# Patient Record
Sex: MALE | NOT HISPANIC OR LATINO | Employment: OTHER | URBAN - METROPOLITAN AREA
[De-identification: names, ages, dates, MRNs, and addresses within clinical notes are randomized per-mention and may not be internally consistent; named-entity substitution may affect disease eponyms.]

---

## 2021-04-03 ENCOUNTER — IMMUNIZATION (OUTPATIENT)
Dept: VACCINE CLINIC | Facility: HOSPITAL | Age: 66
End: 2021-04-03

## 2021-04-03 PROCEDURE — 91300 HC SARSCOV02 VAC 30MCG/0.3ML IM: CPT | Performed by: INTERNAL MEDICINE

## 2021-04-03 PROCEDURE — 0001A: CPT | Performed by: INTERNAL MEDICINE

## 2021-04-27 ENCOUNTER — APPOINTMENT (OUTPATIENT)
Dept: VACCINE CLINIC | Facility: HOSPITAL | Age: 66
End: 2021-04-27

## 2021-04-27 ENCOUNTER — IMMUNIZATION (OUTPATIENT)
Dept: VACCINE CLINIC | Facility: HOSPITAL | Age: 66
End: 2021-04-27

## 2021-04-27 PROCEDURE — 0002A: CPT | Performed by: INTERNAL MEDICINE

## 2021-04-27 PROCEDURE — 91300 HC SARSCOV02 VAC 30MCG/0.3ML IM: CPT | Performed by: INTERNAL MEDICINE

## 2021-05-06 ENCOUNTER — NURSE TRIAGE (OUTPATIENT)
Dept: CALL CENTER | Facility: HOSPITAL | Age: 66
End: 2021-05-06

## 2021-05-06 NOTE — TELEPHONE ENCOUNTER
Second vaccine 10 days ago Pfizer vaccine on 04/27/2021. Has not felt well since getting vaccine.  Intermittent low grade fever since vaccine. Temperature today 99.4 takes Tylenol. Very fatigued since vaccine. Injection site with redness, pain.  Has a call out to MD for a zoom call. He does not want to go to office or to the Er, does not wany to be exposed to anything. Advice per guideline.  Reason for Disposition  • Immunization needed, questions about    Additional Information  • Negative: [1] Difficulty with breathing or swallowing AND [2] starts within 2 hours after injection  • Negative: Difficult to awaken or acting confused (e.g., disoriented, slurred speech)  • Negative: Unresponsive, passed out, or very weak  • Negative: Sounds like a life-threatening emergency to the triager  • Negative: Fever > 104 F (40 C)  • Negative: [1] Fever > 101 F (38.3 C) AND [2] age > 60  • Negative: [1] Fever > 100.0 F (37.8 C) AND [2] bedridden (e.g., nursing home patient, CVA, chronic illness, recovering from surgery)  • Negative: [1] Fever > 100.0 F (37.8 C) AND [2] diabetes mellitus or weak immune system (e.g., HIV positive, cancer chemo, splenectomy, organ transplant, chronic steroids)  • Negative: [1] Measles vaccine rash (onset day 6-12) AND [2] purple or blood-colored  • Negative: Sounds like a severe, unusual reaction to the triager  • Negative: [1] Redness or red streak around the injection site AND [2] begins > 48 hours after shot AND [3] fever  • Negative: [1] Redness or red streak around the injection site AND [2] begins > 48 hours after shot AND [3] no fever  (Exception: red area < 1 inch or 2.5 cm wide)  • Negative: Fever present > 3 days (72 hours)  • Negative: [1] Over 3 days (72 hours) since shot AND [2] redness, swelling or pain getting worse  • Negative: [1] Smallpox vaccine and [2] eye pain, eye redness, or rash on eyelids  • Negative: [1] Pain, tenderness, or swelling at the injection site AND [2] persists  "> 3 days  • Negative: [1] Measles vaccine rash (onset day 6-12) AND [2] persists > 3 days  • Negative: [1] Deep lump follows (in 2 to 8 weeks) Td or TDaP  shot AND [2] becomes tender to the touch    Answer Assessment - Initial Assessment Questions  1. SYMPTOMS: \"What is the main symptom?\" (e.g., redness, swelling, pain)    denies  2. ONSET: \"When was the vaccine (shot) given?\" \"How much later did the *No Answer* begin?\" (e.g., hours, days ago)   04/2/2021  3. SEVERITY: \"How bad is it?\"      It is an effort to move around very fatigued  4. FEVER: \"Is there a fever?\" If so, ask: \"What is it, how was it measured, and when did it start?\"   99.4  5. IMMUNIZATIONS GIVEN: \"What shots have you recently received?\"  2nd dose Pfizer  6. PAST REACTIONS: \"Have you reacted to immunizations before?\" If so, ask: \"What happened?\"    denies  7. OTHER SYMPTOMS: \"Do you have any other symptoms?\"  Fatigue no energy    Protocols used: IMMUNIZATION REACTIONS-ADULT-AH      "

## 2023-10-09 ENCOUNTER — APPOINTMENT (OUTPATIENT)
Dept: CT IMAGING | Facility: HOSPITAL | Age: 68
End: 2023-10-09
Payer: MEDICARE

## 2023-10-09 ENCOUNTER — HOSPITAL ENCOUNTER (EMERGENCY)
Facility: HOSPITAL | Age: 68
Discharge: HOME | End: 2023-10-10
Attending: EMERGENCY MEDICINE | Admitting: EMERGENCY MEDICINE
Payer: MEDICARE

## 2023-10-09 DIAGNOSIS — S09.90XA INJURY OF HEAD, INITIAL ENCOUNTER: ICD-10-CM

## 2023-10-09 DIAGNOSIS — B02.29 POSTHERPETIC NEURALGIA: Primary | ICD-10-CM

## 2023-10-09 PROCEDURE — 70450 CT HEAD/BRAIN W/O DYE: CPT

## 2023-10-09 PROCEDURE — 72125 CT NECK SPINE W/O DYE: CPT

## 2023-10-09 PROCEDURE — 99284 EMERGENCY DEPT VISIT MOD MDM: CPT

## 2023-10-09 RX ORDER — HYDROCODONE BITARTRATE AND ACETAMINOPHEN 5; 325 MG/1; MG/1
1 TABLET ORAL EVERY 6 HOURS PRN
Status: DISCONTINUED | OUTPATIENT
Start: 2023-10-09 | End: 2023-10-10 | Stop reason: HOSPADM

## 2023-10-09 RX ADMIN — HYDROCODONE BITARTRATE AND ACETAMINOPHEN 1 TABLET: 5; 325 TABLET ORAL at 23:25

## 2023-10-10 VITALS
OXYGEN SATURATION: 98 % | DIASTOLIC BLOOD PRESSURE: 90 MMHG | HEIGHT: 74 IN | BODY MASS INDEX: 22.46 KG/M2 | HEART RATE: 66 BPM | SYSTOLIC BLOOD PRESSURE: 158 MMHG | WEIGHT: 175 LBS | TEMPERATURE: 97 F | RESPIRATION RATE: 18 BRPM

## 2023-10-10 RX ORDER — IBUPROFEN 400 MG/1
400 TABLET ORAL ONCE
Status: COMPLETED | OUTPATIENT
Start: 2023-10-10 | End: 2023-10-10

## 2023-10-10 RX ORDER — HYDROCODONE BITARTRATE AND ACETAMINOPHEN 5; 325 MG/1; MG/1
1 TABLET ORAL EVERY 12 HOURS PRN
Qty: 8 TABLET | Refills: 0 | Status: SHIPPED | OUTPATIENT
Start: 2023-10-10

## 2023-10-10 RX ADMIN — IBUPROFEN 400 MG: 400 TABLET, FILM COATED ORAL at 01:28

## 2023-10-10 NOTE — DISCHARGE INSTRUCTIONS
As we discussed, follow-up with your primary care physician later this week.  Take Tylenol and Motrin for pain.  Use hydrocodone for breakthrough pain only.  Return if worsening of symptoms or any other concerns.

## 2023-10-10 NOTE — ED PROVIDER NOTES
EMERGENCY DEPARTMENT ENCOUNTER    Room Number:  19/19  Date of encounter:  10/10/2023  PCP: Provider, No Known  Historian: Patient  Relevant information and history provided by sources other than the patient will be included below and in the ED Course.  Review of pertinent past medical records may also be included in record below and ED Course.    HPI:  Chief Complaint: Persistent pain from shingles, fall and hit head  A complete HPI/ROS/PMH/PSH/SH/FH are unobtainable due to: Not applicable  Context: Beth Mckinney is a 68 y.o. male who presents to the ED c/o this patient had a fall last night.  Patient's lights were off in the den.  He went and walked to the next room to get some snack and something to eat.  He walked back into the den and he tripped over his piece of furniture and then landed on the floor hitting his left forehead..  He has had persistent pain to the left forehead.  But he has had pain since he had shingles in the first week of September.  He has been taking Tylenol and Motrin.  He has tried Lyrica without success.  He was on hydrocodone and has been off hydrocodone for about 10 days.  The pain has persisted.  He believes the pain is likely from the shingles rather than from the fall.  He denied loss of consciousness.  Denies any neurologic deficit.  Denies any visual change, weakness to arms or legs or sensory change.  He did not hurt himself anywhere else.  Denies any neck pain.        Previous Episodes: Persistent postherpetic neuralgia from shingles to his left forehead  Current Symptoms: See above    MEDICAL HISTORY REVIEWED  In looking at old records I see this patient was recently seen up [facility] 2023 had postherpetic neuralgia.  Patient has a history of diabetes and arthritis.  I reviewed the patient's medicine list.      PAST MEDICAL HISTORY  Active Ambulatory Problems     Diagnosis Date Noted    No Active Ambulatory Problems     Resolved Ambulatory Problems     Diagnosis  Date Noted    No Resolved Ambulatory Problems     Past Medical History:   Diagnosis Date    Arthritis     Colon cancer     Diabetes mellitus          PAST SURGICAL HISTORY  Past Surgical History:   Procedure Laterality Date    FEMUR SURGERY      JOINT REPLACEMENT           FAMILY HISTORY  Family History   Problem Relation Age of Onset    No Known Problems Mother     Diabetes Father          SOCIAL HISTORY  Social History     Socioeconomic History    Marital status: Single   Tobacco Use    Smoking status: Never   Substance and Sexual Activity    Alcohol use: Yes     Comment: SELDOM         ALLERGIES  Tree nut and Penicillins        REVIEW OF SYSTEMS  Review of Systems     All systems reviewed and negative except for those discussed in HPI.       PHYSICAL EXAM    I have reviewed the triage vital signs and nursing notes.    ED Triage Vitals   Temp Heart Rate Resp BP SpO2   10/09/23 2302 10/09/23 2302 10/09/23 2302 10/09/23 2309 10/09/23 2302   97 øF (36.1 øC) 97 18 (!) 163/109 96 %      Temp src Heart Rate Source Patient Position BP Location FiO2 (%)   -- -- 10/09/23 2309 -- --     Lying         GENERAL: Elderly male.  No acute cardiovascular or respiratory distress.Vital signs on my initial evaluation have been reviewed and unremarkable  HENT: nares patent  Head he has a small abrasion to his left forehead.  He has significant scarring that you can see where he had shingles located to his left forehead left periorbital region and to his left nasal septum.  There is no acute vascular lesions.  There is no drainage.  He has no active bleeding../neck/ face are symmetric without gross deformity, signs of trauma, or swelling  EYES: no scleral icterus, no conjunctival pallor.  Conjunctive a looks unremarkable.  No visual impairment.  Pupils equal round reactive to light extract muscles are intact.  NECK: Supple, no meningismus.  Full range of motion normal inspection and nontender with palpation  CV: regular rhythm, regular  rate with intact distal pulses.  RESPIRATORY: normal effort and no respiratory distress.  ABDOMEN: soft and nontender  MUSCULOSKELETAL: no deformity.  Full range of motion to active and passive range of motion to all extremities.  No pain with movement.  Intact distal pulses  NEURO: alert and appropriate, moves all extremities, follows commands.  No focal motor or sensory changes  SKIN: warm, dry    Vital signs and nursing notes reviewed.        LAB RESULTS  No results found for this or any previous visit (from the past 24 hour(s)).    Ordered the above labs and independently reviewed the results.        RADIOLOGY  CT Cervical Spine Without Contrast    Result Date: 10/10/2023  Patient: FANNIE KIMBALL  Time Out: 00:23 Exam(s): CT C SPINE EXAM:   CT Cervical Spine Without Intravenous Contrast CLINICAL HISTORY:   Fall and injury to head. TECHNIQUE:   Axial computed tomography images of the cervical spine without intravenous contrast.  CTDI is 16.85 mGy and DLP is 427.9 mGy-cm.  This CT exam was performed according to the principle of ALARA (As Low As Reasonably Achievable) by using one or more of the following dose reduction techniques: automated exposure control, adjustment of the mA and or kV according to patient size, and or use of iterative reconstruction technique. COMPARISON:   No relevant prior studies available. FINDINGS:   Vertebrae:  The vertebral bodies are intact without acute osseous traumatic injury.  No anterolisthesis or retrolisthesis is identified.  The facet joints are well aligned without subluxation or dislocation.  The pedicles, transverse processes and spinous processes are intact.   Discs spinal canal neural foramina:  No acute findings.  Chronic disc space narrowing and marginal hypertrophic changes noted at several levels.   No osseous spinal canal stenosis.   Soft tissues:  Unremarkable.   Vasculature:  Incidental variant right subclavian artery noted.   Lung apices:  The lung apices  demonstrate no evidence for significant acute traumatic injury. IMPRESSION:       No acute osseous traumatic injury or significant abnormal alignment involving the cervical spine.     Electronically signed by Darryl Colvin MD on 10-10-23 at 0023    CT Head Without Contrast    Result Date: 10/10/2023  Patient: FANNIE KIMBALL  Time Out: 00:19 Exam(s): CT HEAD Without Contrast EXAM:   CT Head Without Intravenous Contrast CLINICAL HISTORY:   Fall and hit left forehead.. TECHNIQUE:   Axial computed tomography images of the head brain without intravenous contrast.  This CT exam was performed according to the principle of ALARA (As Low As Reasonably Achievable) by using one or more of the following dose reduction techniques: automated exposure control, adjustment of the mA and or kV according to patient size, and or use of iterative reconstruction technique. COMPARISON:   No relevant prior studies available. FINDINGS:   Brain:  No intracranial hemorrhage.  No significant mass effect.  No evidence for cortical infarct.  Mild periventricular deep white matter hypodense changes noted.   Ventricles:  Unremarkable.  No ventriculomegaly.   Bones joints:  Unremarkable.  No acute fracture.   Soft tissues:  Mild soft tissue fullness in the inferior left supraorbital region.  No other significant soft tissue abnormality identified.   Sinuses:  Mucosal thickening involving the posterior right maxillary sinusitis.  The paranasal sinuses are otherwise well-aerated.   Mastoid air cells:  Unremarkable as visualized.  No mastoid effusion. IMPRESSION:       No acute intracranial process identified.  Mild soft tissue fullness overlying the inferior left frontal region.  No skull fracture.     Electronically signed by Darryl Colvin MD on 10-10-23 at 0019     I ordered the above noted radiological studies. Reviewed by me and discussed with radiologist.  See dictation for official radiology  interpretation.      PROCEDURES    Procedures      MEDICATIONS GIVEN IN ER    Medications   HYDROcodone-acetaminophen (NORCO) 5-325 MG per tablet 1 tablet (1 tablet Oral Given 10/9/23 5685)   ibuprofen (ADVIL,MOTRIN) tablet 400 mg (400 mg Oral Given 10/10/23 0128)         All labs have been independently reviewed by me.  All radiology studies have been reviewed by me and I discussed with radiologist dictating the report when indicated below.  All EKG's independently viewed and interpreted by me.  Discussion below represents my analysis of pertinent findings related to patient's condition, differential diagnosis, treatment plan and final disposition.        PROGRESS, DATA ANALYSIS, CONSULTS, AND MEDICAL DECISION MAKING    We will check a CT scan of his head and neck.  I anticipate this pain is likely from the postherpetic neuralgia rather than from head injury.  He is not on any blood thinning medicine.  He is requesting medicine for pain.  He has been taking Motrin currently without success.  He states that hydrocodone low-dose was very effective at help alleviating the pain.  He has not been on hydrocodone for 10 days.  He tried Lyrica and he had side effects with nausea and vomiting and did not find it was effective.  I will give him a low-dose of hydrocodone here.  Discussed with him at length about the side effects and addictive potential and he understands.  He still would like hydrocodone.      ED Course as of 10/10/23 0151   Tue Oct 10, 2023   0111 I reviewed the CT scan of the head report from radiologist.  No acute intracranial process identified.  Please see complete dictated report from radiologist. [MM]   0111 I reviewed the CT scan report of the cervical spine from radiologist.  No acute fracture or signs of traumatic injury no dislocation.  Please see complete dictated report from radiologist [MM]   0115 On reevaluation of the patient he is resting comfortably.  He has had improvement of his pain with  the hydrocodone.  He is requesting hydrocodone to go home with.  I will give him 5\325.  Instructed him to use it for breakthrough pain only and only take it as prescribed.  He has tolerated hydrocodone in the past.  All questions answered [MM]      ED Course User Index  [MM] Nirav Jeffrey MD       AS OF 01:51 EDT VITALS:    BP - 158/90  HR - 66  TEMP - 97 øF (36.1 øC)  02 SATS - 98%    SOCIAL DETERMINANTS OF HEALTH THAT IMPACT OR LIMIT CARE (For example..Homelessness,safe discharge, inability to obtain care, follow up, or prescriptions):      DIAGNOSIS  Final diagnoses:   Postherpetic neuralgia   Injury of head, initial encounter         DISPOSITION  DISCHARGE    Patient discharged in stable condition.    Reviewed implications of results, diagnosis, meds, responsibility to follow up, warning signs and symptoms of possible worsening, potential complications and reasons to return to ER, including worsening of symptoms, any worsening of symptoms, fever, any new weakness to arms or legs, worsening of pain, any concerns..    Patient/Family voiced understanding of above instructions.    Discussed plan for discharge, as there is no emergent indication for admission. Pt/family is agreeable and understands need for follow up and repeat testing.  Pt is aware that discharge does not mean that nothing is wrong but it indicates no emergency is present that requires admission and they must continue care with follow-up as given below or physician of their choice.     FOLLOW-UP  No follow-up provider specified.       Medication List        New Prescriptions      HYDROcodone-acetaminophen 5-325 MG per tablet  Commonly known as: NORCO  Take 1 tablet by mouth Every 12 (Twelve) Hours As Needed for Moderate Pain for up to 8 doses.               Where to Get Your Medications        You can get these medications from any pharmacy    Bring a paper prescription for each of these medications  HYDROcodone-acetaminophen 5-325 MG per  tablet                 DICTATED UTILIZING DRAGON DICTATION    Note Disclaimer: At [facility], we believe that sharing information builds trust and better relationships. You are receiving this note because you recently visited [facility]. It is possible you will see health information before a provider has talked with you about it. This kind of information can be easy to misunderstand. To help you fully understand what it means for your health, we urge you to discuss this note with your provider.       Nirav Jeffrey MD  10/10/23 0151

## 2023-10-19 ENCOUNTER — APPOINTMENT (OUTPATIENT)
Dept: GENERAL RADIOLOGY | Facility: HOSPITAL | Age: 68
DRG: 369 | End: 2023-10-19
Payer: MEDICARE

## 2023-10-19 ENCOUNTER — APPOINTMENT (OUTPATIENT)
Dept: CT IMAGING | Facility: HOSPITAL | Age: 68
DRG: 369 | End: 2023-10-19
Payer: MEDICARE

## 2023-10-19 ENCOUNTER — HOSPITAL ENCOUNTER (INPATIENT)
Facility: HOSPITAL | Age: 68
LOS: 3 days | Discharge: HOME | DRG: 369 | End: 2023-10-22
Attending: EMERGENCY MEDICINE | Admitting: INTERNAL MEDICINE
Payer: MEDICARE

## 2023-10-19 DIAGNOSIS — R01.1 SYSTOLIC MURMUR: ICD-10-CM

## 2023-10-19 DIAGNOSIS — I95.89 HYPOTENSION DUE TO HYPOVOLEMIA: ICD-10-CM

## 2023-10-19 DIAGNOSIS — B20 HIV INFECTION, UNSPECIFIED SYMPTOM STATUS: ICD-10-CM

## 2023-10-19 DIAGNOSIS — K92.2 ACUTE UPPER GI BLEED: Primary | ICD-10-CM

## 2023-10-19 DIAGNOSIS — E86.1 HYPOTENSION DUE TO HYPOVOLEMIA: ICD-10-CM

## 2023-10-19 DIAGNOSIS — N39.0 ACUTE UTI: ICD-10-CM

## 2023-10-19 LAB
ABO GROUP BLD: NORMAL
ALBUMIN SERPL-MCNC: 2.6 G/DL (ref 3.5–5.2)
ALBUMIN/GLOB SERPL: 1 G/DL
ALP SERPL-CCNC: 31 U/L (ref 39–117)
ALT SERPL W P-5'-P-CCNC: 12 U/L (ref 1–41)
ANION GAP SERPL CALCULATED.3IONS-SCNC: 6 MMOL/L (ref 5–15)
AST SERPL-CCNC: 13 U/L (ref 1–40)
BACTERIA UR QL AUTO: ABNORMAL /HPF
BASOPHILS # BLD AUTO: 0.05 10*3/MM3 (ref 0–0.2)
BASOPHILS NFR BLD AUTO: 0.4 % (ref 0–1.5)
BILIRUB SERPL-MCNC: 0.2 MG/DL (ref 0–1.2)
BILIRUB UR QL STRIP: NEGATIVE
BLD GP AB SCN SERPL QL: NEGATIVE
BUN SERPL-MCNC: 49 MG/DL (ref 8–23)
BUN/CREAT SERPL: 50.5 (ref 7–25)
CALCIUM SPEC-SCNC: 8.9 MG/DL (ref 8.6–10.5)
CHLORIDE SERPL-SCNC: 111 MMOL/L (ref 98–107)
CLARITY UR: CLEAR
CO2 SERPL-SCNC: 25 MMOL/L (ref 22–29)
COLOR UR: YELLOW
CREAT SERPL-MCNC: 0.97 MG/DL (ref 0.76–1.27)
D DIMER PPP FEU-MCNC: 0.48 MCGFEU/ML (ref 0–0.66)
D-LACTATE SERPL-SCNC: 2.4 MMOL/L (ref 0.5–2)
DEPRECATED RDW RBC AUTO: 48 FL (ref 37–54)
EGFRCR SERPLBLD CKD-EPI 2021: 86.1 ML/MIN/1.73
EOSINOPHIL # BLD AUTO: 0.15 10*3/MM3 (ref 0–0.4)
EOSINOPHIL NFR BLD AUTO: 1.3 % (ref 0.3–6.2)
ERYTHROCYTE [DISTWIDTH] IN BLOOD BY AUTOMATED COUNT: 15.4 % (ref 12.3–15.4)
FERRITIN SERPL-MCNC: 47.5 NG/ML (ref 30–400)
FOLATE SERPL-MCNC: 4.3 NG/ML (ref 4.78–24.2)
GLOBULIN UR ELPH-MCNC: 2.7 GM/DL
GLUCOSE SERPL-MCNC: 132 MG/DL (ref 65–99)
GLUCOSE UR STRIP-MCNC: NEGATIVE MG/DL
HAPTOGLOB SERPL-MCNC: 105 MG/DL (ref 30–200)
HCT VFR BLD AUTO: 18.3 % (ref 37.5–51)
HGB BLD-MCNC: 5.6 G/DL (ref 13–17.7)
HGB UR QL STRIP.AUTO: NEGATIVE
HYALINE CASTS UR QL AUTO: ABNORMAL /LPF
IMM GRANULOCYTES # BLD AUTO: 0.1 10*3/MM3 (ref 0–0.05)
IMM GRANULOCYTES NFR BLD AUTO: 0.9 % (ref 0–0.5)
IRON 24H UR-MRATE: 44 MCG/DL (ref 59–158)
IRON SATN MFR SERPL: 17 % (ref 20–50)
KETONES UR QL STRIP: NEGATIVE
LDH SERPL-CCNC: 125 U/L (ref 135–225)
LEUKOCYTE ESTERASE UR QL STRIP.AUTO: ABNORMAL
LYMPHOCYTES # BLD AUTO: 2.8 10*3/MM3 (ref 0.7–3.1)
LYMPHOCYTES NFR BLD AUTO: 23.9 % (ref 19.6–45.3)
MCH RBC QN AUTO: 27.2 PG (ref 26.6–33)
MCHC RBC AUTO-ENTMCNC: 30.6 G/DL (ref 31.5–35.7)
MCV RBC AUTO: 88.8 FL (ref 79–97)
MONOCYTES # BLD AUTO: 0.89 10*3/MM3 (ref 0.1–0.9)
MONOCYTES NFR BLD AUTO: 7.6 % (ref 5–12)
NEUTROPHILS NFR BLD AUTO: 65.9 % (ref 42.7–76)
NEUTROPHILS NFR BLD AUTO: 7.74 10*3/MM3 (ref 1.7–7)
NITRITE UR QL STRIP: NEGATIVE
NRBC BLD AUTO-RTO: 0.3 /100 WBC (ref 0–0.2)
NT-PROBNP SERPL-MCNC: <36 PG/ML (ref 0–900)
PH UR STRIP.AUTO: 5.5 [PH] (ref 5–8)
PLATELET # BLD AUTO: 196 10*3/MM3 (ref 140–450)
PMV BLD AUTO: 11 FL (ref 6–12)
POTASSIUM SERPL-SCNC: 3.9 MMOL/L (ref 3.5–5.2)
PROT SERPL-MCNC: 5.3 G/DL (ref 6–8.5)
PROT UR QL STRIP: NEGATIVE
RBC # BLD AUTO: 2.06 10*6/MM3 (ref 4.14–5.8)
RBC # UR STRIP: ABNORMAL /HPF
REF LAB TEST METHOD: ABNORMAL
RETICS # AUTO: 0.06 10*6/MM3 (ref 0.02–0.13)
RETICS/RBC NFR AUTO: 3.19 % (ref 0.7–1.9)
RH BLD: POSITIVE
SODIUM SERPL-SCNC: 142 MMOL/L (ref 136–145)
SP GR UR STRIP: 1.02 (ref 1–1.03)
SQUAMOUS #/AREA URNS HPF: ABNORMAL /HPF
T&S EXPIRATION DATE: NORMAL
TIBC SERPL-MCNC: 262 MCG/DL (ref 298–536)
TRANSFERRIN SERPL-MCNC: 176 MG/DL (ref 200–360)
TROPONIN T SERPL HS-MCNC: 28 NG/L
UROBILINOGEN UR QL STRIP: ABNORMAL
VIT B12 BLD-MCNC: 631 PG/ML (ref 211–946)
WBC # UR STRIP: ABNORMAL /HPF
WBC NRBC COR # BLD: 11.73 10*3/MM3 (ref 3.4–10.8)

## 2023-10-19 PROCEDURE — 83010 ASSAY OF HAPTOGLOBIN QUANT: CPT | Performed by: EMERGENCY MEDICINE

## 2023-10-19 PROCEDURE — 86850 RBC ANTIBODY SCREEN: CPT | Performed by: EMERGENCY MEDICINE

## 2023-10-19 PROCEDURE — 82728 ASSAY OF FERRITIN: CPT | Performed by: EMERGENCY MEDICINE

## 2023-10-19 PROCEDURE — 85045 AUTOMATED RETICULOCYTE COUNT: CPT | Performed by: EMERGENCY MEDICINE

## 2023-10-19 PROCEDURE — 85025 COMPLETE CBC W/AUTO DIFF WBC: CPT | Performed by: EMERGENCY MEDICINE

## 2023-10-19 PROCEDURE — 84484 ASSAY OF TROPONIN QUANT: CPT | Performed by: EMERGENCY MEDICINE

## 2023-10-19 PROCEDURE — 71045 X-RAY EXAM CHEST 1 VIEW: CPT

## 2023-10-19 PROCEDURE — 86900 BLOOD TYPING SEROLOGIC ABO: CPT

## 2023-10-19 PROCEDURE — 82607 VITAMIN B-12: CPT | Performed by: EMERGENCY MEDICINE

## 2023-10-19 PROCEDURE — 84466 ASSAY OF TRANSFERRIN: CPT | Performed by: EMERGENCY MEDICINE

## 2023-10-19 PROCEDURE — 83540 ASSAY OF IRON: CPT | Performed by: EMERGENCY MEDICINE

## 2023-10-19 PROCEDURE — 86923 COMPATIBILITY TEST ELECTRIC: CPT

## 2023-10-19 PROCEDURE — 87040 BLOOD CULTURE FOR BACTERIA: CPT | Performed by: EMERGENCY MEDICINE

## 2023-10-19 PROCEDURE — 82948 REAGENT STRIP/BLOOD GLUCOSE: CPT

## 2023-10-19 PROCEDURE — 83880 ASSAY OF NATRIURETIC PEPTIDE: CPT | Performed by: EMERGENCY MEDICINE

## 2023-10-19 PROCEDURE — 25810000003 SODIUM CHLORIDE 0.9 % SOLUTION: Performed by: EMERGENCY MEDICINE

## 2023-10-19 PROCEDURE — 87086 URINE CULTURE/COLONY COUNT: CPT | Performed by: EMERGENCY MEDICINE

## 2023-10-19 PROCEDURE — 86900 BLOOD TYPING SEROLOGIC ABO: CPT | Performed by: EMERGENCY MEDICINE

## 2023-10-19 PROCEDURE — 82746 ASSAY OF FOLIC ACID SERUM: CPT | Performed by: EMERGENCY MEDICINE

## 2023-10-19 PROCEDURE — 86901 BLOOD TYPING SEROLOGIC RH(D): CPT | Performed by: EMERGENCY MEDICINE

## 2023-10-19 PROCEDURE — 80053 COMPREHEN METABOLIC PANEL: CPT | Performed by: EMERGENCY MEDICINE

## 2023-10-19 PROCEDURE — 25510000001 IOPAMIDOL 61 % SOLUTION: Performed by: INTERNAL MEDICINE

## 2023-10-19 PROCEDURE — 83615 LACTATE (LD) (LDH) ENZYME: CPT | Performed by: EMERGENCY MEDICINE

## 2023-10-19 PROCEDURE — 85379 FIBRIN DEGRADATION QUANT: CPT | Performed by: EMERGENCY MEDICINE

## 2023-10-19 PROCEDURE — 93010 ELECTROCARDIOGRAM REPORT: CPT | Performed by: INTERNAL MEDICINE

## 2023-10-19 PROCEDURE — 36415 COLL VENOUS BLD VENIPUNCTURE: CPT

## 2023-10-19 PROCEDURE — 83605 ASSAY OF LACTIC ACID: CPT | Performed by: EMERGENCY MEDICINE

## 2023-10-19 PROCEDURE — 25010000002 KETOROLAC TROMETHAMINE PER 15 MG: Performed by: EMERGENCY MEDICINE

## 2023-10-19 PROCEDURE — 74177 CT ABD & PELVIS W/CONTRAST: CPT

## 2023-10-19 PROCEDURE — 93005 ELECTROCARDIOGRAM TRACING: CPT | Performed by: EMERGENCY MEDICINE

## 2023-10-19 PROCEDURE — 25010000002 CEFTRIAXONE PER 250 MG: Performed by: EMERGENCY MEDICINE

## 2023-10-19 PROCEDURE — 86880 COOMBS TEST DIRECT: CPT | Performed by: EMERGENCY MEDICINE

## 2023-10-19 PROCEDURE — 81001 URINALYSIS AUTO W/SCOPE: CPT | Performed by: EMERGENCY MEDICINE

## 2023-10-19 PROCEDURE — 99285 EMERGENCY DEPT VISIT HI MDM: CPT

## 2023-10-19 PROCEDURE — 86901 BLOOD TYPING SEROLOGIC RH(D): CPT

## 2023-10-19 RX ORDER — PANTOPRAZOLE SODIUM 40 MG/10ML
80 INJECTION, POWDER, LYOPHILIZED, FOR SOLUTION INTRAVENOUS ONCE
Status: COMPLETED | OUTPATIENT
Start: 2023-10-19 | End: 2023-10-19

## 2023-10-19 RX ORDER — KETOROLAC TROMETHAMINE 15 MG/ML
15 INJECTION, SOLUTION INTRAMUSCULAR; INTRAVENOUS ONCE
Status: COMPLETED | OUTPATIENT
Start: 2023-10-19 | End: 2023-10-19

## 2023-10-19 RX ORDER — SODIUM CHLORIDE 0.9 % (FLUSH) 0.9 %
10 SYRINGE (ML) INJECTION AS NEEDED
Status: DISCONTINUED | OUTPATIENT
Start: 2023-10-19 | End: 2023-10-22 | Stop reason: HOSPADM

## 2023-10-19 RX ADMIN — IOPAMIDOL 85 ML: 612 INJECTION, SOLUTION INTRAVENOUS at 23:11

## 2023-10-19 RX ADMIN — SODIUM CHLORIDE 1000 ML: 9 INJECTION, SOLUTION INTRAVENOUS at 22:06

## 2023-10-19 RX ADMIN — KETOROLAC TROMETHAMINE 15 MG: 15 INJECTION, SOLUTION INTRAMUSCULAR; INTRAVENOUS at 22:06

## 2023-10-19 RX ADMIN — CEFTRIAXONE 2000 MG: 2 INJECTION, POWDER, FOR SOLUTION INTRAMUSCULAR; INTRAVENOUS at 22:06

## 2023-10-19 RX ADMIN — PANTOPRAZOLE SODIUM 8 MG/HR: 40 INJECTION, POWDER, FOR SOLUTION INTRAVENOUS at 23:25

## 2023-10-19 RX ADMIN — PANTOPRAZOLE SODIUM 80 MG: 40 INJECTION, POWDER, FOR SOLUTION INTRAVENOUS at 22:31

## 2023-10-20 ENCOUNTER — APPOINTMENT (OUTPATIENT)
Dept: CARDIOLOGY | Facility: HOSPITAL | Age: 68
DRG: 369 | End: 2023-10-20
Payer: MEDICARE

## 2023-10-20 ENCOUNTER — ANESTHESIA (OUTPATIENT)
Dept: GASTROENTEROLOGY | Facility: HOSPITAL | Age: 68
DRG: 369 | End: 2023-10-20
Payer: MEDICARE

## 2023-10-20 ENCOUNTER — ANESTHESIA EVENT (OUTPATIENT)
Dept: GASTROENTEROLOGY | Facility: HOSPITAL | Age: 68
DRG: 369 | End: 2023-10-20
Payer: MEDICARE

## 2023-10-20 VITALS — SYSTOLIC BLOOD PRESSURE: 114 MMHG | DIASTOLIC BLOOD PRESSURE: 49 MMHG | OXYGEN SATURATION: 100 % | HEART RATE: 95 BPM

## 2023-10-20 LAB
AORTIC DIMENSIONLESS INDEX: 1.1 (DI)
ASCENDING AORTA: 2.7 CM
BASOPHILS # BLD AUTO: 0.06 10*3/MM3 (ref 0–0.2)
BASOPHILS NFR BLD AUTO: 0.5 % (ref 0–1.5)
BH BB BLOOD EXPIRATION DATE: NORMAL
BH BB BLOOD EXPIRATION DATE: NORMAL
BH BB BLOOD TYPE BARCODE: 6200
BH BB BLOOD TYPE BARCODE: 6200
BH BB DISPENSE STATUS: NORMAL
BH BB DISPENSE STATUS: NORMAL
BH BB PRODUCT CODE: NORMAL
BH BB PRODUCT CODE: NORMAL
BH BB UNIT NUMBER: NORMAL
BH BB UNIT NUMBER: NORMAL
BH CV ECHO LEFT VENTRICLE BASAL CAVITARY GRADIENT: 35 MMHG
BH CV ECHO MEAS - ACS: 1.57 CM
BH CV ECHO MEAS - AO MAX PG: 18 MMHG
BH CV ECHO MEAS - AO MEAN PG: 8.8 MMHG
BH CV ECHO MEAS - AO ROOT DIAM: 3.7 CM
BH CV ECHO MEAS - AO V2 MAX: 211 CM/SEC
BH CV ECHO MEAS - AO V2 VTI: 33.2 CM
BH CV ECHO MEAS - AVA(I,D): 3.9 CM2
BH CV ECHO MEAS - EDV(CUBED): 31.2 ML
BH CV ECHO MEAS - EDV(MOD-SP2): 88 ML
BH CV ECHO MEAS - EDV(MOD-SP4): 97 ML
BH CV ECHO MEAS - EF(MOD-BP): 62.6 %
BH CV ECHO MEAS - EF(MOD-SP2): 63.6 %
BH CV ECHO MEAS - EF(MOD-SP4): 62.9 %
BH CV ECHO MEAS - ESV(CUBED): 11 ML
BH CV ECHO MEAS - ESV(MOD-SP2): 32 ML
BH CV ECHO MEAS - ESV(MOD-SP4): 36 ML
BH CV ECHO MEAS - FS: 29.4 %
BH CV ECHO MEAS - IVS/LVPW: 1.09 CM
BH CV ECHO MEAS - IVSD: 1.51 CM
BH CV ECHO MEAS - LAT PEAK E' VEL: 15.2 CM/SEC
BH CV ECHO MEAS - LV MASS(C)D: 159.1 GRAMS
BH CV ECHO MEAS - LV MAX PG: 14.5 MMHG
BH CV ECHO MEAS - LV MEAN PG: 7.6 MMHG
BH CV ECHO MEAS - LV V1 MAX: 190.4 CM/SEC
BH CV ECHO MEAS - LV V1 VTI: 34.8 CM
BH CV ECHO MEAS - LVIDD: 3.1 CM
BH CV ECHO MEAS - LVIDS: 2.22 CM
BH CV ECHO MEAS - LVOT AREA: 3.7 CM2
BH CV ECHO MEAS - LVOT DIAM: 2.18 CM
BH CV ECHO MEAS - LVPWD: 1.39 CM
BH CV ECHO MEAS - MED PEAK E' VEL: 12.6 CM/SEC
BH CV ECHO MEAS - MR MAX PG: 23.6 MMHG
BH CV ECHO MEAS - MR MAX VEL: 243 CM/SEC
BH CV ECHO MEAS - MV A DUR: 0.14 SEC
BH CV ECHO MEAS - MV A MAX VEL: 99.5 CM/SEC
BH CV ECHO MEAS - MV DEC SLOPE: 506.2 CM/SEC2
BH CV ECHO MEAS - MV DEC TIME: 0.13 SEC
BH CV ECHO MEAS - MV E MAX VEL: 62.9 CM/SEC
BH CV ECHO MEAS - MV E/A: 0.63
BH CV ECHO MEAS - MV MAX PG: 6.8 MMHG
BH CV ECHO MEAS - MV MEAN PG: 2.5 MMHG
BH CV ECHO MEAS - MV P1/2T: 50.3 MSEC
BH CV ECHO MEAS - MV V2 VTI: 20.6 CM
BH CV ECHO MEAS - MVA(P1/2T): 4.4 CM2
BH CV ECHO MEAS - MVA(VTI): 6.3 CM2
BH CV ECHO MEAS - PA ACC TIME: 0.09 SEC
BH CV ECHO MEAS - PA V2 MAX: 129 CM/SEC
BH CV ECHO MEAS - PULM DIAS VEL: 31.4 CM/SEC
BH CV ECHO MEAS - PULM S/D: 1.35
BH CV ECHO MEAS - PULM SYS VEL: 42.4 CM/SEC
BH CV ECHO MEAS - QP/QS: 0.29
BH CV ECHO MEAS - RAP SYSTOLE: 3 MMHG
BH CV ECHO MEAS - RV MAX PG: 5.8 MMHG
BH CV ECHO MEAS - RV V1 MAX: 120.3 CM/SEC
BH CV ECHO MEAS - RV V1 VTI: 22.9 CM
BH CV ECHO MEAS - RVOT DIAM: 1.45 CM
BH CV ECHO MEAS - RVSP: 25 MMHG
BH CV ECHO MEAS - SV(LVOT): 129.4 ML
BH CV ECHO MEAS - SV(MOD-SP2): 56 ML
BH CV ECHO MEAS - SV(MOD-SP4): 61 ML
BH CV ECHO MEAS - SV(RVOT): 38 ML
BH CV ECHO MEAS - TR MAX PG: 21.8 MMHG
BH CV ECHO MEAS - TR MAX VEL: 233.5 CM/SEC
BH CV ECHO MEASUREMENTS AVERAGE E/E' RATIO: 4.53
BH CV XLRA - RV BASE: 2.6 CM
BH CV XLRA - RV LENGTH: 7.3 CM
BH CV XLRA - RV MID: 2.8 CM
BH CV XLRA - TDI S': 23.4 CM/SEC
CROSSMATCH INTERPRETATION: NORMAL
CROSSMATCH INTERPRETATION: NORMAL
D-LACTATE SERPL-SCNC: 1.1 MMOL/L (ref 0.5–2)
D-LACTATE SERPL-SCNC: 2.6 MMOL/L (ref 0.5–2)
DAT POLY-SP REAG RBC QL: NEGATIVE
DEPRECATED RDW RBC AUTO: 46.9 FL (ref 37–54)
EOSINOPHIL # BLD AUTO: 0.08 10*3/MM3 (ref 0–0.4)
EOSINOPHIL NFR BLD AUTO: 0.7 % (ref 0.3–6.2)
ERYTHROCYTE [DISTWIDTH] IN BLOOD BY AUTOMATED COUNT: 14.7 % (ref 12.3–15.4)
GEN 5 2HR TROPONIN T REFLEX: 25 NG/L
GLUCOSE BLDC GLUCOMTR-MCNC: 118 MG/DL (ref 70–130)
GLUCOSE BLDC GLUCOMTR-MCNC: 143 MG/DL (ref 70–130)
GLUCOSE BLDC GLUCOMTR-MCNC: 184 MG/DL (ref 70–130)
GLUCOSE BLDC GLUCOMTR-MCNC: 98 MG/DL (ref 70–130)
HCT VFR BLD AUTO: 21.3 % (ref 37.5–51)
HGB BLD-MCNC: 7 G/DL (ref 13–17.7)
IMM GRANULOCYTES # BLD AUTO: 0.1 10*3/MM3 (ref 0–0.05)
IMM GRANULOCYTES NFR BLD AUTO: 0.9 % (ref 0–0.5)
LEFT ATRIUM VOLUME INDEX: 30.4 ML/M2
LYMPHOCYTES # BLD AUTO: 1.45 10*3/MM3 (ref 0.7–3.1)
LYMPHOCYTES NFR BLD AUTO: 12.6 % (ref 19.6–45.3)
MAGNESIUM SERPL-MCNC: 1.7 MG/DL (ref 1.6–2.4)
MCH RBC QN AUTO: 29.4 PG (ref 26.6–33)
MCHC RBC AUTO-ENTMCNC: 32.9 G/DL (ref 31.5–35.7)
MCV RBC AUTO: 89.5 FL (ref 79–97)
MONOCYTES # BLD AUTO: 0.43 10*3/MM3 (ref 0.1–0.9)
MONOCYTES NFR BLD AUTO: 3.7 % (ref 5–12)
NEUTROPHILS NFR BLD AUTO: 81.6 % (ref 42.7–76)
NEUTROPHILS NFR BLD AUTO: 9.37 10*3/MM3 (ref 1.7–7)
NRBC BLD AUTO-RTO: 0.3 /100 WBC (ref 0–0.2)
PLATELET # BLD AUTO: 157 10*3/MM3 (ref 140–450)
PMV BLD AUTO: 11.3 FL (ref 6–12)
QT INTERVAL: 355 MS
QTC INTERVAL: 454 MS
RBC # BLD AUTO: 2.38 10*6/MM3 (ref 4.14–5.8)
SINUS: 3.3 CM
STJ: 2.12 CM
TROPONIN T DELTA: -3 NG/L
UNIT  ABO: NORMAL
UNIT  ABO: NORMAL
UNIT  RH: NORMAL
UNIT  RH: NORMAL
WBC NRBC COR # BLD: 11.49 10*3/MM3 (ref 3.4–10.8)

## 2023-10-20 PROCEDURE — 99222 1ST HOSP IP/OBS MODERATE 55: CPT | Performed by: INTERNAL MEDICINE

## 2023-10-20 PROCEDURE — 82948 REAGENT STRIP/BLOOD GLUCOSE: CPT

## 2023-10-20 PROCEDURE — 25010000002 MORPHINE PER 10 MG: Performed by: INTERNAL MEDICINE

## 2023-10-20 PROCEDURE — 25010000002 METHYLPREDNISOLONE PER 40 MG: Performed by: INTERNAL MEDICINE

## 2023-10-20 PROCEDURE — 25510000001 PERFLUTREN (DEFINITY) 8.476 MG IN SODIUM CHLORIDE (PF) 0.9 % 10 ML INJECTION: Performed by: INTERNAL MEDICINE

## 2023-10-20 PROCEDURE — 25810000003 SODIUM CHLORIDE 0.9 % SOLUTION: Performed by: ANESTHESIOLOGY

## 2023-10-20 PROCEDURE — 25010000002 ONDANSETRON PER 1 MG

## 2023-10-20 PROCEDURE — 83605 ASSAY OF LACTIC ACID: CPT | Performed by: EMERGENCY MEDICINE

## 2023-10-20 PROCEDURE — 25010000002 PROPOFOL 10 MG/ML EMULSION: Performed by: ANESTHESIOLOGY

## 2023-10-20 PROCEDURE — 87536 HIV-1 QUANT&REVRSE TRNSCRPJ: CPT | Performed by: INTERNAL MEDICINE

## 2023-10-20 PROCEDURE — 25010000002 EPINEPHRINE PER 0.1 MG: Performed by: INTERNAL MEDICINE

## 2023-10-20 PROCEDURE — 43255 EGD CONTROL BLEEDING ANY: CPT | Performed by: INTERNAL MEDICINE

## 2023-10-20 PROCEDURE — 83735 ASSAY OF MAGNESIUM: CPT | Performed by: INTERNAL MEDICINE

## 2023-10-20 PROCEDURE — 93306 TTE W/DOPPLER COMPLETE: CPT | Performed by: INTERNAL MEDICINE

## 2023-10-20 PROCEDURE — 93306 TTE W/DOPPLER COMPLETE: CPT

## 2023-10-20 PROCEDURE — 36430 TRANSFUSION BLD/BLD COMPNT: CPT

## 2023-10-20 PROCEDURE — P9016 RBC LEUKOCYTES REDUCED: HCPCS

## 2023-10-20 PROCEDURE — 86900 BLOOD TYPING SEROLOGIC ABO: CPT

## 2023-10-20 PROCEDURE — 85025 COMPLETE CBC W/AUTO DIFF WBC: CPT | Performed by: INTERNAL MEDICINE

## 2023-10-20 PROCEDURE — 0W3P8ZZ CONTROL BLEEDING IN GASTROINTESTINAL TRACT, VIA NATURAL OR ARTIFICIAL OPENING ENDOSCOPIC: ICD-10-PCS | Performed by: INTERNAL MEDICINE

## 2023-10-20 RX ORDER — BICTEGRAVIR SODIUM, EMTRICITABINE, AND TENOFOVIR ALAFENAMIDE FUMARATE 50; 200; 25 MG/1; MG/1; MG/1
1 TABLET ORAL DAILY
COMMUNITY

## 2023-10-20 RX ORDER — METHYLPREDNISOLONE SODIUM SUCCINATE 40 MG/ML
20 INJECTION, POWDER, LYOPHILIZED, FOR SOLUTION INTRAMUSCULAR; INTRAVENOUS DAILY
Status: DISCONTINUED | OUTPATIENT
Start: 2023-10-20 | End: 2023-10-21

## 2023-10-20 RX ORDER — NICOTINE POLACRILEX 4 MG
15 LOZENGE BUCCAL
Status: DISCONTINUED | OUTPATIENT
Start: 2023-10-20 | End: 2023-10-22 | Stop reason: HOSPADM

## 2023-10-20 RX ORDER — MORPHINE SULFATE 2 MG/ML
2 INJECTION, SOLUTION INTRAMUSCULAR; INTRAVENOUS EVERY 4 HOURS PRN
Status: DISCONTINUED | OUTPATIENT
Start: 2023-10-20 | End: 2023-10-22 | Stop reason: HOSPADM

## 2023-10-20 RX ORDER — IBUPROFEN 600 MG/1
1 TABLET ORAL
Status: DISCONTINUED | OUTPATIENT
Start: 2023-10-20 | End: 2023-10-22 | Stop reason: HOSPADM

## 2023-10-20 RX ORDER — IBUPROFEN 600 MG/1
600 TABLET ORAL EVERY 8 HOURS PRN
COMMUNITY
End: 2023-10-22 | Stop reason: HOSPADM

## 2023-10-20 RX ORDER — PREGABALIN 150 MG/1
150 CAPSULE ORAL 2 TIMES DAILY
COMMUNITY
End: 2023-10-22 | Stop reason: HOSPADM

## 2023-10-20 RX ORDER — PROPOFOL 10 MG/ML
VIAL (ML) INTRAVENOUS AS NEEDED
Status: DISCONTINUED | OUTPATIENT
Start: 2023-10-20 | End: 2023-10-20 | Stop reason: SURG

## 2023-10-20 RX ORDER — AMITRIPTYLINE HYDROCHLORIDE 25 MG/1
25 TABLET, FILM COATED ORAL NIGHTLY
COMMUNITY
End: 2023-10-22 | Stop reason: HOSPADM

## 2023-10-20 RX ORDER — ONDANSETRON 2 MG/ML
4 INJECTION INTRAMUSCULAR; INTRAVENOUS EVERY 6 HOURS PRN
Status: DISCONTINUED | OUTPATIENT
Start: 2023-10-20 | End: 2023-10-22 | Stop reason: HOSPADM

## 2023-10-20 RX ORDER — DEXTROSE MONOHYDRATE 25 G/50ML
25 INJECTION, SOLUTION INTRAVENOUS
Status: DISCONTINUED | OUTPATIENT
Start: 2023-10-20 | End: 2023-10-22 | Stop reason: HOSPADM

## 2023-10-20 RX ORDER — PANTOPRAZOLE SODIUM 40 MG/10ML
40 INJECTION, POWDER, LYOPHILIZED, FOR SOLUTION INTRAVENOUS EVERY 12 HOURS SCHEDULED
Status: DISCONTINUED | OUTPATIENT
Start: 2023-10-20 | End: 2023-10-22 | Stop reason: HOSPADM

## 2023-10-20 RX ORDER — DAPSONE 100 MG/1
100 TABLET ORAL DAILY
COMMUNITY
End: 2023-10-22 | Stop reason: HOSPADM

## 2023-10-20 RX ORDER — DULOXETIN HYDROCHLORIDE 30 MG/1
30 CAPSULE, DELAYED RELEASE ORAL DAILY
Status: ON HOLD | COMMUNITY
End: 2023-10-21

## 2023-10-20 RX ORDER — SODIUM CHLORIDE 9 MG/ML
INJECTION, SOLUTION INTRAVENOUS CONTINUOUS PRN
Status: DISCONTINUED | OUTPATIENT
Start: 2023-10-20 | End: 2023-10-20 | Stop reason: SURG

## 2023-10-20 RX ADMIN — PANTOPRAZOLE SODIUM 8 MG/HR: 40 INJECTION, POWDER, FOR SOLUTION INTRAVENOUS at 08:24

## 2023-10-20 RX ADMIN — PROPOFOL 50 MG: 10 INJECTION, EMULSION INTRAVENOUS at 14:48

## 2023-10-20 RX ADMIN — PANTOPRAZOLE SODIUM 40 MG: 40 INJECTION, POWDER, FOR SOLUTION INTRAVENOUS at 20:30

## 2023-10-20 RX ADMIN — PROPOFOL 50 MG: 10 INJECTION, EMULSION INTRAVENOUS at 14:50

## 2023-10-20 RX ADMIN — BICTEGRAVIR SODIUM, EMTRICITABINE, AND TENOFOVIR ALAFENAMIDE FUMARATE 1 TABLET: 50; 200; 25 TABLET ORAL at 18:03

## 2023-10-20 RX ADMIN — MORPHINE SULFATE 2 MG: 2 INJECTION, SOLUTION INTRAMUSCULAR; INTRAVENOUS at 04:59

## 2023-10-20 RX ADMIN — MORPHINE SULFATE 2 MG: 2 INJECTION, SOLUTION INTRAMUSCULAR; INTRAVENOUS at 01:21

## 2023-10-20 RX ADMIN — SODIUM CHLORIDE: 9 INJECTION, SOLUTION INTRAVENOUS at 14:42

## 2023-10-20 RX ADMIN — PERFLUTREN 3 ML: 6.52 INJECTION, SUSPENSION INTRAVENOUS at 13:02

## 2023-10-20 RX ADMIN — PANTOPRAZOLE SODIUM 8 MG/HR: 40 INJECTION, POWDER, FOR SOLUTION INTRAVENOUS at 13:03

## 2023-10-20 RX ADMIN — METHYLPREDNISOLONE SODIUM SUCCINATE 20 MG: 40 INJECTION, POWDER, FOR SOLUTION INTRAMUSCULAR; INTRAVENOUS at 08:21

## 2023-10-20 RX ADMIN — ONDANSETRON 4 MG: 2 INJECTION INTRAMUSCULAR; INTRAVENOUS at 20:30

## 2023-10-20 RX ADMIN — PANTOPRAZOLE SODIUM 8 MG/HR: 40 INJECTION, POWDER, FOR SOLUTION INTRAVENOUS at 02:47

## 2023-10-20 RX ADMIN — MORPHINE SULFATE 2 MG: 2 INJECTION, SOLUTION INTRAMUSCULAR; INTRAVENOUS at 20:30

## 2023-10-20 RX ADMIN — PERFLUTREN 3 ML: 6.52 INJECTION, SUSPENSION INTRAVENOUS at 12:51

## 2023-10-20 RX ADMIN — MORPHINE SULFATE 2 MG: 2 INJECTION, SOLUTION INTRAMUSCULAR; INTRAVENOUS at 13:28

## 2023-10-20 NOTE — ED TRIAGE NOTES
Pt to ED via EMS from home for SOA x 2-3days, also endorses frequent urination an frequent falls.   EMS reports that patient is hypotensive but were unsuccessful in getting an IV

## 2023-10-20 NOTE — CONSULTS
[facility]  Initial Inpatient Consult Note    Referring Provider: Dr. Muller    Reason for Consultation: GI bleed    Subjective     History of present illness:    68 y.o. male HIV positive, admitted with shortness of air, hemodynamic instability through the emergency room with melena x2 to 3 days.  Remote history of peptic ulcer disease.  Does take NSAIDs.  Does not take PPI at home.  Denies abdominal pain.  He was resuscitated with colloid and crystalloid in the emergency room and he is brought to the CCU.  This morning he denies shortness of air or chest pain he is hemodynamically stable.  He has had no melena today.    Past Medical History:  Past Medical History:   Diagnosis Date    Arthritis     Colon cancer     Diabetes mellitus      Past Surgical History:  Past Surgical History:   Procedure Laterality Date    FEMUR SURGERY      JOINT REPLACEMENT        Social History:   Social History     Tobacco Use    Smoking status: Never     Passive exposure: Never    Smokeless tobacco: Not on file   Substance Use Topics    Alcohol use: Not Currently     Comment: SELDOM      Family History:  Family History   Problem Relation Age of Onset    No Known Problems Mother     Diabetes Father        Home Meds:  Medications Prior to Admission   Medication Sig Dispense Refill Last Dose    aspirin 81 MG EC tablet Take 81 mg by mouth daily.       dicyclomine (BENTYL) 10 MG capsule Take 1 capsule by mouth 3 (Three) Times a Day As Needed (abdominal pain or cramping). 20 capsule 0     famotidine (PEPCID) 20 MG tablet Take 1 tablet by mouth 2 (Two) Times a Day. 20 tablet 0     HYDROcodone-acetaminophen (NORCO) 5-325 MG per tablet Take 1 tablet by mouth Every 12 (Twelve) Hours As Needed for Moderate Pain for up to 8 doses. 8 tablet 0     ibuprofen (ADVIL,MOTRIN) 800 MG tablet Take 1 tablet by mouth 3 (Three) Times a Day With Meals. 90 tablet 0     Ibuprofen 200 MG capsule Take  by mouth.       metFORMIN (GLUCOPHAGE)  1000 MG tablet Take 1,000 mg by mouth 2 (two) times a day with meals.       ondansetron (ZOFRAN) 4 MG tablet Take 1 tablet by mouth Every 4 (Four) Hours As Needed for Nausea or Vomiting. 20 tablet 0     predniSONE (DELTASONE) 10 MG tablet Take 10 mg by mouth daily.       valACYclovir (VALTREX) 1000 MG tablet Take 2 tablets now and repeat one time in 12 hours 4 tablet 0      Current Meds:   insulin regular, 2-9 Units, Subcutaneous, Q6H  methylPREDNISolone sodium succinate, 20 mg, Intravenous, Daily      Allergies:  Allergies   Allergen Reactions    Tree Nut Anaphylaxis    Penicillins Swelling    Sulfa Antibiotics Dizziness     Not certain.  Was concerned about dermatitis of legs.     Review of Systems  There is weakness and fatigue all other systems reviewed and negative     Objective     Vital Signs  Temp:  [97.6 °F (36.4 °C)-98.2 °F (36.8 °C)] 97.9 °F (36.6 °C)  Heart Rate:  [] 90  Resp:  [15-20] 18  BP: ()/(52-95) 132/63  Physical Exam:  General Appearance:    Alert, cooperative, in no acute distress   Head:    Normocephalic, without obvious abnormality, atraumatic   Eyes:          conjunctivae and sclerae normal, no   icterus   Throat:   no thrush, oral mucosa moist   Neck:   Supple, no adenopathy   Lungs:     Clear to auscultation bilaterally    Heart:    Regular rhythm and normal rate    Chest Wall:    No abnormalities observed   Abdomen:     Soft, nondistended, nontender; normal bowel sounds   Extremities:   no edema, no redness   Skin:   No bruising or rash   Psychiatric:  normal mood and insight     Results Review:   I reviewed the patient's new clinical results.    Results from last 7 days   Lab Units 10/19/23  2123   WBC 10*3/mm3 11.73*   HEMOGLOBIN g/dL 5.6*   HEMATOCRIT % 18.3*   PLATELETS 10*3/mm3 196     Results from last 7 days   Lab Units 10/19/23  2123   SODIUM mmol/L 142   POTASSIUM mmol/L 3.9   CHLORIDE mmol/L 111*   CO2 mmol/L 25.0   BUN mg/dL 49*   CREATININE mg/dL 0.97   CALCIUM  "mg/dL 8.9   BILIRUBIN mg/dL 0.2   ALK PHOS U/L 31*   ALT (SGPT) U/L 12   AST (SGOT) U/L 13   GLUCOSE mg/dL 132*         No results found for: \"LIPASE\"    Radiology:  CT Abdomen Pelvis With Contrast   Final Result       1. Slightly thick-walled appearance to the second portion of the   duodenum and proximal third portion of the duodenum. Duodenitis is not   completely excluded.   2. Mild colonic diverticulosis.       Radiation dose reduction techniques were utilized, including automated   exposure control and exposure modulation based on body size.           This report was finalized on 10/19/2023 11:50 PM by Dr. Karuna Browne M.D on Workstation: [id number]          XR Chest 1 View   Final Result         Electronically signed by Andres Monaco MD on 10-19-23 at 2301          Assessment & Plan   Active Hospital Problems    Diagnosis     **Acute upper GI bleed        Assessment:  Melena/GI bleed  At first hemodynamically unstable, but has been resuscitated with crystalloid and colloid and is stable  Abnormal imaging see above possible duodenal ulcer  Anemia  remote history of peptic ulcer disease    Plan:  IV Protonix  Serial hemoglobin  Transfusion parameters written  Okay for clear liquids for 2 more hours then strict n.p.o. for likely EGD in 8 hours  If he becomes hemodynamically unstable we may move EGD sooner  An EGD  Today  Further recommendations after EGD is complete      I discussed the patients findings and my recommendations with patient and nursing staff.    Guero Lake MD           "

## 2023-10-20 NOTE — PROGRESS NOTES
"      [facility]         Dr Yarbrough Sayied   LOS: 1 day   Patient Care Team:  Provider, No Known as PCP - Gold Harrison MD (Internal Medicine)    Chief Complaint: Acute GI bleed with acute blood loss anemia hypotension on admission multiple medical issues    Interval History: Resting comfortably no signs of overt bleeding reported.  Protonix drip ongoing.    REVIEW OF SYSTEMS:   CARDIOVASCULAR: No chest pain, chest pressure or chest discomfort. No palpitations or edema.   RESPIRATORY: No shortness of breath, cough or sputum.   GASTROINTESTINAL: No anorexia, nausea, vomiting or diarrhea. No abdominal pain or blood.   HEMATOLOGIC: No bleeding or bruising.     Ventilator/Non-Invasive Ventilation Settings (From admission, onward)      None              Vital Signs  Temp:  [97.6 °F (36.4 °C)-98.2 °F (36.8 °C)] 97.8 °F (36.6 °C)  Heart Rate:  [] 90  Resp:  [15-20] 18  BP: ()/(52-95) 132/63    Intake/Output Summary (Last 24 hours) at 10/20/2023 0853  Last data filed at 10/20/2023 0800  Gross per 24 hour   Intake 2691 ml   Output 1250 ml   Net 1441 ml     Flowsheet Rows      Flowsheet Row First Filed Value   Admission Height 188 cm (74\") Documented at 10/19/2023 2046   Admission Weight 79.4 kg (175 lb) Documented at 10/19/2023 2046            Physical Exam:  Patient is examined using the personal protective equipment as per guidelines from infection control for this particular patient as enacted.  Hand hygiene was performed before and after patient interaction.   General Appearance:    Alert, cooperative, in no acute distress.  Following simple commands  ENT Mallampati between 3 and 4 no nasal congestion  Neck midline trachea, no thyromegaly   Lungs:     Clear to auscultation, respirations regular, even and                  unlabored    Heart:    Regular rhythm and normal rate, normal S1 and S2, no            murmur, no gallop, no rub, no click   Chest Wall:    No abnormalities " observed   Abdomen:     Normal bowel sounds, no masses, no organomegaly, soft        nontender, nondistended, no guarding, no rebound                tenderness   Extremities:   Moves all extremities well, no edema, no cyanosis, no             redness  CNS no focal neurological deficits normal sensory exam  Skin no rashes no nodules  Musculoskeletal no cyanosis no clubbing normal range of motion     Results Review:        Results from last 7 days   Lab Units 10/19/23  2123   SODIUM mmol/L 142   POTASSIUM mmol/L 3.9   CHLORIDE mmol/L 111*   CO2 mmol/L 25.0   BUN mg/dL 49*   CREATININE mg/dL 0.97   GLUCOSE mg/dL 132*   CALCIUM mg/dL 8.9     Results from last 7 days   Lab Units 10/19/23  2341 10/19/23  2123   HSTROP T ng/L 25* 28*     Results from last 7 days   Lab Units 10/19/23  2123   WBC 10*3/mm3 11.73*   HEMOGLOBIN g/dL 5.6*   HEMATOCRIT % 18.3*   PLATELETS 10*3/mm3 196             Results from last 7 days   Lab Units 10/20/23  0320   MAGNESIUM mg/dL 1.7               I reviewed the patient's new clinical results.  I personally viewed and interpreted the patient's chest x-ray.        Medication Review:   insulin regular, 2-9 Units, Subcutaneous, Q6H  methylPREDNISolone sodium succinate, 20 mg, Intravenous, Daily        pantoprazole, 8 mg/hr, Last Rate: 8 mg/hr (10/20/23 0824)        ASSESSMENT:   Acute blood loss anemia  GI bleed  Hypotension on admission  Recent use of NSAIDs for shingles pain  History colon cancer 2012  HIV disease on HAART  Ocular shingles  History MI ?  Cardiac murmur  RA on chronic prednisone  Diabetes mellitus type 2    PLAN:  Awaiting repeat hemoglobin posttransfusion.  2 unit PRBC ordered post hemoglobin 5.6.  Discussed with GI plans for EGD today later  Hemodynamically stable at this time  Protonix drip to continue per GI  Blood pressure stable  Blood glucose monitoring per ICU protocol  ICU core measures        Linnea Vega MD  10/20/23  08:53 EDT

## 2023-10-20 NOTE — PLAN OF CARE
Pt up to chair after EGD today. Protonix gtt dc'd. He has been tolerating clear liquid diet.       Goal Outcome Evaluation:      Problem: Adult Inpatient Plan of Care  Goal: Absence of Hospital-Acquired Illness or Injury  Intervention: Identify and Manage Fall Risk  Recent Flowsheet Documentation  Taken 10/20/2023 1600 by Suha Mack RN  Safety Promotion/Fall Prevention: safety round/check completed  Taken 10/20/2023 1500 by Suha Mack RN  Safety Promotion/Fall Prevention: safety round/check completed  Taken 10/20/2023 1400 by Suha Mack RN  Safety Promotion/Fall Prevention: safety round/check completed  Taken 10/20/2023 1300 by Suha Mack RN  Safety Promotion/Fall Prevention: safety round/check completed  Taken 10/20/2023 1200 by Suha Mack RN  Safety Promotion/Fall Prevention:   safety round/check completed   room organization consistent   lighting adjusted   fall prevention program maintained   clutter free environment maintained  Taken 10/20/2023 1100 by Suha Mack RN  Safety Promotion/Fall Prevention: safety round/check completed  Taken 10/20/2023 1000 by Suha Mack RN  Safety Promotion/Fall Prevention: safety round/check completed  Taken 10/20/2023 0900 by Suha Mack RN  Safety Promotion/Fall Prevention: safety round/check completed  Taken 10/20/2023 0800 by Suha Mack RN  Safety Promotion/Fall Prevention:   safety round/check completed   room organization consistent   lighting adjusted   clutter free environment maintained  Intervention: Prevent Skin Injury  Recent Flowsheet Documentation  Taken 10/20/2023 1500 by Suha Mack RN  Body Position: position changed independently  Taken 10/20/2023 1400 by Suha Mack RN  Body Position: position changed independently  Taken 10/20/2023 1300 by Suha Mack RN  Body Position: position changed independently  Taken 10/20/2023 1200 by Suha Mack RN  Body Position: position changed independently  Taken 10/20/2023 1000 by Suha Mack RN  Body  Position: position changed independently  Taken 10/20/2023 0800 by Suha Mack RN  Body Position: position changed independently  Skin Protection:   adhesive use limited   tubing/devices free from skin contact   transparent dressing maintained  Intervention: Prevent and Manage VTE (Venous Thromboembolism) Risk  Recent Flowsheet Documentation  Taken 10/20/2023 0800 by Suha Mack RN  Activity Management: bedrest  VTE Prevention/Management:   bilateral   sequential compression devices on  Range of Motion: active ROM (range of motion) encouraged  Intervention: Prevent Infection  Recent Flowsheet Documentation  Taken 10/20/2023 0800 by Suha Mack RN  Infection Prevention:   rest/sleep promoted   single patient room provided   hand hygiene promoted   equipment surfaces disinfected  Goal: Optimal Comfort and Wellbeing  Intervention: Provide Person-Centered Care  Recent Flowsheet Documentation  Taken 10/20/2023 0800 by Suha Mack RN  Trust Relationship/Rapport:   care explained   questions encouraged   questions answered   reassurance provided     Problem: Skin Injury Risk Increased  Goal: Skin Health and Integrity  Intervention: Optimize Skin Protection  Recent Flowsheet Documentation  Taken 10/20/2023 0800 by Suha Mack RN  Pressure Reduction Techniques: frequent weight shift encouraged  Head of Bed (HOB) Positioning: HOB at 30-45 degrees  Pressure Reduction Devices: pressure-redistributing mattress utilized  Skin Protection:   adhesive use limited   tubing/devices free from skin contact   transparent dressing maintained

## 2023-10-20 NOTE — H&P
[facility]  [phone]  Dr. Kennedy Muller      Subjective   LOS: 0 days     I was asked to admit this 68-year-old male from the ED.  Patient presented with shortness of breath.  He is also been dizzy lightheaded and having falls.  His admission hemoglobin was 5.6.  Patient states he has been taking ibuprofen twice a day for the last week or so because of pain from shingles on his face.  He reports a history of ulcers and had endoscopy in the past.  However he cannot give me details.  He has a history of colon cancer in 2012 with previous resection and has seen Dr. Ronald Sky at [place].  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  He has HIV disease since 2011 treated with HAART therapy by Dr. Andrew Don at [facility].  Patient has been intermittently compliant with his HAART therapy.  Patient did not volunteer history of HIV disease and this was discovered in care everywhere.  Additionally history includes MI in the past and previous stroke as well as a reported diagnosis of rheumatoid arthritis and his med list includes prednisone 10 mg daily.  He developed ocular shingles and was hospitalized for the same the last few months, however this is now getting better.  Has diabetes controlled apparently on metformin.  Patient states he is a never smoker and no alcohol intake.    Beth Mckinney  reports current alcohol use.,  reports that he has never smoked. He does not have any smokeless tobacco history on file.     Past Hx:  has a past medical history of Arthritis, Colon cancer, and Diabetes mellitus.  Surg Hx:  has a past surgical history that includes Joint replacement and Femur Surgery.  FH: family history includes Diabetes in his father; No Known Problems in his mother.  SH:  reports that he has never smoked. He does not have any smokeless tobacco history on file. He reports current alcohol use. No history on file for drug use.    Medications Prior to Admission   Medication  Sig Dispense Refill Last Dose    aspirin 81 MG EC tablet Take 81 mg by mouth daily.       dicyclomine (BENTYL) 10 MG capsule Take 1 capsule by mouth 3 (Three) Times a Day As Needed (abdominal pain or cramping). 20 capsule 0     famotidine (PEPCID) 20 MG tablet Take 1 tablet by mouth 2 (Two) Times a Day. 20 tablet 0     HYDROcodone-acetaminophen (NORCO) 5-325 MG per tablet Take 1 tablet by mouth Every 12 (Twelve) Hours As Needed for Moderate Pain for up to 8 doses. 8 tablet 0     ibuprofen (ADVIL,MOTRIN) 800 MG tablet Take 1 tablet by mouth 3 (Three) Times a Day With Meals. 90 tablet 0     Ibuprofen 200 MG capsule Take  by mouth.       metFORMIN (GLUCOPHAGE) 1000 MG tablet Take 1,000 mg by mouth 2 (two) times a day with meals.       ondansetron (ZOFRAN) 4 MG tablet Take 1 tablet by mouth Every 4 (Four) Hours As Needed for Nausea or Vomiting. 20 tablet 0     predniSONE (DELTASONE) 10 MG tablet Take 10 mg by mouth daily.       valACYclovir (VALTREX) 1000 MG tablet Take 2 tablets now and repeat one time in 12 hours 4 tablet 0      Allergies   Allergen Reactions    Tree Nut Anaphylaxis    Penicillins Swelling    Sulfa Antibiotics Dizziness     Not certain.  Was concerned about dermatitis of legs.       Review of Systems   Constitutional:  Negative for chills and fever.   HENT:  Negative for congestion and sore throat.    Respiratory:  Negative for cough and shortness of breath.    Cardiovascular:  Negative for chest pain and leg swelling.   Gastrointestinal:  Negative for abdominal pain, nausea and vomiting.   Genitourinary:  Negative for dysuria and hematuria.   Musculoskeletal:  Positive for arthralgias. Negative for back pain.   Skin:  Positive for pallor and rash.   Neurological:  Positive for dizziness and light-headedness.   Psychiatric/Behavioral:  Negative for agitation and confusion.        Vital Signs past 24hrs  BP range: BP: ()/(55-71) 120/71  Pulse range: Heart Rate:  [] 101  Resp rate range:  Resp:  [18] 18  Temp range: Temp (24hrs), Av.6 °F (36.4 °C), Min:97.6 °F (36.4 °C), Max:97.6 °F (36.4 °C)    Oxygen range: SpO2:  [96 %-100 %] 100 %;  ;   Device (Oxygen Therapy): room air  79.4 kg (175 lb); Body mass index is 22.47 kg/m².  Net IO Since Admission: 1,100 mL [10/19/23 1037]      Adult male sitting up in bed.  No acute distress at this time.  He appears thin but otherwise well-built.  Pupils equal reactive to light.  Oropharynx moist class II Mallampati airway.  No posterior pharyngeal discharge.  Nasopharynx without discharge septum midline.  JVP not elevated trachea midline thyroid not enlarged.  Lungs reveal bilateral air entry clear to auscultation no rales rhonchi wheeze.  Percussion note resonant chest expansion equal no chest wall deformity or tenderness.  Heart examination S1-S2 present rhythm regular.  Systolic murmur present suggestive of aortic stenosis.  No edema lower extremities.  Abdomen is soft nontender bowel sounds present no liver spleen enlargement.  No peripheral cyanosis clubbing.  Moves all 4 extremities sensorimotor intact.  No cervical, axillary, inguinal adenopathy.  Rash noted around the left eye from shingles vesicular rash that appears to be resolving.    Results Review:    I have reviewed the laboratory and imaging data from current admission. My annotations are as noted in assessment and plan.  Result Review:  I have personally reviewed the results from the time of this admission to 10/19/2023 23:47 EDT and agree with these findings:  [x]  Laboratory list / accordion  [x]  Microbiology  [x]  Radiology  [x]  EKG/Telemetry   [x]  Cardiology/Vascular   []  Pathology  []  Old records  []  Other:      Medication Review:  I have reviewed the current MAR. My annotations are as noted in assessment and plan.    pantoprazole, 8 mg/hr, Last Rate: 8 mg/hr (10/19/23 5995)      Lines, Drains & Airways       Active LDAs       Name Placement date Placement time Site Days     Peripheral IV Right;Posterior Forearm --  --  Forearm  --    Peripheral IV 10/19/23 2231 Left;Anterior Forearm 10/19/23  2231  Forearm  less than 1                  Isolation status: No active isolations        Isolation:  No active isolations    PCCM Problems  Acute blood loss anemia  GI bleed  Hypotension on admission  Recent use of NSAIDs for shingles pain  History colon cancer 2012  HIV disease on HAART  Ocular shingles  History MI ?  Cardiac murmur  RA on chronic prednisone  Diabetes mellitus type 2      Plan of Treatment    Acute blood loss anemia and likely GI bleed.  Note recent use of NSAIDs may be contributing.  Consult GI and continue Protonix drip.  GI was contacted by ED physician.    Hypotension on admission but now normotensive.  Continue to monitor.    History colon cancer therefore obtain CT abdomen and review.    Cardiac murmur present likely of aortic stenosis.  Get echo in the morning.    Follows with Dr. Andrew Don of [facility] for HIV disease and ocular shingles.    Apparently on chronic prednisone at 10 mg/day.  We will continue for now.  I do not think stress dose steroids are required at this time.  However since patient will be n.p.o. we will instead use Solu-Medrol 20 mg daily.    Sliding scale for diabetes.    SCD's.    I spent 35 mins critical care time in care of this patient outside of any procedures.     Electronically signed by Kennedy Muller MD, 10/19/23, 11:47 PM EDT.      Part of this note may be an electronic transcription/translation of spoken language to printed text using the Dragon Dictation System.

## 2023-10-20 NOTE — ED PROVIDER NOTES
EMERGENCY DEPARTMENT ENCOUNTER    Room Number:  10/10  PCP: Provider, No Known  Historian: Patient      HPI:  Chief Complaint: Short of breath, burning with urination  A complete HPI/ROS/PMH/PSH/SH/FH are unobtainable due to: Nothing  Context: Beth Mckinney is a 68 y.o. male who presents to the ED c/o shortness of breath and burning with urination.  Patient states that he has recently been on gabapentin for pain in the left side of his face secondary to shingles that he developed recently.  He reports that he thinks the gabapentin was making him too drowsy so he stopped taking it as of Tuesday night, about 48 hours ago.  Within the last couple of days he is also developed some burning with urination.  He denies history of frequent urinary tract infections.  Patient also states that he has been short of breath when he tries to get up and walk.  He denies cough fever or chills.  He denies chest pain.      Patient denies history of heart murmur.      PAST MEDICAL HISTORY  Active Ambulatory Problems     Diagnosis Date Noted    No Active Ambulatory Problems     Resolved Ambulatory Problems     Diagnosis Date Noted    No Resolved Ambulatory Problems     Past Medical History:   Diagnosis Date    Arthritis     Colon cancer     Diabetes mellitus          PAST SURGICAL HISTORY  Past Surgical History:   Procedure Laterality Date    FEMUR SURGERY      JOINT REPLACEMENT           FAMILY HISTORY  Family History   Problem Relation Age of Onset    No Known Problems Mother     Diabetes Father          SOCIAL HISTORY  Social History     Socioeconomic History    Marital status: Single   Tobacco Use    Smoking status: Never   Substance and Sexual Activity    Alcohol use: Yes     Comment: SELDOM         ALLERGIES  Tree nut, Penicillins, and Sulfa antibiotics        REVIEW OF SYSTEMS  Review of Systems   Review of all 14 systems is negative other than stated in the HPI above.      PHYSICAL EXAM  ED Triage Vitals [10/19/23  2046]   Temp Heart Rate Resp BP SpO2   97.6 °F (36.4 °C) 106 18 (!) 89/55 100 %      Temp src Heart Rate Source Patient Position BP Location FiO2 (%)   Oral Monitor Sitting Right arm --       Physical Exam      GENERAL: Awake and alert, no acute distress  HENT: nares patent  EYES: no scleral icterus, EOMI  CV: regular rhythm, normal rate, systolic murmur noted loudest at the apex, no peripheral edema  RESPIRATORY: normal effort, lungs clear to auscultation bilaterally without wheezing or crackles  ABDOMEN: soft, nondistended, nontender throughout  MUSCULOSKELETAL: no deformity  NEURO: alert, moves all extremities, follows commands, cranial nerves II through XII grossly intact, speech fluent and clear  PSYCH:  calm, cooperative  SKIN: warm, dry, hyperpigmentation noted on the left side of the face without current vesicles    Vital signs and nursing notes reviewed.          LAB RESULTS  Recent Results (from the past 24 hour(s))   ECG 12 Lead Dyspnea    Collection Time: 10/19/23  8:58 PM   Result Value Ref Range    QT Interval 355 ms    QTC Interval 454 ms   Urinalysis With Microscopic If Indicated (No Culture) - Urine, Clean Catch    Collection Time: 10/19/23  9:04 PM    Specimen: Urine, Clean Catch   Result Value Ref Range    Color, UA Yellow Yellow, Straw    Appearance, UA Clear Clear    pH, UA 5.5 5.0 - 8.0    Specific Gravity, UA 1.019 1.005 - 1.030    Glucose, UA Negative Negative    Ketones, UA Negative Negative    Bilirubin, UA Negative Negative    Blood, UA Negative Negative    Protein, UA Negative Negative    Leuk Esterase, UA Small (1+) (A) Negative    Nitrite, UA Negative Negative    Urobilinogen, UA 0.2 E.U./dL 0.2 - 1.0 E.U./dL   Urinalysis, Microscopic Only - Urine, Clean Catch    Collection Time: 10/19/23  9:04 PM    Specimen: Urine, Clean Catch   Result Value Ref Range    RBC, UA 6-10 (A) None Seen, 0-2 /HPF    WBC, UA Too Numerous to Count (A) None Seen, 0-2 /HPF    Bacteria, UA 3+ (A) None Seen /HPF     Squamous Epithelial Cells, UA 13-20 (A) None Seen, 0-2 /HPF    Hyaline Casts, UA None Seen None Seen /LPF    Methodology Manual Light Microscopy    Comprehensive Metabolic Panel    Collection Time: 10/19/23  9:23 PM    Specimen: Blood   Result Value Ref Range    Glucose 132 (H) 65 - 99 mg/dL    BUN 49 (H) 8 - 23 mg/dL    Creatinine 0.97 0.76 - 1.27 mg/dL    Sodium 142 136 - 145 mmol/L    Potassium 3.9 3.5 - 5.2 mmol/L    Chloride 111 (H) 98 - 107 mmol/L    CO2 25.0 22.0 - 29.0 mmol/L    Calcium 8.9 8.6 - 10.5 mg/dL    Total Protein 5.3 (L) 6.0 - 8.5 g/dL    Albumin 2.6 (L) 3.5 - 5.2 g/dL    ALT (SGPT) 12 1 - 41 U/L    AST (SGOT) 13 1 - 40 U/L    Alkaline Phosphatase 31 (L) 39 - 117 U/L    Total Bilirubin 0.2 0.0 - 1.2 mg/dL    Globulin 2.7 gm/dL    A/G Ratio 1.0 g/dL    BUN/Creatinine Ratio 50.5 (H) 7.0 - 25.0    Anion Gap 6.0 5.0 - 15.0 mmol/L    eGFR 86.1 >60.0 mL/min/1.73   BNP    Collection Time: 10/19/23  9:23 PM    Specimen: Blood   Result Value Ref Range    proBNP <36.0 0.0 - 900.0 pg/mL   D-dimer, Quantitative    Collection Time: 10/19/23  9:23 PM    Specimen: Blood   Result Value Ref Range    D-Dimer, Quantitative 0.48 0.00 - 0.66 MCGFEU/mL   High Sensitivity Troponin T    Collection Time: 10/19/23  9:23 PM    Specimen: Blood   Result Value Ref Range    HS Troponin T 28 (H) <15 ng/L   CBC Auto Differential    Collection Time: 10/19/23  9:23 PM    Specimen: Blood   Result Value Ref Range    WBC 11.73 (H) 3.40 - 10.80 10*3/mm3    RBC 2.06 (L) 4.14 - 5.80 10*6/mm3    Hemoglobin 5.6 (C) 13.0 - 17.7 g/dL    Hematocrit 18.3 (C) 37.5 - 51.0 %    MCV 88.8 79.0 - 97.0 fL    MCH 27.2 26.6 - 33.0 pg    MCHC 30.6 (L) 31.5 - 35.7 g/dL    RDW 15.4 12.3 - 15.4 %    RDW-SD 48.0 37.0 - 54.0 fl    MPV 11.0 6.0 - 12.0 fL    Platelets 196 140 - 450 10*3/mm3    Neutrophil % 65.9 42.7 - 76.0 %    Lymphocyte % 23.9 19.6 - 45.3 %    Monocyte % 7.6 5.0 - 12.0 %    Eosinophil % 1.3 0.3 - 6.2 %    Basophil % 0.4 0.0 - 1.5 %     Immature Grans % 0.9 (H) 0.0 - 0.5 %    Neutrophils, Absolute 7.74 (H) 1.70 - 7.00 10*3/mm3    Lymphocytes, Absolute 2.80 0.70 - 3.10 10*3/mm3    Monocytes, Absolute 0.89 0.10 - 0.90 10*3/mm3    Eosinophils, Absolute 0.15 0.00 - 0.40 10*3/mm3    Basophils, Absolute 0.05 0.00 - 0.20 10*3/mm3    Immature Grans, Absolute 0.10 (H) 0.00 - 0.05 10*3/mm3    nRBC 0.3 (H) 0.0 - 0.2 /100 WBC   Ferritin    Collection Time: 10/19/23  9:23 PM    Specimen: Blood   Result Value Ref Range    Ferritin 47.50 30.00 - 400.00 ng/mL   Haptoglobin    Collection Time: 10/19/23  9:23 PM    Specimen: Blood   Result Value Ref Range    Haptoglobin 105 30 - 200 mg/dL   Iron Profile    Collection Time: 10/19/23  9:23 PM    Specimen: Blood   Result Value Ref Range    Iron 44 (L) 59 - 158 mcg/dL    Iron Saturation (TSAT) 17 (L) 20 - 50 %    Transferrin 176 (L) 200 - 360 mg/dL    TIBC 262 (L) 298 - 536 mcg/dL   Lactate Dehydrogenase    Collection Time: 10/19/23  9:23 PM    Specimen: Blood   Result Value Ref Range     (L) 135 - 225 U/L   Reticulocytes    Collection Time: 10/19/23  9:23 PM    Specimen: Blood   Result Value Ref Range    Reticulocyte % 3.19 (H) 0.70 - 1.90 %    Reticulocyte Absolute 0.0638 0.0200 - 0.1300 10*6/mm3       Ordered the above labs and reviewed the results.        RADIOLOGY  XR Chest 1 View    Result Date: 10/19/2023  SINGLE VIEW OF THE CHEST  HISTORY: Shortness of breath  COMPARISON: None available.  FINDINGS: Heart size is within normal limits. No pneumothorax, pleural effusion, or acute infiltrate is seen. There are advanced degenerative changes involving the left shoulder.      No acute findings.  This report was finalized on 10/19/2023 9:40 PM by Dr. Karuna Browne M.D on Workstation: [id number]       Ordered the above noted radiological studies. Reviewed by me in PACS.            PROCEDURES  Critical Care    Performed by: Abdiel Salas MD  Authorized by: Abdiel Salas MD    Critical care  provider statement:     Critical care time (minutes):  40    Critical care time was exclusive of:  Separately billable procedures and treating other patients    Critical care was time spent personally by me on the following activities:  Pulse oximetry, ordering and review of radiographic studies, ordering and review of laboratory studies, ordering and performing treatments and interventions, discussions with consultants, evaluation of patient's response to treatment, examination of patient, review of old charts, re-evaluation of patient's condition, obtaining history from patient or surrogate and development of treatment plan with patient or surrogate                MEDICATIONS GIVEN IN ER  Medications   sodium chloride 0.9 % flush 10 mL (has no administration in time range)   sodium chloride 0.9 % bolus 1,000 mL (1,000 mL Intravenous New Bag 10/19/23 2206)   pantoprazole (PROTONIX) 40 mg in 100 mL NS (VTB) (has no administration in time range)   ketorolac (TORADOL) injection 15 mg (15 mg Intravenous Given 10/19/23 2206)   cefTRIAXone (ROCEPHIN) 2,000 mg in sodium chloride 0.9 % 100 mL IVPB-VTB (2,000 mg Intravenous New Bag 10/19/23 2206)   pantoprazole (PROTONIX) injection 80 mg (80 mg Intravenous Given 10/19/23 2231)                   MEDICAL DECISION MAKING, PROGRESS, and CONSULTS    All labs have been independently reviewed by me.  All radiology studies have been reviewed by me and I have also reviewed the radiology report.   EKG's independently viewed and interpreted by me.  Discussion below represents my analysis of pertinent findings related to patient's condition, differential diagnosis, treatment plan and final disposition.      Additional sources:  - Discussed/ obtained information from independent historians: N/A    - External (non-ED) record review: Recent ID visit notes reviewed and summarized below    - Chronic or social conditions impacting care: HIV    - Shared decision making: Patient informed of  plan for admission to ICU, blood transfusion      Orders placed during this visit:  Orders Placed This Encounter   Procedures    Critical Care    Blood Culture - Blood,    Blood Culture - Blood,    Urine Culture - Urine,    XR Chest 1 View    CT Abdomen Pelvis With Contrast    Comprehensive Metabolic Panel    Urinalysis With Microscopic If Indicated (No Culture) - Urine, Clean Catch    BNP    D-dimer, Quantitative    High Sensitivity Troponin T    CBC Auto Differential    Urinalysis, Microscopic Only - Urine, Clean Catch    Lactic Acid, Plasma    Vitamin B12    Folate    Ferritin    Haptoglobin    Iron Profile    Lactate Dehydrogenase    Reticulocytes    High Sensitivity Troponin T 2Hr    Pulse Oximetry, Continuous    Monitor Blood Pressure    Verify Informed Consent for Blood Product Administration    Gastroenterology (on-call MD unless specified)    Pulmonology (on-call MD unless specified)    POCT Occult Blood Stool    ECG 12 Lead Dyspnea    Type & Screen    Prepare RBC, 2 Units    Direct Antiglobulin Test (Direct Misty)    Insert Peripheral IV    Inpatient Admission    CBC & Differential           Differential diagnosis includes but is not limited to:    Sepsis  Pulmonary embolus  Upper GI bleed  Acute coronary syndrome        Independent interpretation of labs, radiology studies, and discussions with consultants:  ED Course as of 10/19/23 2251   u Oct 19, 2023   2106 BP(!): 89/55 [JR]   2106 Temp: 97.6 °F (36.4 °C) [JR]   2106 Heart Rate: 106 [JR]   2106 SpO2: 100 % [JR]   2106 EKG          EKG time: 2058  Rhythm/Rate: Sinus rhythm, 98  P waves and MD: Normal  QRS, axis: Normal axis  ST and T waves: No acute ischemic changes    Interpreted Contemporaneously by me, independently viewed         [JR]   2126 Chest x-ray independently interpreted PACS.  Lung fields are clear bilaterally, no infiltrate, no pneumothorax, no pulmonary edema. [JR]   2130 WBC, UA(!): Too Numerous to Count [JR]   2140 Record review: I  reviewed recent ED visit from 10/9/2023.  Patient was seen after a fall.  He was prescribed hydrocodone for pain. [JR]   2143 Record review: I reviewed office visit with infectious disease from 9/12/2023.  Patient was seen in follow-up for HIV and AIDS.  He has been intermittently compliant with antiretroviral therapy.  As of 9/12/2023 he had moved to [place] with his mother and stopped taking Biktarvy.  Biktarvy restarted during that visit.  He also developed severe left ocular shingles in September.  He has a history of rheumatoid arthritis as well. [JR]   2155 D-Dimer, Quant: 0.48 [JR]   2208 BUN(!): 49 [JR]   2208 Creatinine: 0.97 [JR]   2245 Discussed with Dr. Muller, pulmonary and critical care medicine, who agrees admit to ICU.  Discussed with Dr. Cortes, GI, who will consult.  He requested a pantoprazole drip. [JR]   2249 Dr. Muller requested that a CT of the abdomen pelvis to be added due to patient's history of colon cancer. [JR]   2251 Rectal exam performed with NELY Driscoll at bedside as well as LALI Vides.  There was gross melena present, heme positive.   passed. [JR]      ED Course User Index  [JR] Abdiel Salas MD           DIAGNOSIS  Final diagnoses:   Acute upper GI bleed   Hypotension due to hypovolemia   Systolic murmur   Acute UTI   HIV infection, unspecified symptom status         DISPOSITION  Admit to ICU            Latest Documented Vital Signs:  As of 22:51 EDT  BP- 120/71 HR- 101 Temp- 97.6 °F (36.4 °C) (Oral) O2 sat- 100%              --    Please note that portions of this were completed with a voice recognition program.       Note Disclaimer: At [facility], we believe that sharing information builds trust and better relationships. You are receiving this note because you are receiving care at [facility] or recently visited. It is possible you will see health information before a provider has talked with you about it. This kind of information can be easy to  misunderstand. To help you fully understand what it means for your health, we urge you to discuss this note with your provider.             Abdiel Salas MD  10/19/23 8880       Abdiel Salas MD  10/19/23 8423       Abdiel Salas MD  10/19/23 9001

## 2023-10-20 NOTE — PAYOR COMM NOTE
"Beth Kimball (68 y.o. Male)                             ATTENTION; INPATIENT AUTHORIZATION REQUEST - ICD 10 - K92.2  , D64.9                              FAC[facility] NPI - [id number] [facility] [address] [address]                              PHYSICIAN NPI -   [id number] DR. STARK [address] [facility]. [address]                                REPLY TO UR DEPT  [phone] OR CALL            Date of Birth   1955    Social Security Number       Address   [address] [address]    Home Phone   [phone]    MRN   [id number]       Confucianism   Unknown    Marital Status   Single                            Admission Date   10/19/23    Admission Type   Emergency    Admitting Provider   Kennedy Stark MD    Attending Provider   Kennedy Stark MD    Department, Room/Bed   [facility], N341/1       Discharge Date       Discharge Disposition       Discharge Destination                                 Attending Provider: Kennedy Stark MD    Allergies: Tree Nut, Penicillins, Sulfa Antibiotics    Isolation: None   Infection: None   Code Status: Not on file    Ht: 188 cm (74\")   Wt: 72.4 kg (159 lb 9.8 oz)    Admission Cmt: None   Principal Problem: Acute upper GI bleed [K92.2]                   Active Insurance as of 10/19/2023       Primary Coverage       Payor Plan Insurance Group Employer/Plan Group    MEDICARE MEDICARE B ONLY        Payor Plan Address Payor Plan Phone Number Payor Plan Fax Number Effective Dates    [address] [address] [phone]  5/1/2022 - None Entered    [facility] [id number]         Subscriber Name Subscriber Birth Date Member ID       BETH KIMBALL 1955 [id number]               Secondary Coverage       Payor Plan Insurance Group Employer/Plan Group    [facility] [id number]       Payor Plan Address Payor Plan Phone Number Payor Plan Fax Number Effective " Dates    [address] [address]   1/1/2021 - None Entered    [facility] [id number]         Subscriber Name Subscriber Birth Date Member ID       BETH KIMBALL 1955 [id number]                     Emergency Contacts        (Rel.) Home Phone Work Phone Mobile Phone    Petra Whitfield (Sister) [phone] -- [phone]                 History & Physical        Kennedy Muller MD at 10/19/23 9867              [facility]  [phone]  Dr. Kennedy Muller      Subjective   LOS: 0 days     I was asked to admit this 68-year-old male from the ED.  Patient presented with shortness of breath.  He is also been dizzy lightheaded and having falls.  His admission hemoglobin was 5.6.  Patient states he has been taking ibuprofen twice a day for the last week or so because of pain from shingles on his face.  He reports a history of ulcers and had endoscopy in the past.  However he cannot give me details.  He has a history of colon cancer in 2012 with previous resection and has seen Dr. Ronald Sky at [place].  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  He has HIV disease since 2011 treated with HAART therapy by Dr. Andrew Don at [facility].  Patient has been intermittently compliant with his HAART therapy.  Patient did not volunteer history of HIV disease and this was discovered in care everywhere.  Additionally history includes MI in the past and previous stroke as well as a reported diagnosis of rheumatoid arthritis and his med list includes prednisone 10 mg daily.  He developed ocular shingles and was hospitalized for the same the last few months, however this is now getting better.  Has diabetes controlled apparently on metformin.  Patient states he is a never smoker and no alcohol intake.    Beth Kimball  reports current alcohol use.,  reports that he has never smoked. He does not have any smokeless tobacco history on file.     Past Hx:  has a past medical history of  Arthritis, Colon cancer, and Diabetes mellitus.  Surg Hx:  has a past surgical history that includes Joint replacement and Femur Surgery.  FH: family history includes Diabetes in his father; No Known Problems in his mother.  SH:  reports that he has never smoked. He does not have any smokeless tobacco history on file. He reports current alcohol use. No history on file for drug use.    Medications Prior to Admission   Medication Sig Dispense Refill Last Dose    aspirin 81 MG EC tablet Take 81 mg by mouth daily.       dicyclomine (BENTYL) 10 MG capsule Take 1 capsule by mouth 3 (Three) Times a Day As Needed (abdominal pain or cramping). 20 capsule 0     famotidine (PEPCID) 20 MG tablet Take 1 tablet by mouth 2 (Two) Times a Day. 20 tablet 0     HYDROcodone-acetaminophen (NORCO) 5-325 MG per tablet Take 1 tablet by mouth Every 12 (Twelve) Hours As Needed for Moderate Pain for up to 8 doses. 8 tablet 0     ibuprofen (ADVIL,MOTRIN) 800 MG tablet Take 1 tablet by mouth 3 (Three) Times a Day With Meals. 90 tablet 0     Ibuprofen 200 MG capsule Take  by mouth.       metFORMIN (GLUCOPHAGE) 1000 MG tablet Take 1,000 mg by mouth 2 (two) times a day with meals.       ondansetron (ZOFRAN) 4 MG tablet Take 1 tablet by mouth Every 4 (Four) Hours As Needed for Nausea or Vomiting. 20 tablet 0     predniSONE (DELTASONE) 10 MG tablet Take 10 mg by mouth daily.       valACYclovir (VALTREX) 1000 MG tablet Take 2 tablets now and repeat one time in 12 hours 4 tablet 0      Allergies   Allergen Reactions    Tree Nut Anaphylaxis    Penicillins Swelling    Sulfa Antibiotics Dizziness     Not certain.  Was concerned about dermatitis of legs.       Review of Systems   Constitutional:  Negative for chills and fever.   HENT:  Negative for congestion and sore throat.    Respiratory:  Negative for cough and shortness of breath.    Cardiovascular:  Negative for chest pain and leg swelling.   Gastrointestinal:  Negative for abdominal pain, nausea  and vomiting.   Genitourinary:  Negative for dysuria and hematuria.   Musculoskeletal:  Positive for arthralgias. Negative for back pain.   Skin:  Positive for pallor and rash.   Neurological:  Positive for dizziness and light-headedness.   Psychiatric/Behavioral:  Negative for agitation and confusion.        Vital Signs past 24hrs  BP range: BP: ()/(55-71) 120/71  Pulse range: Heart Rate:  [] 101  Resp rate range: Resp:  [18] 18  Temp range: Temp (24hrs), Av.6 °F (36.4 °C), Min:97.6 °F (36.4 °C), Max:97.6 °F (36.4 °C)    Oxygen range: SpO2:  [96 %-100 %] 100 %;  ;   Device (Oxygen Therapy): room air  79.4 kg (175 lb); Body mass index is 22.47 kg/m².  Net IO Since Admission: 1,100 mL [10/19/23 2347]      Adult male sitting up in bed.  No acute distress at this time.  He appears thin but otherwise well-built.  Pupils equal reactive to light.  Oropharynx moist class II Mallampati airway.  No posterior pharyngeal discharge.  Nasopharynx without discharge septum midline.  JVP not elevated trachea midline thyroid not enlarged.  Lungs reveal bilateral air entry clear to auscultation no rales rhonchi wheeze.  Percussion note resonant chest expansion equal no chest wall deformity or tenderness.  Heart examination S1-S2 present rhythm regular.  Systolic murmur present suggestive of aortic stenosis.  No edema lower extremities.  Abdomen is soft nontender bowel sounds present no liver spleen enlargement.  No peripheral cyanosis clubbing.  Moves all 4 extremities sensorimotor intact.  No cervical, axillary, inguinal adenopathy.  Rash noted around the left eye from shingles vesicular rash that appears to be resolving.    Results Review:    I have reviewed the laboratory and imaging data from current admission. My annotations are as noted in assessment and plan.  Result Review:  I have personally reviewed the results from the time of this admission to 10/19/2023 23:47 EDT and agree with these findings:  [x]   Laboratory list / accordion  [x]  Microbiology  [x]  Radiology  [x]  EKG/Telemetry   [x]  Cardiology/Vascular   []  Pathology  []  Old records  []  Other:      Medication Review:  I have reviewed the current MAR. My annotations are as noted in assessment and plan.    pantoprazole, 8 mg/hr, Last Rate: 8 mg/hr (10/19/23 6735)      Lines, Drains & Airways       Active LDAs       Name Placement date Placement time Site Days    Peripheral IV Right;Posterior Forearm --  --  Forearm  --    Peripheral IV 10/19/23 2231 Left;Anterior Forearm 10/19/23  2231  Forearm  less than 1                  Isolation status: No active isolations        Isolation:  No active isolations    PCCM Problems  Acute blood loss anemia  GI bleed  Hypotension on admission  Recent use of NSAIDs for shingles pain  History colon cancer 2012  HIV disease on HAART  Ocular shingles  History MI ?  Cardiac murmur  RA on chronic prednisone  Diabetes mellitus type 2      Plan of Treatment    Acute blood loss anemia and likely GI bleed.  Note recent use of NSAIDs may be contributing.  Consult GI and continue Protonix drip.  GI was contacted by ED physician.    Hypotension on admission but now normotensive.  Continue to monitor.    History colon cancer therefore obtain CT abdomen and review.    Cardiac murmur present likely of aortic stenosis.  Get echo in the morning.    Follows with Dr. Andrew Don of [facility] for HIV disease and ocular shingles.    Apparently on chronic prednisone at 10 mg/day.  We will continue for now.  I do not think stress dose steroids are required at this time.  However since patient will be n.p.o. we will instead use Solu-Medrol 20 mg daily.    Sliding scale for diabetes.    SCD's.    I spent 35 mins critical care time in care of this patient outside of any procedures.     Electronically signed by Kennedy Muller MD, 10/19/23, 11:47 PM EDT.      Part of this note may be an electronic transcription/translation of spoken language to printed  text using the Dragon Dictation System.      Electronically signed by Kennedy Muller MD at 10/20/23 0013          Emergency Department Notes        Abdiel Salas MD at 10/19/23 2143        Procedure Orders    1. Critical Care [[id number]] ordered by Abdiel Salas MD                  EMERGENCY DEPARTMENT ENCOUNTER    Room Number:  10/10  PCP: Provider, No Known  Historian: Patient      HPI:  Chief Complaint: Short of breath, burning with urination  A complete HPI/ROS/PMH/PSH/SH/FH are unobtainable due to: Nothing  Context: Beth Mckinney is a 68 y.o. male who presents to the ED c/o shortness of breath and burning with urination.  Patient states that he has recently been on gabapentin for pain in the left side of his face secondary to shingles that he developed recently.  He reports that he thinks the gabapentin was making him too drowsy so he stopped taking it as of Tuesday night, about 48 hours ago.  Within the last couple of days he is also developed some burning with urination.  He denies history of frequent urinary tract infections.  Patient also states that he has been short of breath when he tries to get up and walk.  He denies cough fever or chills.  He denies chest pain.      Patient denies history of heart murmur.      PAST MEDICAL HISTORY  Active Ambulatory Problems     Diagnosis Date Noted    No Active Ambulatory Problems     Resolved Ambulatory Problems     Diagnosis Date Noted    No Resolved Ambulatory Problems     Past Medical History:   Diagnosis Date    Arthritis     Colon cancer     Diabetes mellitus          PAST SURGICAL HISTORY  Past Surgical History:   Procedure Laterality Date    FEMUR SURGERY      JOINT REPLACEMENT           FAMILY HISTORY  Family History   Problem Relation Age of Onset    No Known Problems Mother     Diabetes Father          SOCIAL HISTORY  Social History     Socioeconomic History    Marital status: Single   Tobacco Use    Smoking status: Never    Substance and Sexual Activity    Alcohol use: Yes     Comment: SELDOM         ALLERGIES  Tree nut, Penicillins, and Sulfa antibiotics        REVIEW OF SYSTEMS  Review of Systems   Review of all 14 systems is negative other than stated in the HPI above.      PHYSICAL EXAM  ED Triage Vitals [10/19/23 2046]   Temp Heart Rate Resp BP SpO2   97.6 °F (36.4 °C) 106 18 (!) 89/55 100 %      Temp src Heart Rate Source Patient Position BP Location FiO2 (%)   Oral Monitor Sitting Right arm --       Physical Exam      GENERAL: Awake and alert, no acute distress  HENT: nares patent  EYES: no scleral icterus, EOMI  CV: regular rhythm, normal rate, systolic murmur noted loudest at the apex, no peripheral edema  RESPIRATORY: normal effort, lungs clear to auscultation bilaterally without wheezing or crackles  ABDOMEN: soft, nondistended, nontender throughout  MUSCULOSKELETAL: no deformity  NEURO: alert, moves all extremities, follows commands, cranial nerves II through XII grossly intact, speech fluent and clear  PSYCH:  calm, cooperative  SKIN: warm, dry, hyperpigmentation noted on the left side of the face without current vesicles    Vital signs and nursing notes reviewed.          LAB RESULTS  Recent Results (from the past 24 hour(s))   ECG 12 Lead Dyspnea    Collection Time: 10/19/23  8:58 PM   Result Value Ref Range    QT Interval 355 ms    QTC Interval 454 ms   Urinalysis With Microscopic If Indicated (No Culture) - Urine, Clean Catch    Collection Time: 10/19/23  9:04 PM    Specimen: Urine, Clean Catch   Result Value Ref Range    Color, UA Yellow Yellow, Straw    Appearance, UA Clear Clear    pH, UA 5.5 5.0 - 8.0    Specific Gravity, UA 1.019 1.005 - 1.030    Glucose, UA Negative Negative    Ketones, UA Negative Negative    Bilirubin, UA Negative Negative    Blood, UA Negative Negative    Protein, UA Negative Negative    Leuk Esterase, UA Small (1+) (A) Negative    Nitrite, UA Negative Negative    Urobilinogen, UA 0.2  E.U./dL 0.2 - 1.0 E.U./dL   Urinalysis, Microscopic Only - Urine, Clean Catch    Collection Time: 10/19/23  9:04 PM    Specimen: Urine, Clean Catch   Result Value Ref Range    RBC, UA 6-10 (A) None Seen, 0-2 /HPF    WBC, UA Too Numerous to Count (A) None Seen, 0-2 /HPF    Bacteria, UA 3+ (A) None Seen /HPF    Squamous Epithelial Cells, UA 13-20 (A) None Seen, 0-2 /HPF    Hyaline Casts, UA None Seen None Seen /LPF    Methodology Manual Light Microscopy    Comprehensive Metabolic Panel    Collection Time: 10/19/23  9:23 PM    Specimen: Blood   Result Value Ref Range    Glucose 132 (H) 65 - 99 mg/dL    BUN 49 (H) 8 - 23 mg/dL    Creatinine 0.97 0.76 - 1.27 mg/dL    Sodium 142 136 - 145 mmol/L    Potassium 3.9 3.5 - 5.2 mmol/L    Chloride 111 (H) 98 - 107 mmol/L    CO2 25.0 22.0 - 29.0 mmol/L    Calcium 8.9 8.6 - 10.5 mg/dL    Total Protein 5.3 (L) 6.0 - 8.5 g/dL    Albumin 2.6 (L) 3.5 - 5.2 g/dL    ALT (SGPT) 12 1 - 41 U/L    AST (SGOT) 13 1 - 40 U/L    Alkaline Phosphatase 31 (L) 39 - 117 U/L    Total Bilirubin 0.2 0.0 - 1.2 mg/dL    Globulin 2.7 gm/dL    A/G Ratio 1.0 g/dL    BUN/Creatinine Ratio 50.5 (H) 7.0 - 25.0    Anion Gap 6.0 5.0 - 15.0 mmol/L    eGFR 86.1 >60.0 mL/min/1.73   BNP    Collection Time: 10/19/23  9:23 PM    Specimen: Blood   Result Value Ref Range    proBNP <36.0 0.0 - 900.0 pg/mL   D-dimer, Quantitative    Collection Time: 10/19/23  9:23 PM    Specimen: Blood   Result Value Ref Range    D-Dimer, Quantitative 0.48 0.00 - 0.66 MCGFEU/mL   High Sensitivity Troponin T    Collection Time: 10/19/23  9:23 PM    Specimen: Blood   Result Value Ref Range    HS Troponin T 28 (H) <15 ng/L   CBC Auto Differential    Collection Time: 10/19/23  9:23 PM    Specimen: Blood   Result Value Ref Range    WBC 11.73 (H) 3.40 - 10.80 10*3/mm3    RBC 2.06 (L) 4.14 - 5.80 10*6/mm3    Hemoglobin 5.6 (C) 13.0 - 17.7 g/dL    Hematocrit 18.3 (C) 37.5 - 51.0 %    MCV 88.8 79.0 - 97.0 fL    MCH 27.2 26.6 - 33.0 pg    MCHC 30.6  (L) 31.5 - 35.7 g/dL    RDW 15.4 12.3 - 15.4 %    RDW-SD 48.0 37.0 - 54.0 fl    MPV 11.0 6.0 - 12.0 fL    Platelets 196 140 - 450 10*3/mm3    Neutrophil % 65.9 42.7 - 76.0 %    Lymphocyte % 23.9 19.6 - 45.3 %    Monocyte % 7.6 5.0 - 12.0 %    Eosinophil % 1.3 0.3 - 6.2 %    Basophil % 0.4 0.0 - 1.5 %    Immature Grans % 0.9 (H) 0.0 - 0.5 %    Neutrophils, Absolute 7.74 (H) 1.70 - 7.00 10*3/mm3    Lymphocytes, Absolute 2.80 0.70 - 3.10 10*3/mm3    Monocytes, Absolute 0.89 0.10 - 0.90 10*3/mm3    Eosinophils, Absolute 0.15 0.00 - 0.40 10*3/mm3    Basophils, Absolute 0.05 0.00 - 0.20 10*3/mm3    Immature Grans, Absolute 0.10 (H) 0.00 - 0.05 10*3/mm3    nRBC 0.3 (H) 0.0 - 0.2 /100 WBC   Ferritin    Collection Time: 10/19/23  9:23 PM    Specimen: Blood   Result Value Ref Range    Ferritin 47.50 30.00 - 400.00 ng/mL   Haptoglobin    Collection Time: 10/19/23  9:23 PM    Specimen: Blood   Result Value Ref Range    Haptoglobin 105 30 - 200 mg/dL   Iron Profile    Collection Time: 10/19/23  9:23 PM    Specimen: Blood   Result Value Ref Range    Iron 44 (L) 59 - 158 mcg/dL    Iron Saturation (TSAT) 17 (L) 20 - 50 %    Transferrin 176 (L) 200 - 360 mg/dL    TIBC 262 (L) 298 - 536 mcg/dL   Lactate Dehydrogenase    Collection Time: 10/19/23  9:23 PM    Specimen: Blood   Result Value Ref Range     (L) 135 - 225 U/L   Reticulocytes    Collection Time: 10/19/23  9:23 PM    Specimen: Blood   Result Value Ref Range    Reticulocyte % 3.19 (H) 0.70 - 1.90 %    Reticulocyte Absolute 0.0638 0.0200 - 0.1300 10*6/mm3       Ordered the above labs and reviewed the results.        RADIOLOGY  XR Chest 1 View    Result Date: 10/19/2023  SINGLE VIEW OF THE CHEST  HISTORY: Shortness of breath  COMPARISON: None available.  FINDINGS: Heart size is within normal limits. No pneumothorax, pleural effusion, or acute infiltrate is seen. There are advanced degenerative changes involving the left shoulder.      No acute findings.  This report was  finalized on 10/19/2023 9:40 PM by Dr. Karuna Browne M.D on Workstation: [id number]       Ordered the above noted radiological studies. Reviewed by me in PACS.            PROCEDURES  Critical Care    Performed by: Abdiel Salas MD  Authorized by: Abdiel Salas MD    Critical care provider statement:     Critical care time (minutes):  40    Critical care time was exclusive of:  Separately billable procedures and treating other patients    Critical care was time spent personally by me on the following activities:  Pulse oximetry, ordering and review of radiographic studies, ordering and review of laboratory studies, ordering and performing treatments and interventions, discussions with consultants, evaluation of patient's response to treatment, examination of patient, review of old charts, re-evaluation of patient's condition, obtaining history from patient or surrogate and development of treatment plan with patient or surrogate                MEDICATIONS GIVEN IN ER  Medications   sodium chloride 0.9 % flush 10 mL (has no administration in time range)   sodium chloride 0.9 % bolus 1,000 mL (1,000 mL Intravenous New Bag 10/19/23 2206)   pantoprazole (PROTONIX) 40 mg in 100 mL NS (VTB) (has no administration in time range)   ketorolac (TORADOL) injection 15 mg (15 mg Intravenous Given 10/19/23 2206)   cefTRIAXone (ROCEPHIN) 2,000 mg in sodium chloride 0.9 % 100 mL IVPB-VTB (2,000 mg Intravenous New Bag 10/19/23 2206)   pantoprazole (PROTONIX) injection 80 mg (80 mg Intravenous Given 10/19/23 2231)                   MEDICAL DECISION MAKING, PROGRESS, and CONSULTS    All labs have been independently reviewed by me.  All radiology studies have been reviewed by me and I have also reviewed the radiology report.   EKG's independently viewed and interpreted by me.  Discussion below represents my analysis of pertinent findings related to patient's condition, differential diagnosis, treatment plan and  final disposition.      Additional sources:  - Discussed/ obtained information from independent historians: N/A    - External (non-ED) record review: Recent ID visit notes reviewed and summarized below    - Chronic or social conditions impacting care: HIV    - Shared decision making: Patient informed of plan for admission to ICU, blood transfusion      Orders placed during this visit:  Orders Placed This Encounter   Procedures    Critical Care    Blood Culture - Blood,    Blood Culture - Blood,    Urine Culture - Urine,    XR Chest 1 View    CT Abdomen Pelvis With Contrast    Comprehensive Metabolic Panel    Urinalysis With Microscopic If Indicated (No Culture) - Urine, Clean Catch    BNP    D-dimer, Quantitative    High Sensitivity Troponin T    CBC Auto Differential    Urinalysis, Microscopic Only - Urine, Clean Catch    Lactic Acid, Plasma    Vitamin B12    Folate    Ferritin    Haptoglobin    Iron Profile    Lactate Dehydrogenase    Reticulocytes    High Sensitivity Troponin T 2Hr    Pulse Oximetry, Continuous    Monitor Blood Pressure    Verify Informed Consent for Blood Product Administration    Gastroenterology (on-call MD unless specified)    Pulmonology (on-call MD unless specified)    POCT Occult Blood Stool    ECG 12 Lead Dyspnea    Type & Screen    Prepare RBC, 2 Units    Direct Antiglobulin Test (Direct Misty)    Insert Peripheral IV    Inpatient Admission    CBC & Differential           Differential diagnosis includes but is not limited to:    Sepsis  Pulmonary embolus  Upper GI bleed  Acute coronary syndrome        Independent interpretation of labs, radiology studies, and discussions with consultants:  ED Course as of 10/19/23 2251   Thu Oct 19, 2023   2106 BP(!): 89/55 [JR]   2106 Temp: 97.6 °F (36.4 °C) [JR]   2106 Heart Rate: 106 [JR]   2106 SpO2: 100 % [JR]   2106 EKG          EKG time: 2058  Rhythm/Rate: Sinus rhythm, 98  P waves and OR: Normal  QRS, axis: Normal axis  ST and T waves: No acute  ischemic changes    Interpreted Contemporaneously by me, independently viewed         [JR]   2126 Chest x-ray independently interpreted PACS.  Lung fields are clear bilaterally, no infiltrate, no pneumothorax, no pulmonary edema. [JR]   2130 WBC, UA(!): Too Numerous to Count [JR]   2140 Record review: I reviewed recent ED visit from 10/9/2023.  Patient was seen after a fall.  He was prescribed hydrocodone for pain. [JR]   2143 Record review: I reviewed office visit with infectious disease from 9/12/2023.  Patient was seen in follow-up for HIV and AIDS.  He has been intermittently compliant with antiretroviral therapy.  As of 9/12/2023 he had moved to [place] with his mother and stopped taking Biktarvy.  Biktarvy restarted during that visit.  He also developed severe left ocular shingles in September.  He has a history of rheumatoid arthritis as well. [JR]   2155 D-Dimer, Quant: 0.48 [JR]   2208 BUN(!): 49 [JR]   2208 Creatinine: 0.97 [JR]   2245 Discussed with Dr. Muller, pulmonary and critical care medicine, who agrees admit to ICU.  Discussed with Dr. Cortes, GI, who will consult.  He requested a pantoprazole drip. [JR]   2249 Dr. Muller requested that a CT of the abdomen pelvis to be added due to patient's history of colon cancer. [JR]   2251 Rectal exam performed with NELY Driscoll at bedside as well as LALI Vides.  There was gross melena present, heme positive.   passed. [JR]      ED Course User Index  [JR] Abdiel Salas MD           DIAGNOSIS  Final diagnoses:   Acute upper GI bleed   Hypotension due to hypovolemia   Systolic murmur   Acute UTI   HIV infection, unspecified symptom status         DISPOSITION  Admit to ICU            Latest Documented Vital Signs:  As of 22:51 EDT  BP- 120/71 HR- 101 Temp- 97.6 °F (36.4 °C) (Oral) O2 sat- 100%              --    Please note that portions of this were completed with a voice recognition program.       Note Disclaimer: At [facility], we  believe that sharing information builds trust and better relationships. You are receiving this note because you are receiving care at [facility] or recently visited. It is possible you will see health information before a provider has talked with you about it. This kind of information can be easy to misunderstand. To help you fully understand what it means for your health, we urge you to discuss this note with your provider.             Abdiel Salas MD  10/19/23 2248       Abdiel Salas MD  10/19/23 2250       Abdiel Salas MD  10/19/23 2251      Electronically signed by Abdiel Salas MD at 10/19/23 2251       Vicky Cuello, RN at 10/19/23 2045          Pt to ED via EMS from home for SOA x 2-3days, also endorses frequent urination an frequent falls.   EMS reports that patient is hypotensive but were unsuccessful in getting an IV    Electronically signed by Vicky Cuello, RN at 10/19/23 2046       Vital Signs (last day)       Date/Time Temp Temp src Pulse Resp BP Patient Position SpO2    10/20/23 1000 -- -- 95 17 130/63 Lying 100    10/20/23 0900 -- -- 96 16 131/54 Lying 100    10/20/23 0820 97.8 (36.6) Oral -- -- -- -- --    10/20/23 0800 -- -- 90 18 132/63 Lying 100    10/20/23 0700 -- -- 89 -- 119/63 -- 100    10/20/23 0630 97.9 (36.6) Oral 86 16 109/62 -- 100    10/20/23 0615 -- -- -- -- -- -- 98    10/20/23 0600 -- -- 89 16 116/61 Lying 100    10/20/23 0530 -- -- 88 -- 117/70 -- 100    10/20/23 0510 -- -- 88 -- 116/54 -- 100    10/20/23 0410 -- -- 93 -- 130/64 -- 100    10/20/23 0407 98.2 (36.8) Oral 92 16 130/64 -- 100    10/20/23 0400 -- -- 93 16 115/57 Lying 99    10/20/23 0352 97.9 (36.6) -- 89 20 101/61 -- 100    10/20/23 0350 -- -- 90 -- 101/61 -- 100    10/20/23 0335 97.9 (36.6) Oral 86 20 130/79 -- 100    10/20/23 0330 -- -- 92 -- 92/72 -- 100    10/20/23 0230 -- -- 90 -- 111/55 -- 100    10/20/23 0200 -- -- 93 17 103/54 Lying 100     10/20/23 0130 -- -- 98 -- 110/52 -- 100    10/20/23 0100 -- -- 97 15 121/59 -- 100    10/20/23 0055 -- -- 101 -- 121/59 -- 100    10/20/23 0054 98 (36.7) Oral 95 16 121/59 -- 99    10/20/23 0039 98 (36.7) Oral 105 16 109/95 -- 100    10/20/23 0025 98 (36.7) Oral 100 16 116/58 Lying 100    10/19/23 2355 98 (36.7) Oral 105 16 125/70 Lying 99    10/19/23 2354 97.8 (36.6) Oral 106 16 125/70 Lying 99    10/19/23 2333 -- -- 97 18 127/64 -- 100    10/19/23 2328 -- -- 96 -- -- -- 100    10/19/23 2323 -- -- 98 -- -- -- 100    10/19/23 2318 -- -- 95 -- -- -- 100    10/19/23 2303 -- -- 99 -- 118/59 -- 100    10/19/23 2258 -- -- 98 -- -- -- 100    10/19/23 2253 -- -- 98 -- -- -- 100    10/19/23 2251 -- -- 101 -- -- -- 100    10/19/23 2248 -- -- 108 -- -- -- 100    10/19/23 2246 -- -- 105 -- -- -- 100    10/19/23 2243 -- -- 99 -- -- -- 100    10/19/23 2241 -- -- 101 -- -- -- 100    10/19/23 2238 -- -- 102 -- -- -- 98    10/19/23 2236 -- -- 98 -- -- -- 100    10/19/23 2233 -- -- 97 -- -- -- 100    10/19/23 2231 -- -- 98 -- 120/71 -- 96    10/19/23 2228 -- -- 99 -- -- -- 99    10/19/23 2226 -- -- 98 -- -- -- 100    10/19/23 2223 -- -- 97 -- -- -- 100    10/19/23 2221 -- -- 94 -- -- -- 100    10/19/23 2218 -- -- 101 -- -- -- 100    10/19/23 2216 -- -- 108 -- -- -- 100    10/19/23 2213 -- -- 101 -- -- -- 100    10/19/23 2211 -- -- 100 -- -- -- 100    10/19/23 2208 -- -- 103 -- -- -- 100    10/19/23 2206 -- -- 100 -- -- -- 100    10/19/23 2203 -- -- 108 -- -- -- 100    10/19/23 2201 -- -- 102 -- 107/70 -- 100    10/19/23 2158 -- -- 112 -- -- -- 100    10/19/23 2156 -- -- 101 -- -- -- 100    10/19/23 2153 -- -- 112 -- -- -- 100    10/19/23 2151 -- -- 105 -- -- -- 100    10/19/23 2148 -- -- 101 -- -- -- 100    10/19/23 2146 -- -- 103 -- -- -- 100    10/19/23 2143 -- -- 105 -- -- -- 100    10/19/23 2141 -- -- 97 -- -- -- 98    10/19/23 2138 -- -- 101 -- -- -- 95    10/19/23 2136 -- -- 96 -- -- -- 99    10/19/23 2133 -- -- 104 -- -- --  99    10/19/23 2131 -- -- 98 -- 87/55 -- 100    10/19/23 2128 -- -- 100 -- -- -- 100    10/19/23 2126 -- -- 101 -- -- -- 100    10/19/23 2123 -- -- 98 -- -- -- 100    10/19/23 2121 -- -- 105 -- -- -- 100    10/19/23 2118 -- -- 103 -- -- -- 89    10/19/23 2116 -- -- 98 -- -- -- 96    10/19/23 2113 -- -- 109 -- -- -- 100    10/19/23 2111 -- -- 103 -- -- -- 99    10/19/23 2108 -- -- 104 -- -- -- 97    10/19/23 2106 -- -- 103 -- -- -- 97    10/19/23 2103 -- -- 117 -- -- -- 97    10/19/23 2101 -- -- 101 -- 100/64 -- 98    10/19/23 2058 -- -- 99 -- -- -- 93    10/19/23 2056 -- -- 96 -- -- -- 99    10/19/23 2046 97.6 (36.4) Oral 106 18 89/55 Sitting 100          Oxygen Therapy (last day)       Date/Time SpO2 Device (Oxygen Therapy) Flow (L/min) Oxygen Concentration (%) ETCO2 (mmHg)    10/20/23 1000 100 room air -- -- --    10/20/23 0900 100 room air -- -- --    10/20/23 0800 100 room air -- -- --    10/20/23 0700 100 -- -- -- --    10/20/23 0630 100 room air -- -- --    10/20/23 0615 98 -- -- -- --    10/20/23 0600 100 room air -- -- --    10/20/23 0530 100 -- -- -- --    10/20/23 0510 100 -- -- -- --    10/20/23 0410 100 -- -- -- --    10/20/23 0407 100 room air -- -- --    10/20/23 0400 99 room air -- -- --    10/20/23 0352 100 -- -- -- --    10/20/23 0350 100 -- -- -- --    10/20/23 0335 100 -- -- -- --    10/20/23 0330 100 -- -- -- --    10/20/23 0230 100 -- -- -- --    10/20/23 0200 100 room air -- -- --    10/20/23 0130 100 -- -- -- --    10/20/23 0100 100 room air -- -- --    10/20/23 0055 100 -- -- -- --    10/20/23 0054 99 room air -- -- --    10/20/23 0039 100 room air -- -- --    10/20/23 0025 100 room air -- -- --    10/20/23 0000 -- room air -- -- --    10/19/23 2355 99 room air -- -- --    10/19/23 2354 99 room air -- -- --    10/19/23 2333 100 -- -- -- --    10/19/23 2328 100 -- -- -- --    10/19/23 2323 100 -- -- -- --    10/19/23 2318 100 -- -- -- --    10/19/23 2303 100 -- -- -- --    10/19/23 2258 100 --  -- -- --    10/19/23 2253 100 -- -- -- --    10/19/23 2251 100 -- -- -- --    10/19/23 2248 100 -- -- -- --    10/19/23 2246 100 -- -- -- --    10/19/23 2243 100 -- -- -- --    10/19/23 2241 100 -- -- -- --    10/19/23 2238 98 -- -- -- --    10/19/23 2236 100 -- -- -- --    10/19/23 2233 100 -- -- -- --    10/19/23 2231 96 -- -- -- --    10/19/23 2228 99 -- -- -- --    10/19/23 2226 100 -- -- -- --    10/19/23 2223 100 -- -- -- --    10/19/23 2221 100 -- -- -- --    10/19/23 2218 100 -- -- -- --    10/19/23 2216 100 -- -- -- --    10/19/23 2213 100 -- -- -- --    10/19/23 2211 100 -- -- -- --    10/19/23 2208 100 -- -- -- --    10/19/23 2206 100 -- -- -- --    10/19/23 2203 100 -- -- -- --    10/19/23 2201 100 -- -- -- --    10/19/23 2158 100 -- -- -- --    10/19/23 2156 100 -- -- -- --    10/19/23 2153 100 -- -- -- --    10/19/23 2151 100 -- -- -- --    10/19/23 2148 100 -- -- -- --    10/19/23 2146 100 -- -- -- --    10/19/23 2143 100 -- -- -- --    10/19/23 2141 98 -- -- -- --    10/19/23 2138 95 -- -- -- --    10/19/23 2136 99 -- -- -- --    10/19/23 2133 99 -- -- -- --    10/19/23 2131 100 -- -- -- --    10/19/23 2128 100 -- -- -- --    10/19/23 2126 100 -- -- -- --    10/19/23 2123 100 -- -- -- --    10/19/23 2121 100 -- -- -- --    10/19/23 2118 89 -- -- -- --    10/19/23 2116 96 -- -- -- --    10/19/23 2113 100 -- -- -- --    10/19/23 2111 99 -- -- -- --    10/19/23 2108 97 -- -- -- --    10/19/23 2106 97 -- -- -- --    10/19/23 2103 97 -- -- -- --    10/19/23 2101 98 -- -- -- --    10/19/23 2058 93 -- -- -- --    10/19/23 2056 99 -- -- -- --    10/19/23 2046 100 room air -- -- --          Lines, Drains & Airways       Active LDAs       Name Placement date Placement time Site Days    Peripheral IV Right;Posterior Forearm --  --  Forearm  --    Peripheral IV 10/19/23 2231 Left;Anterior Forearm 10/19/23  2231  Forearm  less than 1              Inactive LDAs       None                  Facility-Administered  Medications as of 10/20/2023   Medication Dose Route Frequency Provider Last Rate Last Admin    [COMPLETED] cefTRIAXone (ROCEPHIN) 2,000 mg in sodium chloride 0.9 % 100 mL IVPB-VTB  2,000 mg Intravenous Once Abdiel Salas MD   Stopped at 10/19/23 2236    dextrose (D50W) (25 g/50 mL) IV injection 25 g  25 g Intravenous Q15 Min PRN Kennedy Muller MD        dextrose (GLUTOSE) oral gel 15 g  15 g Oral Q15 Min PRN Kennedy Muller MD        glucagon (GLUCAGEN) injection 1 mg  1 mg Intramuscular Q15 Min PRN Kennedy Muller MD        insulin regular (humuLIN R,novoLIN R) injection 2-9 Units  2-9 Units Subcutaneous Q6H Kennedy Muller MD        [COMPLETED] iopamidol (ISOVUE-300) 61 % injection 100 mL  100 mL Intravenous Once in imaging Kennedy Muller MD   85 mL at 10/19/23 2311    [COMPLETED] ketorolac (TORADOL) injection 15 mg  15 mg Intravenous Once Abdiel aSlas MD   15 mg at 10/19/23 2206    methylPREDNISolone sodium succinate (SOLU-Medrol) injection 20 mg  20 mg Intravenous Daily Kennedy Muller MD   20 mg at 10/20/23 0821    morphine injection 2 mg  2 mg Intravenous Q4H PRN Kennedy Muller MD   2 mg at 10/20/23 0459    pantoprazole (PROTONIX) 40 mg in 100 mL NS (VTB)  8 mg/hr Intravenous Continuous Abdiel Salas MD 20 mL/hr at 10/20/23 0824 8 mg/hr at 10/20/23 0824    [COMPLETED] pantoprazole (PROTONIX) injection 80 mg  80 mg Intravenous Once Abdiel Salas MD   80 mg at 10/19/23 2231    [COMPLETED] sodium chloride 0.9 % bolus 1,000 mL  1,000 mL Intravenous Once Abdiel Salas MD   Stopped at 10/19/23 2306    sodium chloride 0.9 % flush 10 mL  10 mL Intravenous PRN Abdiel Salas MD         Orders (last 24 hrs)        Start     Ordered    10/21/23 0600  Renal Function Panel  Daily       10/20/23 0013    10/21/23 0000  CBC (No Diff)  Every 6 Hours       10/20/23 0013    10/20/23 2000  CBC (No Diff)  Every 12 Hours      Comments: CBC every 12 hours      10/20/23 0817    10/20/23 1004  CBC  & Differential  STAT         10/20/23 1004    10/20/23 1004  CBC Auto Differential  PROCEDURE ONCE         10/20/23 1004    10/20/23 1000  NPO Diet NPO Type: Strict NPO  Diet Effective Now         10/20/23 0747    10/20/23 0900  methylPREDNISolone sodium succinate (SOLU-Medrol) injection 20 mg  Daily         10/20/23 0013    10/20/23 0818  Transfuse 2 units of packed red blood cells for hemoglobin less than 7.0  Nursing Communication  Until Discontinued        Comments: Transfuse 2 units of packed red blood cells for hemoglobin less than 7.0    10/20/23 0817    10/20/23 0818  Consent for EGD  Nursing Communication  Until Discontinued        Comments: Consent for EGD    10/20/23 0817    10/20/23 0817  Case Request  Once         10/20/23 0817    10/20/23 0746  Diet: Liquid Diets; Clear Liquid; Fluid Consistency: Thin (IDDSI 0)  Diet Effective Now,   Status:  Canceled         10/20/23 0746    10/20/23 0702  Inpatient Gastroenterology Consult  [place]         Specialty:  Gastroenterology  Provider:  Vishal Sena MD    10/20/23 0013    10/20/23 0620  STAT Lactic Acid, Reflex  PROCEDURE ONCE         10/20/23 0357    10/20/23 0610  POC Glucose Once  PROCEDURE ONCE        Comments: Complete no more than 45 minutes prior to patient eating      10/20/23 0608    10/20/23 0600  POC Glucose Q6H  Every 6 Hours      Comments: Complete no more than 45 minutes prior to patient eating      10/20/23 0013    10/20/23 0600  HIV-1 RNA B-DNA Quant  Morning Draw         10/20/23 0013    10/20/23 0600  Magnesium  Morning Draw         10/20/23 0013    10/20/23 0128  Initiate & Follow Hypercapnic Monitoring Guideline for Opioid Administration via EtCO2 and / or SpO2  Continuous        Comments: Follow Hypercapnic Monitoring Guideline As Outlined in Process Instructions (Open Order Report to View Full Instructions)    10/20/23 0127    10/20/23 0128  Opioid Administration - Document EtCO2 and / or SpO2 With Each Set of Vitals & Any Change  in Patient Status  Per Order Details        Comments: With Each Set of Vitals & Any Change in Patient Status    10/20/23 0127    10/20/23 0128  Opioid Administration - Notify Provider Hypercapnic Monitoring  Until Discontinued         10/20/23 0127    10/20/23 0128  Opioid Administration - Continuous Pulse Oximetry (SpO2)  Continuous         10/20/23 0127    10/20/23 0111  morphine injection 2 mg  Every 4 Hours PRN         10/20/23 0111    10/20/23 0105  STAT Lactic Acid, Reflex  PROCEDURE ONCE         10/19/23 2309    10/20/23 0100  insulin regular (humuLIN R,novoLIN R) injection 2-9 Units  Every 6 Hours Scheduled         10/20/23 0013    10/20/23 0012  Place Sequential Compression Device  Once         10/20/23 0013    10/20/23 0012  Maintain Sequential Compression Device  Continuous         10/20/23 0013    10/20/23 0011  NPO Diet NPO Type: Strict NPO  Diet Effective Now,   Status:  Canceled         10/20/23 0013    10/20/23 0011  Adult Transthoracic Echo 3D Complete W/ Cont If Necessary Per Protocol  Once         10/20/23 0013    10/20/23 0010  dextrose (GLUTOSE) oral gel 15 g  Every 15 Minutes PRN         10/20/23 0013    10/20/23 0010  dextrose (D50W) (25 g/50 mL) IV injection 25 g  Every 15 Minutes PRN         10/20/23 0013    10/20/23 0010  glucagon (GLUCAGEN) injection 1 mg  Every 15 Minutes PRN         10/20/23 0013    10/20/23 0001  POC Glucose Once  PROCEDURE ONCE        Comments: Complete no more than 45 minutes prior to patient eating      10/19/23 2359    10/19/23 2326  iopamidol (ISOVUE-300) 61 % injection 100 mL  Once in Imaging         10/19/23 2310    10/19/23 2323  High Sensitivity Troponin T 2Hr  PROCEDURE ONCE         10/19/23 2158    10/19/23 2300  pantoprazole (PROTONIX) 40 mg in 100 mL NS (VTB)  Continuous         10/19/23 2244    10/19/23 2251  CT Abdomen Pelvis With Contrast  1 Time Imaging        Comments: IV CONTRAST ONLY      10/19/23 2250    10/19/23 2248  Critical Care  Once         Comments: This order was created via procedure documentation    10/19/23 2247    10/19/23 2247  Inpatient Admission  Once         10/19/23 2246    10/19/23 2241  pantoprazole (PROTONIX) injection 80 mg  Once         10/19/23 2225    10/19/23 2226  Gastroenterology (on-call MD unless specified)  Once        Specialty:  Gastroenterology  Provider:  Vishal Sena MD    10/19/23 2225    10/19/23 2226  Pulmonology (on-call MD unless specified)  Once        Specialty:  Pulmonary Disease  Provider:  Kennedy Muller MD    10/19/23 2225    10/19/23 2209  POCT Occult Blood Stool  Once         10/19/23 2208    10/19/23 2201  Urine Culture - Urine, Urine, Clean Catch  Once         10/19/23 2200    10/19/23 2156  Direct Antiglobulin Test (Direct Misty)  STAT         10/19/23 2155    10/19/23 2156  Folate  STAT         10/19/23 2155    10/19/23 2156  Ferritin  STAT         10/19/23 2155    10/19/23 2156  Haptoglobin  STAT         10/19/23 2155    10/19/23 2156  Iron Profile  STAT         10/19/23 2155    10/19/23 2156  Lactate Dehydrogenase  STAT         10/19/23 2155    10/19/23 2156  Reticulocytes  STAT         10/19/23 2155    10/19/23 2155  cefTRIAXone (ROCEPHIN) 2,000 mg in sodium chloride 0.9 % 100 mL IVPB-VTB  Once         10/19/23 2139    10/19/23 2155  Type & Screen  Once         10/19/23 2154    10/19/23 2155  Verify Informed Consent for Blood Product Administration  Once         10/19/23 2154    10/19/23 2155  Prepare RBC, 2 Units  Blood - Once         10/19/23 2154    10/19/23 2155  Vitamin B12  STAT         10/19/23 2155    10/19/23 2154  Transfuse RBC, 2 Units Infuse Each Unit Over: 3.5H  Transfusion       10/19/23 2154    10/19/23 2154  ketorolac (TORADOL) injection 15 mg  Once         10/19/23 2138    10/19/23 2154  sodium chloride 0.9 % bolus 1,000 mL  Once         10/19/23 2138    10/19/23 2140  Blood Culture - Blood, Arm, Left  Once         10/19/23 2139    10/19/23 2140  Blood Culture - Blood, Arm, Left   Once         10/19/23 2139    10/19/23 2140  Lactic Acid, Plasma  Once         10/19/23 2139    10/19/23 2139  Urinalysis With Culture If Indicated - Urine, Clean Catch  Once,   Status:  Canceled         10/19/23 2139    10/19/23 2122  Urinalysis, Microscopic Only - Urine, Clean Catch  Once         10/19/23 2121    10/19/23 2054  CBC & Differential  Once         10/19/23 2054    10/19/23 2054  Comprehensive Metabolic Panel  Once         10/19/23 2054    10/19/23 2054  Urinalysis With Microscopic If Indicated (No Culture) - Urine, Clean Catch  Once         10/19/23 2054    10/19/23 2054  BNP  Once         10/19/23 2054    10/19/23 2054  D-dimer, Quantitative  Once         10/19/23 2054    10/19/23 2054  High Sensitivity Troponin T  Once         10/19/23 2054    10/19/23 2054  ECG 12 Lead Dyspnea  Once         10/19/23 2054    10/19/23 2054  Insert Peripheral IV  Once        See Hyperspace for full Linked Orders Report.    10/19/23 2054    10/19/23 2054  Cardiac Monitoring  Continuous        Comments: Follow Standing Orders As Outlined in Process Instructions (Open Order Report to View Full Instructions)    10/19/23 2054    10/19/23 2054  Pulse Oximetry, Continuous  Continuous,   Status:  Canceled         10/19/23 2054    10/19/23 2054  Monitor Blood Pressure  Per Hospital Policy         10/19/23 2054    10/19/23 2054  XR Chest 1 View  1 Time Imaging         10/19/23 2054    10/19/23 2054  CBC Auto Differential  PROCEDURE ONCE         10/19/23 2054    10/19/23 2053  sodium chloride 0.9 % flush 10 mL  As Needed        See Hyperspace for full Linked Orders Report.    10/19/23 2054    Unscheduled  Follow Hypoglycemia Standing Orders For Blood Glucose <70 & Notify Provider of Treatment  As Needed      Comments: Follow Hypoglycemia Orders As Outlined in Process Instructions (Open Order Report to View Full Instructions)  Notify Provider Any Time Hypoglycemia Treatment is Administered    10/20/23 0013    Signed and Held   "Implement Anesthesia orders day of procedure.  Once         Signed and Held    Signed and Held  Obtain informed consent  Once         Signed and Held                     Physician Progress Notes (last 24 hours)        Linnea Vega MD at 10/20/23 0853                [facility]         Dr Linnea Vega   LOS: 1 day   Patient Care Team:  Provider, No Known as PCP - Gold Harrison MD (Internal Medicine)    Chief Complaint: Acute GI bleed with acute blood loss anemia hypotension on admission multiple medical issues    Interval History: Resting comfortably no signs of overt bleeding reported.  Protonix drip ongoing.    REVIEW OF SYSTEMS:   CARDIOVASCULAR: No chest pain, chest pressure or chest discomfort. No palpitations or edema.   RESPIRATORY: No shortness of breath, cough or sputum.   GASTROINTESTINAL: No anorexia, nausea, vomiting or diarrhea. No abdominal pain or blood.   HEMATOLOGIC: No bleeding or bruising.     Ventilator/Non-Invasive Ventilation Settings (From admission, onward)      None              Vital Signs  Temp:  [97.6 °F (36.4 °C)-98.2 °F (36.8 °C)] 97.8 °F (36.6 °C)  Heart Rate:  [] 90  Resp:  [15-20] 18  BP: ()/(52-95) 132/63    Intake/Output Summary (Last 24 hours) at 10/20/2023 0853  Last data filed at 10/20/2023 0800  Gross per 24 hour   Intake 2691 ml   Output 1250 ml   Net 1441 ml     Flowsheet Rows      Flowsheet Row First Filed Value   Admission Height 188 cm (74\") Documented at 10/19/2023 2046   Admission Weight 79.4 kg (175 lb) Documented at 10/19/2023 2046            Physical Exam:  Patient is examined using the personal protective equipment as per guidelines from infection control for this particular patient as enacted.  Hand hygiene was performed before and after patient interaction.   General Appearance:    Alert, cooperative, in no acute distress.  Following simple commands  ENT Mallampati between 3 and 4 no nasal congestion  Neck midline " trachea, no thyromegaly   Lungs:     Clear to auscultation, respirations regular, even and                  unlabored    Heart:    Regular rhythm and normal rate, normal S1 and S2, no            murmur, no gallop, no rub, no click   Chest Wall:    No abnormalities observed   Abdomen:     Normal bowel sounds, no masses, no organomegaly, soft        nontender, nondistended, no guarding, no rebound                tenderness   Extremities:   Moves all extremities well, no edema, no cyanosis, no             redness  CNS no focal neurological deficits normal sensory exam  Skin no rashes no nodules  Musculoskeletal no cyanosis no clubbing normal range of motion     Results Review:        Results from last 7 days   Lab Units 10/19/23  2123   SODIUM mmol/L 142   POTASSIUM mmol/L 3.9   CHLORIDE mmol/L 111*   CO2 mmol/L 25.0   BUN mg/dL 49*   CREATININE mg/dL 0.97   GLUCOSE mg/dL 132*   CALCIUM mg/dL 8.9     Results from last 7 days   Lab Units 10/19/23  2341 10/19/23  2123   HSTROP T ng/L 25* 28*     Results from last 7 days   Lab Units 10/19/23  2123   WBC 10*3/mm3 11.73*   HEMOGLOBIN g/dL 5.6*   HEMATOCRIT % 18.3*   PLATELETS 10*3/mm3 196             Results from last 7 days   Lab Units 10/20/23  0320   MAGNESIUM mg/dL 1.7               I reviewed the patient's new clinical results.  I personally viewed and interpreted the patient's chest x-ray.        Medication Review:   insulin regular, 2-9 Units, Subcutaneous, Q6H  methylPREDNISolone sodium succinate, 20 mg, Intravenous, Daily        pantoprazole, 8 mg/hr, Last Rate: 8 mg/hr (10/20/23 0824)        ASSESSMENT:   Acute blood loss anemia  GI bleed  Hypotension on admission  Recent use of NSAIDs for shingles pain  History colon cancer 2012  HIV disease on HAART  Ocular shingles  History MI ?  Cardiac murmur  RA on chronic prednisone  Diabetes mellitus type 2    PLAN:  Awaiting repeat hemoglobin posttransfusion.  2 unit PRBC ordered post hemoglobin 5.6.  Discussed with GI  plans for EGD today later  Hemodynamically stable at this time  Protonix drip to continue per GI  Blood pressure stable  Blood glucose monitoring per ICU protocol  ICU core measures        Linnea Vega MD  10/20/23  08:53 EDT            Electronically signed by Linnea Vega MD at 10/20/23 0856          Consult Notes (last 24 hours)        Guero Lake MD at 10/20/23 0818        Consult Orders    1. Gastroenterology (on-call MD unless specified) [[id number]] ordered by Abdiel Salas MD at 10/19/23 7220                 [facility]  Initial Inpatient Consult Note    Referring Provider: Dr. Muller    Reason for Consultation: GI bleed    Subjective     History of present illness:    68 y.o. male HIV positive, admitted with shortness of air, hemodynamic instability through the emergency room with melena x2 to 3 days.  Remote history of peptic ulcer disease.  Does take NSAIDs.  Does not take PPI at home.  Denies abdominal pain.  He was resuscitated with colloid and crystalloid in the emergency room and he is brought to the CCU.  This morning he denies shortness of air or chest pain he is hemodynamically stable.  He has had no melena today.    Past Medical History:  Past Medical History:   Diagnosis Date    Arthritis     Colon cancer     Diabetes mellitus      Past Surgical History:  Past Surgical History:   Procedure Laterality Date    FEMUR SURGERY      JOINT REPLACEMENT        Social History:   Social History     Tobacco Use    Smoking status: Never     Passive exposure: Never    Smokeless tobacco: Not on file   Substance Use Topics    Alcohol use: Not Currently     Comment: SELDOM      Family History:  Family History   Problem Relation Age of Onset    No Known Problems Mother     Diabetes Father        Home Meds:  Medications Prior to Admission   Medication Sig Dispense Refill Last Dose    aspirin 81 MG EC tablet Take 81 mg by mouth daily.       dicyclomine (BENTYL) 10 MG capsule  Take 1 capsule by mouth 3 (Three) Times a Day As Needed (abdominal pain or cramping). 20 capsule 0     famotidine (PEPCID) 20 MG tablet Take 1 tablet by mouth 2 (Two) Times a Day. 20 tablet 0     HYDROcodone-acetaminophen (NORCO) 5-325 MG per tablet Take 1 tablet by mouth Every 12 (Twelve) Hours As Needed for Moderate Pain for up to 8 doses. 8 tablet 0     ibuprofen (ADVIL,MOTRIN) 800 MG tablet Take 1 tablet by mouth 3 (Three) Times a Day With Meals. 90 tablet 0     Ibuprofen 200 MG capsule Take  by mouth.       metFORMIN (GLUCOPHAGE) 1000 MG tablet Take 1,000 mg by mouth 2 (two) times a day with meals.       ondansetron (ZOFRAN) 4 MG tablet Take 1 tablet by mouth Every 4 (Four) Hours As Needed for Nausea or Vomiting. 20 tablet 0     predniSONE (DELTASONE) 10 MG tablet Take 10 mg by mouth daily.       valACYclovir (VALTREX) 1000 MG tablet Take 2 tablets now and repeat one time in 12 hours 4 tablet 0      Current Meds:   insulin regular, 2-9 Units, Subcutaneous, Q6H  methylPREDNISolone sodium succinate, 20 mg, Intravenous, Daily      Allergies:  Allergies   Allergen Reactions    Tree Nut Anaphylaxis    Penicillins Swelling    Sulfa Antibiotics Dizziness     Not certain.  Was concerned about dermatitis of legs.     Review of Systems  There is weakness and fatigue all other systems reviewed and negative     Objective     Vital Signs  Temp:  [97.6 °F (36.4 °C)-98.2 °F (36.8 °C)] 97.9 °F (36.6 °C)  Heart Rate:  [] 90  Resp:  [15-20] 18  BP: ()/(52-95) 132/63  Physical Exam:  General Appearance:    Alert, cooperative, in no acute distress   Head:    Normocephalic, without obvious abnormality, atraumatic   Eyes:          conjunctivae and sclerae normal, no   icterus   Throat:   no thrush, oral mucosa moist   Neck:   Supple, no adenopathy   Lungs:     Clear to auscultation bilaterally    Heart:    Regular rhythm and normal rate    Chest Wall:    No abnormalities observed   Abdomen:     Soft, nondistended,  "nontender; normal bowel sounds   Extremities:   no edema, no redness   Skin:   No bruising or rash   Psychiatric:  normal mood and insight     Results Review:   I reviewed the patient's new clinical results.    Results from last 7 days   Lab Units 10/19/23  2123   WBC 10*3/mm3 11.73*   HEMOGLOBIN g/dL 5.6*   HEMATOCRIT % 18.3*   PLATELETS 10*3/mm3 196     Results from last 7 days   Lab Units 10/19/23  2123   SODIUM mmol/L 142   POTASSIUM mmol/L 3.9   CHLORIDE mmol/L 111*   CO2 mmol/L 25.0   BUN mg/dL 49*   CREATININE mg/dL 0.97   CALCIUM mg/dL 8.9   BILIRUBIN mg/dL 0.2   ALK PHOS U/L 31*   ALT (SGPT) U/L 12   AST (SGOT) U/L 13   GLUCOSE mg/dL 132*         No results found for: \"LIPASE\"    Radiology:  CT Abdomen Pelvis With Contrast   Final Result       1. Slightly thick-walled appearance to the second portion of the   duodenum and proximal third portion of the duodenum. Duodenitis is not   completely excluded.   2. Mild colonic diverticulosis.       Radiation dose reduction techniques were utilized, including automated   exposure control and exposure modulation based on body size.           This report was finalized on 10/19/2023 11:50 PM by Dr. Karuna Browne M.D on Workstation: [id number]          XR Chest 1 View   Final Result         Electronically signed by Andres Monaco MD on 10-19-23 at 2301          Assessment & Plan   Active Hospital Problems    Diagnosis     **Acute upper GI bleed        Assessment:  Melena/GI bleed  At first hemodynamically unstable, but has been resuscitated with crystalloid and colloid and is stable  Abnormal imaging see above possible duodenal ulcer  Anemia  remote history of peptic ulcer disease    Plan:  IV Protonix  Serial hemoglobin  Transfusion parameters written  Okay for clear liquids for 2 more hours then strict n.p.o. for likely EGD in 8 hours  If he becomes hemodynamically unstable we may move EGD sooner  An EGD  Today  Further recommendations after EGD is " complete      I discussed the patients findings and my recommendations with patient and nursing staff.    Guero Lake MD             Electronically signed by Guero Lake MD at 10/20/23 9216

## 2023-10-20 NOTE — CASE MANAGEMENT/SOCIAL WORK
Discharge Planning Assessment  [facility]     Patient Name: Beth Mckinney  MRN: [id number]  Today's Date: 10/20/2023    Admit Date: 10/19/2023    Plan: Home w/o needs   Discharge Needs Assessment       Row Name 10/20/23 3697       Living Environment    People in Home parent(s);sibling(s)    Name(s) of People in Home sister: Shashi Mckinney & 94 year old mother that he and his sisters take care of    Current Living Arrangements home    Potentially Unsafe Housing Conditions none       Resource/Environmental Concerns    Resource/Environmental Concerns none    Transportation Concerns none       Transition Planning    Patient/Family Anticipates Transition to home with family    Patient/Family Anticipated Services at Transition none    Transportation Anticipated family or friend will provide       Discharge Needs Assessment    Readmission Within the Last 30 Days no previous admission in last 30 days    Equipment Currently Used at Home none    Concerns to be Addressed denies needs/concerns at this time;adjustment to diagnosis/illness;basic needs    Anticipated Changes Related to Illness none    Equipment Needed After Discharge none                   Discharge Plan       Row Name 10/20/23 8118       Plan    Plan Comments Pt confirmed the address and [facility] are correct. Pt said he lives in a single level home, he siad his sister Shashi Mckinney and his 94 yr old mother live together, pt said he does not have a PCP but would like a black male MD if possible, I called Dr Nirav Donovan office, he was taking new patients and an appointment was made for Nov 2 at 1115. Pt said he drives and has transportation, uses no DME and plans to return home and does not anticipate any needs for himself but he ask for a sitter list for him to use with his mother (provided), he agreed his mother's spouse, his father was active  in the [place] War, I encouraged him to apply for [facility] Aide and Attendant for his mother,  Lj (she said she is a ) look up where to apply on line while I was in the room.  Pt denies other needs.   Malinda Farfan RN      Row Name 10/20/23 7392       Plan    Plan Home w/o needs    Patient/Family in Agreement with Plan yes    Provided Post Acute Provider List? Yes    Post Acute Provider List Home Health;Nursing Home    Delivered To Support Person    Support Person pt's sister Petra Whitfield    Method of Delivery In person                  Continued Care and Services - Admitted Since 10/19/2023    Coordination has not been started for this encounter.          Demographic Summary       Row Name 10/20/23 0929       General Information    Admission Type inpatient    Arrived From home    Required Notices Provided Important Message from Medicare    Referral Source admission list    Reason for Consult discharge planning    Preferred Language English       Contact Information    Permission Granted to Share Info With family/designee                   Functional Status       Row Name 10/20/23 5066       Functional Status, IADL    Medications independent    Meal Preparation independent    Housekeeping independent    Laundry independent    Shopping independent                   Psychosocial    No documentation.            Malinda Farfan, RN

## 2023-10-20 NOTE — ANESTHESIA PREPROCEDURE EVALUATION
Anesthesia Evaluation     NPO Solid Status: > 8 hours             Airway   Mallampati: I  Dental      Pulmonary    (+) a smoker,  (-) sleep apnea    ROS comment: Negative patient screen for RAJIV    Cardiovascular     (-) hypertension      Neuro/Psych  GI/Hepatic/Renal/Endo    (+) GI bleeding , diabetes mellitus type 2    Musculoskeletal     Abdominal    Substance History      OB/GYN          Other                    Anesthesia Plan    ASA 3 - emergent     MAC       Anesthetic plan, risks, benefits, and alternatives have been provided, discussed and informed consent has been obtained with: patient.    CODE STATUS:

## 2023-10-20 NOTE — PLAN OF CARE
Goal Outcome Evaluation:  Admitted for GIB with initial hemoglobin of 5.6. 2 PRBCs ordered and transfused with the second unit completed at 0630. Pt is totally alert and oriented. Voids spontaneously using urinal without difficulty. Vital signs stable on room air. Afebrile.

## 2023-10-21 LAB
ALBUMIN SERPL-MCNC: 3 G/DL (ref 3.5–5.2)
ANION GAP SERPL CALCULATED.3IONS-SCNC: 3.9 MMOL/L (ref 5–15)
BACTERIA SPEC AEROBE CULT: NO GROWTH
BH BB BLOOD EXPIRATION DATE: NORMAL
BH BB BLOOD TYPE BARCODE: 6200
BH BB DISPENSE STATUS: NORMAL
BH BB PRODUCT CODE: NORMAL
BH BB UNIT NUMBER: NORMAL
BUN SERPL-MCNC: 21 MG/DL (ref 8–23)
BUN/CREAT SERPL: 26.9 (ref 7–25)
CALCIUM SPEC-SCNC: 8.3 MG/DL (ref 8.6–10.5)
CHLORIDE SERPL-SCNC: 109 MMOL/L (ref 98–107)
CO2 SERPL-SCNC: 29.1 MMOL/L (ref 22–29)
CREAT SERPL-MCNC: 0.78 MG/DL (ref 0.76–1.27)
CROSSMATCH INTERPRETATION: NORMAL
DEPRECATED RDW RBC AUTO: 47.6 FL (ref 37–54)
DEPRECATED RDW RBC AUTO: 50 FL (ref 37–54)
EGFRCR SERPLBLD CKD-EPI 2021: 98.4 ML/MIN/1.73
ERYTHROCYTE [DISTWIDTH] IN BLOOD BY AUTOMATED COUNT: 14.9 % (ref 12.3–15.4)
ERYTHROCYTE [DISTWIDTH] IN BLOOD BY AUTOMATED COUNT: 15.2 % (ref 12.3–15.4)
GLUCOSE BLDC GLUCOMTR-MCNC: 116 MG/DL (ref 70–130)
GLUCOSE BLDC GLUCOMTR-MCNC: 124 MG/DL (ref 70–130)
GLUCOSE BLDC GLUCOMTR-MCNC: 132 MG/DL (ref 70–130)
GLUCOSE BLDC GLUCOMTR-MCNC: 186 MG/DL (ref 70–130)
GLUCOSE SERPL-MCNC: 101 MG/DL (ref 65–99)
HCT VFR BLD AUTO: 20 % (ref 37.5–51)
HCT VFR BLD AUTO: 23.1 % (ref 37.5–51)
HCT VFR BLD AUTO: 24.6 % (ref 37.5–51)
HGB BLD-MCNC: 6.7 G/DL (ref 13–17.7)
HGB BLD-MCNC: 7.6 G/DL (ref 13–17.7)
HGB BLD-MCNC: 8.3 G/DL (ref 13–17.7)
MCH RBC QN AUTO: 29.9 PG (ref 26.6–33)
MCH RBC QN AUTO: 30.2 PG (ref 26.6–33)
MCHC RBC AUTO-ENTMCNC: 32.9 G/DL (ref 31.5–35.7)
MCHC RBC AUTO-ENTMCNC: 33.7 G/DL (ref 31.5–35.7)
MCV RBC AUTO: 89.5 FL (ref 79–97)
MCV RBC AUTO: 90.9 FL (ref 79–97)
PHOSPHATE SERPL-MCNC: 2.4 MG/DL (ref 2.5–4.5)
PLATELET # BLD AUTO: 156 10*3/MM3 (ref 140–450)
PLATELET # BLD AUTO: 162 10*3/MM3 (ref 140–450)
PMV BLD AUTO: 10.5 FL (ref 6–12)
PMV BLD AUTO: 10.6 FL (ref 6–12)
POTASSIUM SERPL-SCNC: 4 MMOL/L (ref 3.5–5.2)
RBC # BLD AUTO: 2.54 10*6/MM3 (ref 4.14–5.8)
RBC # BLD AUTO: 2.75 10*6/MM3 (ref 4.14–5.8)
SODIUM SERPL-SCNC: 142 MMOL/L (ref 136–145)
UNIT  ABO: NORMAL
UNIT  RH: NORMAL
WBC NRBC COR # BLD: 10.58 10*3/MM3 (ref 3.4–10.8)
WBC NRBC COR # BLD: 9.76 10*3/MM3 (ref 3.4–10.8)

## 2023-10-21 PROCEDURE — P9016 RBC LEUKOCYTES REDUCED: HCPCS

## 2023-10-21 PROCEDURE — 25010000002 MORPHINE PER 10 MG: Performed by: INTERNAL MEDICINE

## 2023-10-21 PROCEDURE — 80069 RENAL FUNCTION PANEL: CPT | Performed by: INTERNAL MEDICINE

## 2023-10-21 PROCEDURE — 85018 HEMOGLOBIN: CPT | Performed by: INTERNAL MEDICINE

## 2023-10-21 PROCEDURE — 99232 SBSQ HOSP IP/OBS MODERATE 35: CPT | Performed by: INTERNAL MEDICINE

## 2023-10-21 PROCEDURE — 25010000002 ONDANSETRON PER 1 MG

## 2023-10-21 PROCEDURE — 25010000002 METHYLPREDNISOLONE PER 40 MG: Performed by: INTERNAL MEDICINE

## 2023-10-21 PROCEDURE — 36430 TRANSFUSION BLD/BLD COMPNT: CPT

## 2023-10-21 PROCEDURE — 85027 COMPLETE CBC AUTOMATED: CPT | Performed by: INTERNAL MEDICINE

## 2023-10-21 PROCEDURE — 85014 HEMATOCRIT: CPT | Performed by: INTERNAL MEDICINE

## 2023-10-21 PROCEDURE — 86900 BLOOD TYPING SEROLOGIC ABO: CPT

## 2023-10-21 PROCEDURE — 82948 REAGENT STRIP/BLOOD GLUCOSE: CPT

## 2023-10-21 RX ORDER — OXYCODONE HYDROCHLORIDE AND ACETAMINOPHEN 5; 325 MG/1; MG/1
1 TABLET ORAL EVERY 8 HOURS PRN
Status: DISCONTINUED | OUTPATIENT
Start: 2023-10-21 | End: 2023-10-22 | Stop reason: HOSPADM

## 2023-10-21 RX ORDER — PREDNISONE 10 MG/1
10 TABLET ORAL DAILY
Status: DISCONTINUED | OUTPATIENT
Start: 2023-10-21 | End: 2023-10-22 | Stop reason: HOSPADM

## 2023-10-21 RX ORDER — DULOXETIN HYDROCHLORIDE 30 MG/1
30 CAPSULE, DELAYED RELEASE ORAL DAILY
Status: DISCONTINUED | OUTPATIENT
Start: 2023-10-21 | End: 2023-10-22 | Stop reason: HOSPADM

## 2023-10-21 RX ORDER — ACETAMINOPHEN 500 MG
1000 TABLET ORAL EVERY 6 HOURS PRN
Status: DISCONTINUED | OUTPATIENT
Start: 2023-10-21 | End: 2023-10-22 | Stop reason: HOSPADM

## 2023-10-21 RX ORDER — PREDNISOLONE ACETATE 10 MG/ML
1 SUSPENSION/ DROPS OPHTHALMIC 4 TIMES DAILY
COMMUNITY
Start: 2023-09-07

## 2023-10-21 RX ORDER — PREDNISOLONE ACETATE 10 MG/ML
1 SUSPENSION/ DROPS OPHTHALMIC EVERY 6 HOURS SCHEDULED
Status: DISCONTINUED | OUTPATIENT
Start: 2023-10-21 | End: 2023-10-22 | Stop reason: HOSPADM

## 2023-10-21 RX ADMIN — PANTOPRAZOLE SODIUM 40 MG: 40 INJECTION, POWDER, FOR SOLUTION INTRAVENOUS at 21:08

## 2023-10-21 RX ADMIN — ACETAMINOPHEN 1000 MG: 500 TABLET ORAL at 02:17

## 2023-10-21 RX ADMIN — PANTOPRAZOLE SODIUM 40 MG: 40 INJECTION, POWDER, FOR SOLUTION INTRAVENOUS at 09:52

## 2023-10-21 RX ADMIN — ACETAMINOPHEN 1000 MG: 500 TABLET ORAL at 09:52

## 2023-10-21 RX ADMIN — OXYCODONE HYDROCHLORIDE AND ACETAMINOPHEN 1 TABLET: 5; 325 TABLET ORAL at 13:27

## 2023-10-21 RX ADMIN — BICTEGRAVIR SODIUM, EMTRICITABINE, AND TENOFOVIR ALAFENAMIDE FUMARATE 1 TABLET: 50; 200; 25 TABLET ORAL at 09:53

## 2023-10-21 RX ADMIN — PREDNISOLONE ACETATE 1 DROP: 10 SUSPENSION/ DROPS OPHTHALMIC at 17:38

## 2023-10-21 RX ADMIN — OXYCODONE HYDROCHLORIDE AND ACETAMINOPHEN 1 TABLET: 5; 325 TABLET ORAL at 21:44

## 2023-10-21 RX ADMIN — METHYLPREDNISOLONE SODIUM SUCCINATE 20 MG: 40 INJECTION, POWDER, FOR SOLUTION INTRAMUSCULAR; INTRAVENOUS at 09:52

## 2023-10-21 RX ADMIN — ONDANSETRON 4 MG: 2 INJECTION INTRAMUSCULAR; INTRAVENOUS at 05:00

## 2023-10-21 RX ADMIN — MORPHINE SULFATE 2 MG: 2 INJECTION, SOLUTION INTRAMUSCULAR; INTRAVENOUS at 05:00

## 2023-10-21 RX ADMIN — GLYCERIN 1 DROP: .002; .002; .01 SOLUTION/ DROPS OPHTHALMIC at 17:38

## 2023-10-21 NOTE — PROGRESS NOTES
[facility]  Inpatient Progress Note    Reason for Follow Up: GI bleed    Subjective     Interval History:   No further active bleeding, source of bleeding was a small Sarita-Centeno tear that I injected with epinephrine    Current Facility-Administered Medications:     acetaminophen (TYLENOL) tablet 1,000 mg, 1,000 mg, Oral, Q6H PRN, Jeff Humphrey MD, 1,000 mg at 10/21/23 0952    Bictegravir-Emtricitab-Tenofov (BIKTARVY) -25 MG per tablet 1 tablet, 1 tablet, Oral, Daily, Jeff Humphrey MD, 1 tablet at 10/21/23 0953    dextrose (D50W) (25 g/50 mL) IV injection 25 g, 25 g, Intravenous, Q15 Min PRN, Jeff Humphrey MD    dextrose (GLUTOSE) oral gel 15 g, 15 g, Oral, Q15 Min PRN, Jeff Humphrey MD    DULoxetine (CYMBALTA) DR capsule 30 mg, 30 mg, Oral, Daily, Jeff Humphrey MD    glucagon (GLUCAGEN) injection 1 mg, 1 mg, Intramuscular, Q15 Min PRN, Jeff Humphrey MD    Glycerin-Hypromellose- (ARTIFICIAL TEARS) 0.2-0.2-1 % ophthalmic solution solution 1 drop, 1 drop, Both Eyes, Q1H PRN, Jeff Humphrey MD    insulin regular (humuLIN R,novoLIN R) injection 2-9 Units, 2-9 Units, Subcutaneous, Q6H, Jeff Humphrey MD    morphine injection 2 mg, 2 mg, Intravenous, Q4H PRN, Jeff Humphrey MD, 2 mg at 10/21/23 0500    ondansetron (ZOFRAN) injection 4 mg, 4 mg, Intravenous, Q6H PRN, Jeff Humphrey MD, 4 mg at 10/21/23 0500    oxyCODONE-acetaminophen (PERCOCET) 5-325 MG per tablet 1 tablet, 1 tablet, Oral, Q8H PRN, Jeff Humphrey MD    pantoprazole (PROTONIX) injection 40 mg, 40 mg, Intravenous, Q12H, Jeff Humphrey MD, 40 mg at 10/21/23 0952    predniSONE (DELTASONE) tablet 10 mg, 10 mg, Oral, Daily, Jeff Humphrey MD    [COMPLETED] Insert Peripheral IV, , , Once **AND** sodium chloride 0.9 % flush 10 mL, 10 mL, Intravenous, PRN, Jeff Humphrey MD  Review of Systems:    There is weakness and fatigue all other systems reviewed and negative    Objective     Vital Signs  Temp:   "[97.6 °F (36.4 °C)-98.4 °F (36.9 °C)] 97.6 °F (36.4 °C)  Heart Rate:  [] 80  Resp:  [14-22] 16  BP: ()/(48-83) 123/69  Body mass index is 20.37 kg/m².    Intake/Output Summary (Last 24 hours) at 10/21/2023 1307  Last data filed at 10/21/2023 1230  Gross per 24 hour   Intake 1860 ml   Output 875 ml   Net 985 ml     I/O this shift:  In: 480 [P.O.:480]  Out: -      Physical Exam:   General: patient awake, alert and cooperative   Eyes: Normal lids and lashes, no scleral icterus   Neck: supple, normal ROM   Skin: warm and dry, not jaundiced   Cardiovascular: regular rhythm and rate, no murmurs auscultated   Pulm: clear to auscultation bilaterally, regular and unlabored   Abdomen: soft, nontender, nondistended; normal bowel sounds   Extremities: no rash or edema   Psychiatric: Normal mood and behavior; memory intact     Results Review:     I reviewed the patient's new clinical results.    Results from last 7 days   Lab Units 10/21/23  0542 10/21/23  0034 10/20/23  1107 10/19/23  2123   WBC 10*3/mm3 10.58  --  11.49* 11.73*   HEMOGLOBIN g/dL 8.3* 6.7* 7.0* 5.6*   HEMATOCRIT % 24.6* 20.0* 21.3* 18.3*   PLATELETS 10*3/mm3 162  --  157 196     Results from last 7 days   Lab Units 10/21/23  0542 10/19/23  2123   SODIUM mmol/L 142 142   POTASSIUM mmol/L 4.0 3.9   CHLORIDE mmol/L 109* 111*   CO2 mmol/L 29.1* 25.0   BUN mg/dL 21 49*   CREATININE mg/dL 0.78 0.97   CALCIUM mg/dL 8.3* 8.9   BILIRUBIN mg/dL  --  0.2   ALK PHOS U/L  --  31*   ALT (SGPT) U/L  --  12   AST (SGOT) U/L  --  13   GLUCOSE mg/dL 101* 132*         No results found for: \"LIPASE\"    Radiology:  CT Abdomen Pelvis With Contrast   Final Result       1. Slightly thick-walled appearance to the second portion of the   duodenum and proximal third portion of the duodenum. Duodenitis is not   completely excluded.   2. Mild colonic diverticulosis.       Radiation dose reduction techniques were utilized, including automated   exposure control and exposure " modulation based on body size.           This report was finalized on 10/19/2023 11:50 PM by Dr. Karuna Browne M.D on Workstation: [id number]          XR Chest 1 View   Final Result         Electronically signed by Andres Monaco MD on 10-19-23 at 2301          Assessment & Plan     Active Hospital Problems    Diagnosis     **Acute upper GI bleed        Assessment:  GI bleed      Plan:  Esophagitis is the source of GI bleed  Even though the CAT scan was read as thickening of the duodenum, the duodenum on direct visualization was normal  Continue Protonix  Follow hemoglobin  Advance diet as tolerated  I am okay with out of ICU or even discharge home  I discussed the patients findings and my recommendations with patient and nursing staff.    Guero Lake MD

## 2023-10-21 NOTE — PLAN OF CARE
Goal Outcome Evaluation:  Plan of Care Reviewed With: patient        Progress: no change     Pt A+O x4, VSS. PRN medication given for left facial pain. Pt with transfer orders out of ICU. Ongoing care provided.

## 2023-10-21 NOTE — PROGRESS NOTES
"  PROGRESS NOTE  Patient Name: Beth Mckinney  Age/Sex: 68 y.o. male  : 1955  MRN: [id number]    Date of Admission: 10/19/2023  Date of Encounter Visit: 10/21/23   LOS: 2 days   Patient Care Team:  Nirav Donovan Sr., MD as PCP - General (Family Medicine)  Gold Liu MD (Internal Medicine)    Chief Complaint: Upper GI bleed from esophageal tear    Hospital course: Patient is doing a lot better, hemoglobin was down last night and did bounce back up with 1 unit of packed red blood cells, no more sign of active bleeding with her hemodynamically or clinically.  No tachycardia, he is tolerating p.o. diet, was cleared for that by the GI team.  He is on the Protonix IV and will waiting on the GI team to transition to p.o.  He would like to have his as needed pain medication for his postherpetic neuralgia, he cannot tolerate any gabapentin or Lyrica.  No more nausea or vomiting         REVIEW OF SYSTEMS:   CONSTITUTIONAL: no fever or chills  CARDIOVASCULAR: No chest pain, chest pressure or chest discomfort. No palpitations or edema.   RESPIRATORY: No shortness of breath, cough or sputum.   GASTROINTESTINAL: No anorexia, nausea, vomiting or diarrhea. No abdominal pain or blood.   HEMATOLOGIC: No bleeding or bruising.     Ventilator/Non-Invasive Ventilation Settings (From admission, onward)      None              Vital Signs  Temp:  [97.6 °F (36.4 °C)-98.4 °F (36.9 °C)] 97.6 °F (36.4 °C)  Heart Rate:  [] 66  Resp:  [14-22] 16  BP: ()/(48-83) 116/58  SpO2:  [98 %-100 %] 100 %  on    Device (Oxygen Therapy): room air    Intake/Output Summary (Last 24 hours) at 10/21/2023 1214  Last data filed at 10/21/2023 0845  Gross per 24 hour   Intake 1620 ml   Output 1250 ml   Net 370 ml     Flowsheet Rows      Flowsheet Row First Filed Value   Admission Height 188 cm (74\") Documented at 10/19/2023 2046   Admission Weight 79.4 kg (175 lb) Documented at 10/19/2023 2046          Body mass index is " 20.37 kg/m².      10/19/23  2046 10/19/23  2354 10/20/23  1251   Weight: 79.4 kg (175 lb) 72.4 kg (159 lb 9.8 oz) 72 kg (158 lb 11.7 oz)       Physical Exam:  GEN:  No acute distress, alert, cooperative, well developed.  Patient has the healing postherpetic skin lesion on the left side of the trigeminal nerve distribution  EYES:   Sclerae clear. No icterus. PERRL. Normal EOM  ENT:   External ears/nose normal, no oral lesions, no thrush, mucous membranes moist  NECK:  Supple, midline trachea, no JVD  LUNGS: Normal chest on inspection, CTAB, no wheezes. No rhonchi. No crackles. Respirations regular, even and unlabored.   CV:  Regular rhythm and rate. Normal S1/S2. No murmurs, gallops, or rubs noted.  ABD:  Soft, nontender and nondistended. Normal bowel sounds. No guarding  EXT:  Moves all extremities well. No cyanosis. No redness. No edema.   Skin: Dry, intact, no bleeding    Results Review:    Results From Last 14 Days   Lab Units 10/20/23  0713 10/20/23  0320 10/19/23  2205 10/19/23  2123   D DIMER QUANT MCGFEU/mL  --   --   --  0.48   FERRITIN ng/mL  --   --   --  47.50   LACTATE mmol/L 1.1 2.6* 2.4*  --    LDH U/L  --   --   --  125*     Results from last 7 days   Lab Units 10/21/23  0542 10/19/23  2123   SODIUM mmol/L 142 142   POTASSIUM mmol/L 4.0 3.9   CHLORIDE mmol/L 109* 111*   CO2 mmol/L 29.1* 25.0   BUN mg/dL 21 49*   CREATININE mg/dL 0.78 0.97   CALCIUM mg/dL 8.3* 8.9   AST (SGOT) U/L  --  13   ALT (SGPT) U/L  --  12   ANION GAP mmol/L 3.9* 6.0   ALBUMIN g/dL 3.0* 2.6*     Results from last 7 days   Lab Units 10/19/23  2341 10/19/23  2123   HSTROP T ng/L 25* 28*   D DIMER QUANT MCGFEU/mL  --  0.48         Results from last 7 days   Lab Units 10/19/23  2123   PROBNP pg/mL <36.0     Results from last 7 days   Lab Units 10/21/23  0542 10/21/23  0034 10/20/23  1107 10/19/23  2123   WBC 10*3/mm3 10.58  --  11.49* 11.73*   HEMOGLOBIN g/dL 8.3* 6.7* 7.0* 5.6*   HEMATOCRIT % 24.6* 20.0* 21.3* 18.3*   PLATELETS  "10*3/mm3 162  --  157 196   MCV fL 89.5  --  89.5 88.8   NEUTROPHIL % %  --   --  81.6* 65.9   LYMPHOCYTE % %  --   --  12.6* 23.9   MONOCYTES % %  --   --  3.7* 7.6   EOSINOPHIL % %  --   --  0.7 1.3   BASOPHIL % %  --   --  0.5 0.4   IMM GRAN % %  --   --  0.9* 0.9*         Results from last 7 days   Lab Units 10/20/23  0320   MAGNESIUM mg/dL 1.7           Invalid input(s): \"LDLCALC\"          Glucose   Date/Time Value Ref Range Status   10/21/2023 1154 132 (H) 70 - 130 mg/dL Final   10/21/2023 0608 124 70 - 130 mg/dL Final   10/21/2023 0016 116 70 - 130 mg/dL Final   10/20/2023 1606 184 (H) 70 - 130 mg/dL Final   10/20/2023 1131 143 (H) 70 - 130 mg/dL Final   10/20/2023 0608 98 70 - 130 mg/dL Final   10/19/2023 2359 118 70 - 130 mg/dL Final     Results from last 7 days   Lab Units 10/20/23  0713 10/20/23  0320 10/19/23  2205   LACTATE mmol/L 1.1 2.6* 2.4*     Results from last 7 days   Lab Units 10/19/23  2205 10/19/23  2201 10/19/23  2104   BLOODCX  No growth at 24 hours No growth at 24 hours  --    URINECX   --   --  No growth     Results from last 7 days   Lab Units 10/19/23  2104   NITRITE UA  Negative   WBC UA /HPF Too Numerous to Count*   BACTERIA UA /HPF 3+*   SQUAM EPITHEL UA /HPF 13-20*   URINECX  No growth                   Imaging:   Imaging Results (All)       Procedure Component Value Units Date/Time    CT Abdomen Pelvis With Contrast [[id number]] Collected: 10/19/23 2342     Updated: 10/19/23 2354    Narrative:      CT OF THE ABDOMEN AND PELVIS WITH CONTRAST     HISTORY: GI bleeding     COMPARISON: None available.     TECHNIQUE: Axial CT imaging was obtained through the abdomen and pelvis.  IV contrast was administered.     FINDINGS:  Images through the lung bases are clear. Tiny cyst or hemangioma is  suspected within the right lobe of the liver. There is an additional  cyst which is noted within the spleen, which measures 2.3 cm. There is a  small hiatal hernia. The second portion of the duodenum, " as well as the  proximal third portion of the duodenum may be thick walled, although the  appearance may be exaggerated by incomplete distention. However,  duodenitis is not excluded. Pancreas, gallbladder, adrenal glands, and  spleen are all normal. Multiple nonobstructing stones are noted within  the kidneys. The kidneys appear to enhance symmetrically. There is a  simple appearing left renal cyst. No additional follow-up is necessary.  There is some mild caliectasis on the right, of uncertain clinical  significance. No obstructing lesion is seen. I don't see any definite  urothelial enhancement. Prostate gland protrudes into the base of the  bladder. There is mild colonic diverticulosis. No acute osseous  abnormalities are seen. The patient has changes of prior plate and screw  fixation of the left hip. There is no bowel obstruction. There is no  evidence of appendicitis. There are aortoiliac calcifications.       Impression:         1. Slightly thick-walled appearance to the second portion of the  duodenum and proximal third portion of the duodenum. Duodenitis is not  completely excluded.  2. Mild colonic diverticulosis.     Radiation dose reduction techniques were utilized, including automated  exposure control and exposure modulation based on body size.        This report was finalized on 10/19/2023 11:50 PM by Dr. Karuna Browne M.D on Workstation: [id number]       XR Chest 1 View [[id number]] Collected: 10/19/23 2139     Updated: 10/19/23 2301    Narrative:        Patient: MIGUEL KIMBALL  Time Out: 23:01  Exam(s): XR CXR 1 VIEW     EXAM:    XR Chest, 1 View    CLINICAL HISTORY:     Reason for exam: soa.    TECHNIQUE:    Frontal view of the chest.    COMPARISON:    No relevant prior studies available.    FINDINGS:    Lungs:  Unremarkable.  No consolidation.    Pleural space:  Unremarkable.  No pneumothorax.    Heart:  Unremarkable.  No cardiomegaly.    Mediastinum:  Unremarkable.    Bones joints:  Full-thickness LEFT rotator cuff tear.    IMPRESSION:       No focal infiltrate.    Impression:          Electronically signed by Andres Monaco MD on 10-19-23 at 2301            I reviewed the patient's new clinical results.  I personally viewed and interpreted the patient's imaging results:        Medication Review:   Bictegravir-Emtricitab-Tenofov, 1 tablet, Oral, Daily  insulin regular, 2-9 Units, Subcutaneous, Q6H  methylPREDNISolone sodium succinate, 20 mg, Intravenous, Daily  pantoprazole, 40 mg, Intravenous, Q12H             ASSESSMENT:   Acute blood loss anemia  Upper GI bleed from Sarita-Centeno tear  Hypotension secondary to above from blood loss anemia  Recent use of NSAIDs for shingles pain  History of colon cancer 2012, in remission  HIV disease on HAART  Cardiac murmur  Rheumatoid arthritis on chronic prednisone therapy  Type 2 diabetes mellitus    PLAN:  Patient is on the methylprednisolone stress dose, down to 20 mg daily, home regimen is usually 10 mg of prednisone once a day.  Patient is off the metformin until hemodynamically stable and currently on the Protonix IV and off the Protonix drip.  The urinalysis showed some pyuria but the cultures and is no growth to date.  Accu-Chek are within acceptable normal range, white count is down to normal, hemoglobin did drop and bounced back with IV packed red blood cell transfusion and will be continuing to monitor  Kidney function showed normal electrolyte with normal BUN and creatinine.  Lactic acid was slightly elevated but is down to normal on follow-up labs and the patient is afebrile and on room air.  We will transition to oral PPI once okay with GI, they will be following  We will transfer to stepdown unit  Hopefully home tomorrow on oral regimen if he continues to improve  Resume his home regimen of p.o. prednisone  Discussed with patient  Labs/Notes/films were independently reviewed and pertinent results are summarized above  The copied texts in this  note were reviewed and they are accurate as of 10/21/23    Disposition: Stepdown unit today and hopefully home tomorrow    Jeff Humphrey MD  10/21/23  12:14 EDT           Dictated utilizing Dragon dictation

## 2023-10-21 NOTE — PLAN OF CARE
Goal Outcome Evaluation:  Received another unit of PRBCs this shift for Hgb of 6.7 with repeat Hgb post transfusion of 8.3 this morning. Pt is hemodynamically stable.

## 2023-10-22 ENCOUNTER — READMISSION MANAGEMENT (OUTPATIENT)
Dept: CALL CENTER | Facility: HOSPITAL | Age: 68
End: 2023-10-22
Payer: MEDICARE

## 2023-10-22 VITALS
OXYGEN SATURATION: 100 % | TEMPERATURE: 97.6 F | DIASTOLIC BLOOD PRESSURE: 71 MMHG | BODY MASS INDEX: 20.37 KG/M2 | SYSTOLIC BLOOD PRESSURE: 140 MMHG | RESPIRATION RATE: 16 BRPM | WEIGHT: 158.73 LBS | HEIGHT: 74 IN | HEART RATE: 73 BPM

## 2023-10-22 PROBLEM — K92.2 ACUTE UPPER GI BLEED: Status: RESOLVED | Noted: 2023-10-19 | Resolved: 2023-10-22

## 2023-10-22 LAB
ALBUMIN SERPL-MCNC: 3.1 G/DL (ref 3.5–5.2)
ANION GAP SERPL CALCULATED.3IONS-SCNC: 4 MMOL/L (ref 5–15)
BUN SERPL-MCNC: 17 MG/DL (ref 8–23)
BUN/CREAT SERPL: 21 (ref 7–25)
CALCIUM SPEC-SCNC: 8.2 MG/DL (ref 8.6–10.5)
CHLORIDE SERPL-SCNC: 105 MMOL/L (ref 98–107)
CO2 SERPL-SCNC: 28 MMOL/L (ref 22–29)
CREAT SERPL-MCNC: 0.81 MG/DL (ref 0.76–1.27)
DEPRECATED RDW RBC AUTO: 50.9 FL (ref 37–54)
EGFRCR SERPLBLD CKD-EPI 2021: 97.2 ML/MIN/1.73
ERYTHROCYTE [DISTWIDTH] IN BLOOD BY AUTOMATED COUNT: 15.5 % (ref 12.3–15.4)
GLUCOSE BLDC GLUCOMTR-MCNC: 115 MG/DL (ref 70–130)
GLUCOSE BLDC GLUCOMTR-MCNC: 116 MG/DL (ref 70–130)
GLUCOSE BLDC GLUCOMTR-MCNC: 123 MG/DL (ref 70–130)
GLUCOSE SERPL-MCNC: 101 MG/DL (ref 65–99)
HCT VFR BLD AUTO: 26.7 % (ref 37.5–51)
HGB BLD-MCNC: 8.7 G/DL (ref 13–17.7)
MCH RBC QN AUTO: 29.8 PG (ref 26.6–33)
MCHC RBC AUTO-ENTMCNC: 32.6 G/DL (ref 31.5–35.7)
MCV RBC AUTO: 91.4 FL (ref 79–97)
PHOSPHATE SERPL-MCNC: 2.4 MG/DL (ref 2.5–4.5)
PLATELET # BLD AUTO: 173 10*3/MM3 (ref 140–450)
PMV BLD AUTO: 10.4 FL (ref 6–12)
POTASSIUM SERPL-SCNC: 3.5 MMOL/L (ref 3.5–5.2)
RBC # BLD AUTO: 2.92 10*6/MM3 (ref 4.14–5.8)
SODIUM SERPL-SCNC: 137 MMOL/L (ref 136–145)
WBC NRBC COR # BLD: 8.71 10*3/MM3 (ref 3.4–10.8)

## 2023-10-22 PROCEDURE — 82948 REAGENT STRIP/BLOOD GLUCOSE: CPT

## 2023-10-22 PROCEDURE — 85027 COMPLETE CBC AUTOMATED: CPT | Performed by: INTERNAL MEDICINE

## 2023-10-22 PROCEDURE — 80069 RENAL FUNCTION PANEL: CPT | Performed by: INTERNAL MEDICINE

## 2023-10-22 PROCEDURE — 99232 SBSQ HOSP IP/OBS MODERATE 35: CPT | Performed by: INTERNAL MEDICINE

## 2023-10-22 RX ORDER — PANTOPRAZOLE SODIUM 40 MG/1
40 TABLET, DELAYED RELEASE ORAL 2 TIMES DAILY
Qty: 60 TABLET | Refills: 0 | Status: SHIPPED | OUTPATIENT
Start: 2023-10-22 | End: 2023-11-21

## 2023-10-22 RX ADMIN — PREDNISOLONE ACETATE 1 DROP: 10 SUSPENSION/ DROPS OPHTHALMIC at 12:18

## 2023-10-22 RX ADMIN — PANTOPRAZOLE SODIUM 40 MG: 40 INJECTION, POWDER, FOR SOLUTION INTRAVENOUS at 08:15

## 2023-10-22 RX ADMIN — ACETAMINOPHEN 1000 MG: 500 TABLET ORAL at 10:38

## 2023-10-22 RX ADMIN — PREDNISOLONE ACETATE 1 DROP: 10 SUSPENSION/ DROPS OPHTHALMIC at 00:10

## 2023-10-22 RX ADMIN — OXYCODONE HYDROCHLORIDE AND ACETAMINOPHEN 1 TABLET: 5; 325 TABLET ORAL at 15:07

## 2023-10-22 RX ADMIN — OXYCODONE HYDROCHLORIDE AND ACETAMINOPHEN 1 TABLET: 5; 325 TABLET ORAL at 06:34

## 2023-10-22 RX ADMIN — BICTEGRAVIR SODIUM, EMTRICITABINE, AND TENOFOVIR ALAFENAMIDE FUMARATE 1 TABLET: 50; 200; 25 TABLET ORAL at 08:15

## 2023-10-22 RX ADMIN — PREDNISOLONE ACETATE 1 DROP: 10 SUSPENSION/ DROPS OPHTHALMIC at 06:34

## 2023-10-22 NOTE — PROGRESS NOTES
[facility]  Inpatient Progress Note    Reason for Follow Up: GI bleed    Subjective     Interval History:   GI bleed source was mild esophagitis but a Sarita-Centeno tear superimposed which was injected with epi no further bleeding    Current Facility-Administered Medications:     acetaminophen (TYLENOL) tablet 1,000 mg, 1,000 mg, Oral, Q6H PRN, Jeff Humphrey MD, 1,000 mg at 10/22/23 1038    Bictegravir-Emtricitab-Tenofov (BIKTARVY) -25 MG per tablet 1 tablet, 1 tablet, Oral, Daily, Jeff Humphrey MD, 1 tablet at 10/22/23 0815    dextrose (D50W) (25 g/50 mL) IV injection 25 g, 25 g, Intravenous, Q15 Min PRN, Jeff Humphrey MD    dextrose (GLUTOSE) oral gel 15 g, 15 g, Oral, Q15 Min PRN, Jeff Humphrey MD    DULoxetine (CYMBALTA) DR capsule 30 mg, 30 mg, Oral, Daily, Jeff Humphrey MD    glucagon (GLUCAGEN) injection 1 mg, 1 mg, Intramuscular, Q15 Min PRN, Jeff Humphrey MD    Glycerin-Hypromellose- (ARTIFICIAL TEARS) 0.2-0.2-1 % ophthalmic solution solution 1 drop, 1 drop, Both Eyes, Q1H PRN, Jeff Humphrey MD, 1 drop at 10/21/23 1738    Influenza Vac High-Dose Quad (FLUZONE HIGH DOSE) injection 0.7 mL, 0.7 mL, Intramuscular, During Hospitalization, Kennedy Muller MD    insulin regular (humuLIN R,novoLIN R) injection 2-9 Units, 2-9 Units, Subcutaneous, Q6H, Jeff Humphrey MD    morphine injection 2 mg, 2 mg, Intravenous, Q4H PRN, Jeff Humphrey MD, 2 mg at 10/21/23 0500    ondansetron (ZOFRAN) injection 4 mg, 4 mg, Intravenous, Q6H PRN, Jeff Humphrey MD, 4 mg at 10/21/23 0500    oxyCODONE-acetaminophen (PERCOCET) 5-325 MG per tablet 1 tablet, 1 tablet, Oral, Q8H PRN, Jeff Humphrey MD, 1 tablet at 10/22/23 0634    pantoprazole (PROTONIX) injection 40 mg, 40 mg, Intravenous, Q12H, Jeff Humphrey MD, 40 mg at 10/22/23 0815    prednisoLONE acetate (PRED FORTE) 1 % ophthalmic suspension 1 drop, 1 drop, Left Eye, Q6H, Jeff Humphrey MD, 1 drop at 10/22/23 0634     "predniSONE (DELTASONE) tablet 10 mg, 10 mg, Oral, Daily, Jeff Humphrey MD    [COMPLETED] Insert Peripheral IV, , , Once **AND** sodium chloride 0.9 % flush 10 mL, 10 mL, Intravenous, PRN, Jeff Humphrey MD  Review of Systems:    There is weakness and fatigue all other systems reviewed and negative    Objective     Vital Signs  Temp:  [97.7 °F (36.5 °C)-98 °F (36.7 °C)] 97.8 °F (36.6 °C)  Heart Rate:  [65-85] 69  Resp:  [16-18] 16  BP: (105-136)/(54-69) 128/68  Body mass index is 20.37 kg/m².    Intake/Output Summary (Last 24 hours) at 10/22/2023 1056  Last data filed at 10/22/2023 0500  Gross per 24 hour   Intake 240 ml   Output 1200 ml   Net -960 ml     No intake/output data recorded.     Physical Exam:   General: patient awake, alert and cooperative   Eyes: Normal lids and lashes, no scleral icterus   Neck: supple, normal ROM   Skin: warm and dry, not jaundiced   Cardiovascular: regular rhythm and rate, no murmurs auscultated   Pulm: clear to auscultation bilaterally, regular and unlabored   Abdomen: soft, nontender, nondistended; normal bowel sounds   Extremities: no rash or edema   Psychiatric: Normal mood and behavior; memory intact     Results Review:     I reviewed the patient's new clinical results.    Results from last 7 days   Lab Units 10/22/23  0840 10/21/23  2012 10/21/23  0542   WBC 10*3/mm3 8.71 9.76 10.58   HEMOGLOBIN g/dL 8.7* 7.6* 8.3*   HEMATOCRIT % 26.7* 23.1* 24.6*   PLATELETS 10*3/mm3 173 156 162     Results from last 7 days   Lab Units 10/22/23  0840 10/21/23  0542 10/19/23  2123   SODIUM mmol/L 137 142 142   POTASSIUM mmol/L 3.5 4.0 3.9   CHLORIDE mmol/L 105 109* 111*   CO2 mmol/L 28.0 29.1* 25.0   BUN mg/dL 17 21 49*   CREATININE mg/dL 0.81 0.78 0.97   CALCIUM mg/dL 8.2* 8.3* 8.9   BILIRUBIN mg/dL  --   --  0.2   ALK PHOS U/L  --   --  31*   ALT (SGPT) U/L  --   --  12   AST (SGOT) U/L  --   --  13   GLUCOSE mg/dL 101* 101* 132*         No results found for: \"LIPASE\"    Radiology:  CT " Abdomen Pelvis With Contrast   Final Result       1. Slightly thick-walled appearance to the second portion of the   duodenum and proximal third portion of the duodenum. Duodenitis is not   completely excluded.   2. Mild colonic diverticulosis.       Radiation dose reduction techniques were utilized, including automated   exposure control and exposure modulation based on body size.           This report was finalized on 10/19/2023 11:50 PM by Dr. Karuna Browne M.D on Workstation: [id number]          XR Chest 1 View   Final Result         Electronically signed by Andres Monaco MD on 10-19-23 at 2301          Assessment & Plan     Active Hospital Problems    Diagnosis     **Acute upper GI bleed      Assessment:  GI bleed        Plan:  Esophagitis and Sarita-Centeno as a source of bleed, injected with epi  Even though the CAT scan was read as thickening of the duodenum, the duodenum on direct visualization was normal  Continue Protonix  Follow hemoglobin  Advance diet as tolerated  Okay with discharge home  We will see peripherally  I discussed the patients findings and my recommendations with patient and nursing staff.    Guero Lake MD

## 2023-10-22 NOTE — DISCHARGE SUMMARY
DISCHARGE SUMMARY    Patient Name: Beth Mckinney  Age/Sex: 68 y.o. male  : 1955  MRN: [id number]  Patient Care Team:  Nirav Donovan Sr., MD as PCP - General (Family Medicine)  Gold Liu MD (Internal Medicine)       Date of Admit: 10/19/2023  Date of Discharge:  10/22/23  Discharge Condition: Stable    Discharge Diagnoses:  Acute blood loss anemia  Upper GI bleed from Sarita-Centeno tear  Hypotension secondary to above from blood loss anemia  Recent use of NSAIDs for shingles pain  History of colon cancer , in remission  HIV disease on HAART  Cardiac murmur  Rheumatoid arthritis on chronic prednisone therapy  Type 2 diabetes mellitus    History of present illness from H&P from 10/19/2023:   I was asked to admit this 68-year-old male from the ED.  Patient presented with shortness of breath.  He is also been dizzy lightheaded and having falls.  His admission hemoglobin was 5.6.  Patient states he has been taking ibuprofen twice a day for the last week or so because of pain from shingles on his face.  He reports a history of ulcers and had endoscopy in the past.  However he cannot give me details.  He has a history of colon cancer in  with previous resection and has seen Dr. Ronald Sky at [facility].  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  He has HIV disease since  treated with HAART therapy by Dr. Andrew Don at [facility].  Patient has been intermittently compliant with his HAART therapy.  Patient did not volunteer history of HIV disease and this was discovered in care everywhere.  Additionally history includes MI in the past and previous stroke as well as a reported diagnosis of rheumatoid arthritis and his med list includes prednisone 10 mg daily.  He developed ocular shingles and was hospitalized for the same the last few months, however this is now getting better.  Has diabetes controlled apparently on metformin.  Patient states he is a never smoker and no  alcohol intake.     Beth Mckinney  reports current alcohol use.,  reports that he has never smoked. He does not have any smokeless tobacco history on file.    Hospital Course:   Beth Mckinney presented to [facility] with complaints of  Nausea and vomiting that resulted in upper GI bleed from Sarita-Centeno tear with some element of esophagitis, patient was seen by GI, had EGD and the area of bleeding was injected, patient did much better on follow-up, hemoglobin was bouncing back up towards normal.  Patient was on the Protonix drip and will be discharged home on Protonix twice daily.  He was on baby aspirin and according to nursing staff this was cleared to be resumed by the GI team  Patient was on Pepcid which will be replaced by Protonix  Patient was taking some as needed nonsteroidal anti-inflammatory medication was instructed not to anymore  Patient has some Lortab that he is to take at home and he can take them as needed for pain at least until he follow-up with his primary care physician for more medication for his postherpetic neuralgia  Patient  can go back on his oral diabetic medication  For some reason patient was on Cymbalta on prednisone because that was listed on his daily medication list however that was not the case so they were both discontinued    Consults:   IP CONSULT TO GASTROENTEROLOGY  IP CONSULT TO PULMONOLOGY  IP CONSULT TO GASTROENTEROLOGY    Significant Discharge Diagnostics   Procedures Performed:  Procedure(s):  ESOPHAGOGASTRODUODENOSCOPY AT BEDSIDE with epi injection  10/20 1436 UPPER GI ENDOSCOPY    Pertinent Lab Results:  Results from last 7 days   Lab Units 10/22/23  0840 10/21/23  0542 10/19/23  2123   SODIUM mmol/L 137 142 142   POTASSIUM mmol/L 3.5 4.0 3.9   CHLORIDE mmol/L 105 109* 111*   CO2 mmol/L 28.0 29.1* 25.0   BUN mg/dL 17 21 49*   CREATININE mg/dL 0.81 0.78 0.97   GLUCOSE mg/dL 101* 101* 132*   CALCIUM mg/dL 8.2* 8.3* 8.9   AST  (SGOT) U/L  --   --  13   ALT (SGPT) U/L  --   --  12     Results from last 7 days   Lab Units 10/19/23  2341 10/19/23  2123   HSTROP T ng/L 25* 28*     Results from last 7 days   Lab Units 10/22/23  0840 10/21/23  2012 10/21/23  0542 10/21/23  0034 10/20/23  1107 10/19/23  2123   WBC 10*3/mm3 8.71 9.76 10.58  --  11.49* 11.73*   HEMOGLOBIN g/dL 8.7* 7.6* 8.3* 6.7* 7.0* 5.6*   HEMATOCRIT % 26.7* 23.1* 24.6* 20.0* 21.3* 18.3*   PLATELETS 10*3/mm3 173 156 162  --  157 196   MCV fL 91.4 90.9 89.5  --  89.5 88.8   MCH pg 29.8 29.9 30.2  --  29.4 27.2   MCHC g/dL 32.6 32.9 33.7  --  32.9 30.6*   RDW % 15.5* 15.2 14.9  --  14.7 15.4   RDW-SD fl 50.9 50.0 47.6  --  46.9 48.0   MPV fL 10.4 10.5 10.6  --  11.3 11.0   NEUTROPHIL % %  --   --   --   --  81.6* 65.9   LYMPHOCYTE % %  --   --   --   --  12.6* 23.9   MONOCYTES % %  --   --   --   --  3.7* 7.6   EOSINOPHIL % %  --   --   --   --  0.7 1.3   BASOPHIL % %  --   --   --   --  0.5 0.4   IMM GRAN % %  --   --   --   --  0.9* 0.9*   NEUTROS ABS 10*3/mm3  --   --   --   --  9.37* 7.74*   LYMPHS ABS 10*3/mm3  --   --   --   --  1.45 2.80   MONOS ABS 10*3/mm3  --   --   --   --  0.43 0.89   EOS ABS 10*3/mm3  --   --   --   --  0.08 0.15   BASOS ABS 10*3/mm3  --   --   --   --  0.06 0.05   IMMATURE GRANS (ABS) 10*3/mm3  --   --   --   --  0.10* 0.10*   NRBC /100 WBC  --   --   --   --  0.3* 0.3*         Results from last 7 days   Lab Units 10/20/23  0320   MAGNESIUM mg/dL 1.7         Results from last 7 days   Lab Units 10/19/23  2123   PROBNP pg/mL <36.0             Results from last 7 days   Lab Units 10/20/23  0713 10/20/23  0320 10/19/23  2205   LACTATE mmol/L 1.1 2.6* 2.4*             Results from last 7 days   Lab Units 10/19/23  2205 10/19/23  2201 10/19/23  2104   BLOODCX  No growth at 2 days No growth at 2 days  --    URINECX   --   --  No growth     Results from last 7 days   Lab Units 10/19/23  2104   NITRITE UA  Negative   WBC UA /HPF Too Numerous to Count*    BACTERIA UA /HPF 3+*   SQUAM EPITHEL UA /HPF 13-20*   URINECX  No growth               Imaging Results:  Imaging Results (All)       Procedure Component Value Units Date/Time    CT Abdomen Pelvis With Contrast [[id number]] Collected: 10/19/23 2342     Updated: 10/19/23 2354    Narrative:      CT OF THE ABDOMEN AND PELVIS WITH CONTRAST     HISTORY: GI bleeding     COMPARISON: None available.     TECHNIQUE: Axial CT imaging was obtained through the abdomen and pelvis.  IV contrast was administered.     FINDINGS:  Images through the lung bases are clear. Tiny cyst or hemangioma is  suspected within the right lobe of the liver. There is an additional  cyst which is noted within the spleen, which measures 2.3 cm. There is a  small hiatal hernia. The second portion of the duodenum, as well as the  proximal third portion of the duodenum may be thick walled, although the  appearance may be exaggerated by incomplete distention. However,  duodenitis is not excluded. Pancreas, gallbladder, adrenal glands, and  spleen are all normal. Multiple nonobstructing stones are noted within  the kidneys. The kidneys appear to enhance symmetrically. There is a  simple appearing left renal cyst. No additional follow-up is necessary.  There is some mild caliectasis on the right, of uncertain clinical  significance. No obstructing lesion is seen. I don't see any definite  urothelial enhancement. Prostate gland protrudes into the base of the  bladder. There is mild colonic diverticulosis. No acute osseous  abnormalities are seen. The patient has changes of prior plate and screw  fixation of the left hip. There is no bowel obstruction. There is no  evidence of appendicitis. There are aortoiliac calcifications.       Impression:         1. Slightly thick-walled appearance to the second portion of the  duodenum and proximal third portion of the duodenum. Duodenitis is not  completely excluded.  2. Mild colonic diverticulosis.     Radiation dose  reduction techniques were utilized, including automated  exposure control and exposure modulation based on body size.        This report was finalized on 10/19/2023 11:50 PM by Dr. Karuna Browne M.D on Workstation: [id number]       XR Chest 1 View [[id number]] Collected: 10/19/23 2139     Updated: 10/19/23 2301    Narrative:        Patient: MIGUEL KIMBALL  Time Out: 23:01  Exam(s): XR CXR 1 VIEW     EXAM:    XR Chest, 1 View    CLINICAL HISTORY:     Reason for exam: soa.    TECHNIQUE:    Frontal view of the chest.    COMPARISON:    No relevant prior studies available.    FINDINGS:    Lungs:  Unremarkable.  No consolidation.    Pleural space:  Unremarkable.  No pneumothorax.    Heart:  Unremarkable.  No cardiomegaly.    Mediastinum:  Unremarkable.    Bones joints: Full-thickness LEFT rotator cuff tear.    IMPRESSION:       No focal infiltrate.    Impression:          Electronically signed by Andres Monaco MD on 10-19-23 at 2301            Objective:   Temp:  [97.6 °F (36.4 °C)-98 °F (36.7 °C)] 97.6 °F (36.4 °C)  Heart Rate:  [65-85] 73  Resp:  [16-18] 16  BP: (105-140)/(54-71) 140/71   SpO2:  [96 %-100 %] 100 %  on    Device (Oxygen Therapy): room air    Intake/Output Summary (Last 24 hours) at 10/22/2023 1355  Last data filed at 10/22/2023 1300  Gross per 24 hour   Intake 240 ml   Output 1200 ml   Net -960 ml     Body mass index is 20.37 kg/m².      10/19/23  2046 10/19/23  2354 10/20/23  1251   Weight: 79.4 kg (175 lb) 72.4 kg (159 lb 9.8 oz) 72 kg (158 lb 11.7 oz)     Weight change:     Physical Exam:      General: Alert, cooperative, in no acute distress.  Patient has the healing herpetic lesion on the left side of the face which continues to slowly improve         HEENT:  No oral lesions. No thrush. Oral mucosa moist.   Chest Wall:  No abnormalities observed.             Neck:  Trachea midline. No JVD.   Pulmonary:  CTAB. No wheezes. Respirations regular, even and unlabored.          Cardio:  Regular rhythm  and normal rate. Normal S1 and S2. No murmur, gallop, rub or click.    Abdominal:  Soft, non-tender and non-distended. Normal bowel sounds. No masses. No organomegaly. No guarding. No rebound tenderness.  Extremities:  Moves all extremities well. No cyanosis. No redness. No edema.     Discharge Medications and Instructions:     Discharge Medications     Discharge Medications        New Medications        Instructions Start Date   pantoprazole 40 MG EC tablet  Commonly known as: Protonix   40 mg, Oral, 2 Times Daily             Continue These Medications        Instructions Start Date   aspirin 81 MG EC tablet   81 mg, Oral, Daily      Biktarvy -25 MG per tablet  Generic drug: Bictegravir-Emtricitab-Tenofov   1 tablet, Oral, Daily      metFORMIN 1000 MG tablet  Commonly known as: GLUCOPHAGE   1,000 mg, Oral, Daily With Breakfast      ondansetron 4 MG tablet  Commonly known as: ZOFRAN   4 mg, Oral, Every 4 Hours PRN      prednisoLONE acetate 1 % ophthalmic suspension  Commonly known as: PRED FORTE   1 drop, Ophthalmic, 4 Times Daily      silver sulfadiazine 1 % cream  Commonly known as: SILVADENE, SSD   1 application , Topical, Daily             Stop These Medications      amitriptyline 25 MG tablet  Commonly known as: ELAVIL     dapsone 100 MG tablet     famotidine 20 MG tablet  Commonly known as: PEPCID     HYDROcodone-acetaminophen 5-325 MG per tablet  Commonly known as: NORCO     ibuprofen 600 MG tablet  Commonly known as: ADVIL,MOTRIN     pregabalin 150 MG capsule  Commonly known as: LYRICA              Discharge Diet:    Dietary Orders (From admission, onward)       Start     Ordered    10/21/23 6384  Diet: Diabetic Diets; Consistent Carbohydrate; Texture: Regular Texture (IDDSI 7); Fluid Consistency: Thin (IDDSI 0)  Diet Effective Now        References:    Diet Order [place]   Question Answer Comment   Diets: Diabetic Diets    Diabetic Diet: Consistent Carbohydrate    Texture: Regular Texture (IDDSI 7)     Fluid Consistency: Thin (IDDSI 0)        10/21/23 0946                    Activity at Discharge:   As tolerated    Discharge disposition: Home    Discharge Instructions and Follow ups:       Follow-up Information       Nirav Donovan Sr., MD .    Specialties: Family Medicine, Urgent Care  Contact information:  [address]  [place] [address]  [phone]                           Future Appointments   Date Time Provider Department Center   11/2/2023 11:15 AM Nirav Donovan Sr., MD [facility]        Medication Reconciliation: Please see electronically completed Med Rec.    Total time spent discharging patient including evaluation, medication reconciliation, arranging follow up, and post hospitalization instructions and education total time exceeds 30 minutes.     Jeff Humphrey MD  10/22/23  13:55 EDT      Dictated utilizing Dragon dictation

## 2023-10-22 NOTE — PLAN OF CARE
Problem: Adult Inpatient Plan of Care  Goal: Plan of Care Review  Outcome: Ongoing, Progressing  Flowsheets (Taken 10/22/2023 0243)  Plan of Care Reviewed With: patient  Outcome Evaluation: recieved pt from ICU,  pt in no distress.  co pain w left eye from history of shingles.  medicated for co pain per mar.  pt refused bedalarm this shift. resting on and off w eyes closed. no distress noted.  cont to monitor HGB, next lab draw due at 0800.   Goal Outcome Evaluation:  Plan of Care Reviewed With: patient           Outcome Evaluation: recieved pt from ICU,  pt in no distress.  co pain w left eye from history of shingles.  medicated for co pain per mar.  pt refused bedalarm this shift. resting on and off w eyes closed. no distress noted.  cont to monitor HGB, next lab draw due at 0800.

## 2023-10-22 NOTE — PLAN OF CARE
Goal Outcome Evaluation:  Plan of Care Reviewed With: patient        Progress: improving  Outcome Evaluation: sinus rhythm, room air, A/Ox4. c/o pain with left eyes r/t hx of shingles. PRN Tylenol given per pain management. AM HgB at 8.7. Potential d/c to home today pending Attending MD recommendations. Family to transport upon d/c.

## 2023-10-22 NOTE — OUTREACH NOTE
Prep Survey      Flowsheet Row Responses   [facility] patient discharged from? [place]   Is LACE score < 7 ? No   Eligibility [facility]   Date of Admission 10/19/23   Date of Discharge 10/22/23   Discharge diagnosis Acute blood loss anemia   Does the patient have one of the following disease processes/diagnoses(primary or secondary)? Other   Does the patient have Home health ordered? No   Is there a DME ordered? No   Prep survey completed? Yes            JAZZ OCONNELL - Registered Nurse

## 2023-10-23 ENCOUNTER — TRANSITIONAL CARE MANAGEMENT TELEPHONE ENCOUNTER (OUTPATIENT)
Dept: CALL CENTER | Facility: HOSPITAL | Age: 68
End: 2023-10-23
Payer: MEDICARE

## 2023-10-23 NOTE — CASE MANAGEMENT/SOCIAL WORK
Case Management Discharge Note      Final Note: Discharged home    Provided Post Acute Provider List?: Yes  Post Acute Provider List: Home Health, Nursing Home  Delivered To: Support Person  Support Person: pt's sister Petra Whitfield  Method of Delivery: In person    Selected Continued Care - Discharged on 10/22/2023 Admission date: 10/19/2023 - Discharge disposition: Home or Self Care      Destination    No services have been selected for the patient.                Durable Medical Equipment    No services have been selected for the patient.                Dialysis/Infusion    No services have been selected for the patient.                Home Medical Care    No services have been selected for the patient.                Therapy    No services have been selected for the patient.                Community Resources    No services have been selected for the patient.                Community & DME    No services have been selected for the patient.                    Transportation Services  Private: Car    Final Discharge Disposition Code: 01 - home or self-care

## 2023-10-23 NOTE — OUTREACH NOTE
Call Center TCM Note      Flowsheet Row Responses   [facility] [place] patient discharged from? [place]   Does the patient have one of the following disease processes/diagnoses(primary or secondary)? Other   TCM attempt successful? Yes   Call start time 1309   Call end time 1312   Discharge diagnosis Acute blood loss anemia   Meds reviewed with patient/caregiver? Yes   Is the patient having any side effects they believe may be caused by any medication additions or changes? No   Does the patient have all medications ordered at discharge? Yes   Is the patient taking all medications as directed (includes completed medication regime)? Yes   Comments States he has appt on Thurs.   Does the patient have an appointment with their PCP within 7-14 days of discharge? Yes   Psychosocial issues? No   Did the patient receive a copy of their discharge instructions? Yes   Nursing interventions Reviewed instructions with patient   What is the patient's perception of their health status since discharge? Improving   Is the patient/caregiver able to teach back signs and symptoms related to disease process for when to call PCP? Yes   Is the patient/caregiver able to teach back signs and symptoms related to disease process for when to call 911? Yes   Is the patient/caregiver able to teach back the hierarchy of who to call/visit for symptoms/problems? PCP, Specialist, Home health nurse, Urgent Care, ED, 911 Yes   If the patient is a current smoker, are they able to teach back resources for cessation? Not a smoker   Additional teach back comments States he is not doing very well.  Has complaints of soa and diarrhea.  Denies any chest pains.  States he has appt with PCP on Thurs.  If symptoms do not improve or worsen, he will go to ED.   TCM call completed? Yes   Call end time 1312            Taylor Reid LPN    10/23/2023, 13:12 EDT

## 2023-10-24 LAB
BACTERIA SPEC AEROBE CULT: NORMAL
BACTERIA SPEC AEROBE CULT: NORMAL

## 2023-10-25 NOTE — ANESTHESIA POSTPROCEDURE EVALUATION
"Patient: Beth Mckinney    Procedure Summary       Date: 10/20/23 Room / Location:  [facility] 8 /  [facility]    Anesthesia Start: 1442 Anesthesia Stop: 1458    Procedure: ESOPHAGOGASTRODUODENOSCOPY AT BEDSIDE with epi injection (Esophagus) Diagnosis:       Acute upper GI bleed      (Acute upper GI bleed [K92.2])    Surgeons: Guero Lake MD Provider: Isaac Barlow MD    Anesthesia Type: MAC ASA Status: 3 - Emergent            Anesthesia Type: No value filed.    Vitals  Vitals Value Taken Time   /71 10/22/23 1100   Temp 36.4 °C (97.6 °F) 10/22/23 1100   Pulse 72 10/22/23 1513   Resp 16 10/22/23 1100   SpO2 100 % 10/22/23 1421   Vitals shown include unfiled device data.        Post Anesthesia Care and Evaluation    Pain management: adequate    Airway patency: patent  Anesthetic complications: No anesthetic complications    Cardiovascular status: acceptable  Respiratory status: acceptable  Hydration status: acceptable    Comments: /71 (BP Location: Right arm, Patient Position: Lying)   Pulse 73   Temp 36.4 °C (97.6 °F) (Oral)   Resp 16   Ht 188 cm (74.02\")   Wt 72 kg (158 lb 11.7 oz)   SpO2 100%   BMI 20.37 kg/m²           "

## 2023-10-26 ENCOUNTER — TELEPHONE (OUTPATIENT)
Dept: FAMILY MEDICINE CLINIC | Facility: CLINIC | Age: 68
End: 2023-10-26

## 2023-10-26 ENCOUNTER — OFFICE VISIT (OUTPATIENT)
Dept: FAMILY MEDICINE CLINIC | Facility: CLINIC | Age: 68
End: 2023-10-26
Payer: MEDICARE

## 2023-10-26 VITALS
WEIGHT: 159 LBS | DIASTOLIC BLOOD PRESSURE: 68 MMHG | HEART RATE: 82 BPM | TEMPERATURE: 98.2 F | RESPIRATION RATE: 20 BRPM | SYSTOLIC BLOOD PRESSURE: 122 MMHG | HEIGHT: 74 IN | OXYGEN SATURATION: 99 % | BODY MASS INDEX: 20.41 KG/M2

## 2023-10-26 DIAGNOSIS — B02.29 NEURALGIA, POST-HERPETIC: Primary | ICD-10-CM

## 2023-10-26 DIAGNOSIS — D50.9 IRON DEFICIENCY ANEMIA, UNSPECIFIED IRON DEFICIENCY ANEMIA TYPE: ICD-10-CM

## 2023-10-26 DIAGNOSIS — E13.69 OTHER SPECIFIED DIABETES MELLITUS WITH OTHER SPECIFIED COMPLICATION, WITHOUT LONG-TERM CURRENT USE OF INSULIN: ICD-10-CM

## 2023-10-26 DIAGNOSIS — Z12.5 SCREENING PSA (PROSTATE SPECIFIC ANTIGEN): Primary | ICD-10-CM

## 2023-10-26 LAB
HIV1 RNA # SERPL NAA+PROBE: <40 COPIES/ML
HIV1 RNA SERPL NAA+PROBE-LOG#: NORMAL LOG10COPY/ML

## 2023-10-26 RX ORDER — FERROUS SULFATE 325(65) MG
325 TABLET ORAL 2 TIMES DAILY
Qty: 60 TABLET | Refills: 4 | Status: SHIPPED | OUTPATIENT
Start: 2023-10-26

## 2023-10-26 RX ORDER — HYDROCODONE BITARTRATE AND ACETAMINOPHEN 5; 325 MG/1; MG/1
1 TABLET ORAL EVERY 8 HOURS PRN
Qty: 60 TABLET | Refills: 0 | Status: SHIPPED | OUTPATIENT
Start: 2023-10-26

## 2023-10-26 NOTE — TELEPHONE ENCOUNTER
Lab orders needed prior to appt 11/2/23. I will call pt once orders are submitted. Informed pt needs labs Monday 10/30/23

## 2023-10-26 NOTE — PROGRESS NOTES
Chief Complaint  Chief Complaint   Patient presents with    [facility]     Hospital Follow Up Visit     Pt here for f/u of GI bleed    Herpes Zoster     Pt c/o lingering pain from shingles x 2 mon ago        Subjective    History of Present Illness        Beth Mckinney presents to [facility] for   History of Present Illness   HPI  The patient is a 68-year-old male who presents to [facility].    Shingles  The patient had shingles 2 months ago. He is experiencing pain on the side of his face that will not go away. He was diagnosed with postherpetic neuropathy. He saw an ophthalmologist for the pain in his eyes but was told his eyes would be fine. The pain is a persistent sharp and burning sensation. The two sides of his face have completely different feelings.    Hospitalization  The patient has been hospitalized 4 separate times in the past several weeks. He was put on Lyrica and Neurontin; however, it made him sick, and he was hallucinating. He does not want to go back to [facility]. He felt like the doctors at [place] were experimenting on him.  He will be going to [facility] from now on. He was in the ICU because he had a rupture in his stomach. The doctor repaired it and released him on 10/23/2023. He had no energy. He was given morphine in the hospital; however, it makes him sick. The only thing that helped him was the 5 mg dosage of hydrocodone. He has never done drugs in his life. He does not drink alcohol or smoke. He would like something to manage his pain until he can. He was prescribed gabapentin 600 mg 3 times daily, which he could not tolerate. He vomited as soon as he arrived home and went straight back to the hospital. He does not want to try gabapentin or Neurontin again. When he was taking a low dose of Neurontin, he was experiencing vomiting and diarrhea for 1 week.     HIV  The patient was referred here by a  at [facility].  "He went to [place] for a year and did not take the Biktarvy. He thinks that is why he got sick like this. He has been on it for about 2 months now.    Health maintenance  He would like to receive the Shingrix vaccine. He would like to get his influenza vaccine as well. He has been healthy throughout his whole life.    Hemoglobin  The patient's hemoglobin was low on his last blood test. He was told it was going up when he was released from the hospital. His hemoglobin was within normal limits on 09/2023. He has had to get 3 transfusions in his life. He was told by his surgeon that he had some tenderness when he was in surgery. He was in the ICU for 2 or 3 days, then was told to follow up in 6 weeks. He had to get a transfusion 20 years ago. He bought an iron supplement yesterday.    Wound care  The patient was released from the wound center today. He was told his wound was healed.      Objective   Vital Signs:   Visit Vitals  /68   Pulse 82   Temp 98.2 °F (36.8 °C)   Resp 20   Ht 188 cm (74.02\")   Wt 72.1 kg (159 lb)   SpO2 99%   BMI 20.40 kg/m²       BMI is within normal parameters. No other follow-up for BMI required.     Physical Exam  Vitals reviewed.   Constitutional:       Appearance: He is well-developed.   HENT:      Head: Normocephalic.      Right Ear: External ear normal.      Left Ear: External ear normal.      Nose: Nose normal.   Eyes:      Conjunctiva/sclera: Conjunctivae normal.   Cardiovascular:      Rate and Rhythm: Normal rate and regular rhythm.   Pulmonary:      Effort: Pulmonary effort is normal.      Breath sounds: Normal breath sounds.   Musculoskeletal:         General: Normal range of motion.      Cervical back: Normal range of motion and neck supple.   Skin:     General: Skin is warm and dry.      Capillary Refill: Capillary refill takes less than 2 seconds.   Neurological:      Mental Status: He is alert and oriented to person, place, and time.            Result Review :             "        Assessment and Plan      Diagnoses and all orders for this visit:    1. Neuralgia, post-herpetic (Primary)  Assessment & Plan:  Patient was prescribed hydrocodone to help treat his pain.  Patient was encouraged to return to clinic if her symptoms do not improve.    Orders:  -     HYDROcodone-acetaminophen (NORCO) 5-325 MG per tablet; Take 1 tablet by mouth Every 8 (Eight) Hours As Needed for Moderate Pain.  Dispense: 60 tablet; Refill: 0    2. Iron deficiency anemia, unspecified iron deficiency anemia type  Comments:  He will be prescribed ferrous sulfate 325 mg; take 1 tablet by mouth every 8 hours as needed.   The patient will have laboratory workup done on 10/30/2023.  Orders:  -     ferrous sulfate 325 (65 FE) MG tablet; Take 1 tablet by mouth 2 (Two) Times a Day.  Dispense: 60 tablet; Refill: 4  -     HYDROcodone-acetaminophen (NORCO) 5-325 MG per tablet; Take 1 tablet by mouth Every 8 (Eight) Hours As Needed for Moderate Pain.  Dispense: 60 tablet; Refill: 0             Follow Up   No follow-ups on file.    Patient was given instructions and counseling regarding his condition or for health maintenance advice. Please see specific information pulled into the AVS if appropriate.     Transcribed from ambient dictation for Nirav Donovan Sr, MD by Leidy Oilvo.  10/26/23   18:42 EDT    Patient or patient representative verbalized consent to the visit recording.  I have personally performed the services described in this document as transcribed by the above individual, and it is both accurate and complete.

## 2023-10-26 NOTE — TELEPHONE ENCOUNTER
"Hub staff attempted to follow warm transfer process and was unsuccessful     Caller: Beth Mckinney \"Col. Mckinney\"    Relationship to patient: Self    Best call back number: [phone]    Patient is needing: PATIENT JUST CALLED AND STATED HE LEFT YOUR OFFICE AND WENT TO THE PHARMACY. THE MEDICATION YOU WERE CALLING IN FOR PAIN AND IRON IS NOT AT PHARMACY [facility]. PATIENT STATES HE IS IN A LOT OF PAIN.  PLEASE CALL THIS IN [facility]! THANKS     "

## 2023-10-26 NOTE — PROGRESS NOTES
"Chief Complaint  Chief Complaint   Patient presents with    [facility]     Hospital Follow Up Visit     Pt here for f/u of GI bleed    Herpes Zoster     Pt c/o lingering pain from shingles x 2 mon ago        Subjective    History of Present Illness        Beth Mckinney presents to [facility] for   Herpes Zoster  This is a new problem. The current episode started 1 to 4 weeks ago. The problem has been gradually improving. Associated symptoms include fatigue and headaches. Pertinent negatives include no chest pain, chills, congestion, coughing, nausea, neck pain, visual change or vomiting. Associated symptoms comments: Patient on the left side.. Nothing aggravates the symptoms.        Objective   Vital Signs:   Visit Vitals  /68   Pulse 82   Temp 98.2 °F (36.8 °C)   Resp 20   Ht 188 cm (74.02\")   Wt 72.1 kg (159 lb)   SpO2 99%   BMI 20.40 kg/m²       BMI is within normal parameters. No other follow-up for BMI required.     Physical Exam  Vitals reviewed.   Constitutional:       Appearance: He is well-developed.   HENT:      Head: Normocephalic.      Right Ear: External ear normal.      Left Ear: External ear normal.      Nose: Nose normal.   Eyes:      Conjunctiva/sclera: Conjunctivae normal.   Cardiovascular:      Rate and Rhythm: Normal rate and regular rhythm.   Pulmonary:      Effort: Pulmonary effort is normal.      Breath sounds: Normal breath sounds.   Musculoskeletal:         General: Normal range of motion.      Cervical back: Normal range of motion and neck supple.   Skin:     General: Skin is warm and dry.      Capillary Refill: Capillary refill takes less than 2 seconds.   Neurological:      Mental Status: He is alert and oriented to person, place, and time.            Result Review :                    Assessment and Plan      Diagnoses and all orders for this visit:    1. Neuralgia, post-herpetic (Primary)  Assessment & Plan:  Patient was " prescribed hydrocodone to help treat his pain.  Patient was encouraged to return to clinic if her symptoms do not improve.    Orders:  -     HYDROcodone-acetaminophen (NORCO) 5-325 MG per tablet; Take 1 tablet by mouth Every 8 (Eight) Hours As Needed for Moderate Pain.  Dispense: 60 tablet; Refill: 0    2. Iron deficiency anemia, unspecified iron deficiency anemia type  Comments:  He will be prescribed ferrous sulfate 325 mg; take 1 tablet by mouth every 8 hours as needed.   The patient will have laboratory workup done on 10/30/2023.  Orders:  -     ferrous sulfate 325 (65 FE) MG tablet; Take 1 tablet by mouth 2 (Two) Times a Day.  Dispense: 60 tablet; Refill: 4  -     HYDROcodone-acetaminophen (NORCO) 5-325 MG per tablet; Take 1 tablet by mouth Every 8 (Eight) Hours As Needed for Moderate Pain.  Dispense: 60 tablet; Refill: 0             Follow Up   No follow-ups on file.  Patient was given instructions and counseling regarding his condition or for health maintenance advice. Please see specific information pulled into the AVS if appropriate.

## 2023-10-30 ENCOUNTER — LAB (OUTPATIENT)
Dept: LAB | Facility: HOSPITAL | Age: 68
End: 2023-10-30
Payer: MEDICARE

## 2023-10-30 PROCEDURE — 85025 COMPLETE CBC W/AUTO DIFF WBC: CPT | Performed by: FAMILY MEDICINE

## 2023-10-30 PROCEDURE — G0103 PSA SCREENING: HCPCS | Performed by: FAMILY MEDICINE

## 2023-10-30 PROCEDURE — 80053 COMPREHEN METABOLIC PANEL: CPT | Performed by: FAMILY MEDICINE

## 2023-10-30 PROCEDURE — 82043 UR ALBUMIN QUANTITATIVE: CPT | Performed by: FAMILY MEDICINE

## 2023-10-30 PROCEDURE — 83036 HEMOGLOBIN GLYCOSYLATED A1C: CPT | Performed by: FAMILY MEDICINE

## 2023-10-30 PROCEDURE — 80061 LIPID PANEL: CPT | Performed by: FAMILY MEDICINE

## 2023-10-30 PROCEDURE — 82570 ASSAY OF URINE CREATININE: CPT | Performed by: FAMILY MEDICINE

## 2023-10-30 PROCEDURE — 84443 ASSAY THYROID STIM HORMONE: CPT | Performed by: FAMILY MEDICINE

## 2023-11-01 ENCOUNTER — READMISSION MANAGEMENT (OUTPATIENT)
Dept: CALL CENTER | Facility: HOSPITAL | Age: 68
End: 2023-11-01
Payer: MEDICARE

## 2023-11-01 NOTE — OUTREACH NOTE
Medical Week 2 Survey      Flowsheet Row Responses   [facility] patient discharged from? [facility]   Does the patient have one of the following disease processes/diagnoses(primary or secondary)? Other   Week 2 attempt successful? Yes   Call start time 1137   Discharge diagnosis Acute blood loss anemia   Call end time 1140   Meds reviewed with patient/caregiver? Yes   Is the patient having any side effects they believe may be caused by any medication additions or changes? No   Does the patient have all medications ordered at discharge? Yes   Is the patient taking all medications as directed (includes completed medication regime)? Yes   Does the patient have a primary care provider?  Yes   Does the patient have an appointment with their PCP within 7 days of discharge? Yes   Comments regarding PCP PCP follow up 11/2/23   Has the patient kept scheduled appointments due by today? N/A   Has home health visited the patient within 72 hours of discharge? N/A   Psychosocial issues? No   Did the patient receive a copy of their discharge instructions? Yes   Nursing interventions Reviewed instructions with patient   What is the patient's perception of their health status since discharge? Same  [Patient still having head/facial pain related to shingles.]   If the patient is a current smoker, are they able to teach back resources for cessation? Not a smoker   Week 2 Call Completed? Yes   Is the patient interested in additional calls from an ambulatory ? No   Would this patient benefit from a Referral to [facility]? No   Call end time 1140            Leona OCONNELL - Registered Nurse

## 2023-11-02 ENCOUNTER — OFFICE VISIT (OUTPATIENT)
Dept: FAMILY MEDICINE CLINIC | Facility: CLINIC | Age: 68
End: 2023-11-02
Payer: MEDICARE

## 2023-11-02 VITALS
BODY MASS INDEX: 20.41 KG/M2 | HEIGHT: 74 IN | RESPIRATION RATE: 17 BRPM | HEART RATE: 97 BPM | SYSTOLIC BLOOD PRESSURE: 140 MMHG | WEIGHT: 159 LBS | DIASTOLIC BLOOD PRESSURE: 70 MMHG | OXYGEN SATURATION: 98 % | TEMPERATURE: 97.8 F

## 2023-11-02 DIAGNOSIS — D50.9 IRON DEFICIENCY ANEMIA, UNSPECIFIED IRON DEFICIENCY ANEMIA TYPE: ICD-10-CM

## 2023-11-02 DIAGNOSIS — B02.29 NEURALGIA, POST-HERPETIC: Primary | ICD-10-CM

## 2023-11-02 PROBLEM — I21.9 MYOCARDIAL INFARCTION: Status: ACTIVE | Noted: 2023-11-02

## 2023-11-02 PROBLEM — E11.9 DIABETES MELLITUS: Status: ACTIVE | Noted: 2023-11-02

## 2023-11-02 PROBLEM — D49.2: Status: ACTIVE | Noted: 2017-05-30

## 2023-11-02 PROBLEM — B30.9 ACUTE VIRAL CONJUNCTIVITIS OF LEFT EYE: Status: ACTIVE | Noted: 2023-09-02

## 2023-11-02 PROBLEM — S00.06XA INSECT BITE OF SCALP: Status: ACTIVE | Noted: 2023-09-02

## 2023-11-02 PROBLEM — R53.83 FATIGUE: Status: ACTIVE | Noted: 2017-05-15

## 2023-11-02 PROBLEM — H05.012 ORBITAL CELLULITIS ON LEFT: Status: ACTIVE | Noted: 2023-09-04

## 2023-11-02 PROBLEM — H54.8 LEGALLY BLIND: Status: ACTIVE | Noted: 2023-11-02
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
## 2023-11-02 PROCEDURE — 99213 OFFICE O/P EST LOW 20 MIN: CPT | Performed by: FAMILY MEDICINE

## 2023-11-02 PROCEDURE — 3044F HG A1C LEVEL LT 7.0%: CPT | Performed by: FAMILY MEDICINE

## 2023-11-02 RX ORDER — OXYCODONE AND ACETAMINOPHEN 7.5; 325 MG/1; MG/1
1 TABLET ORAL EVERY 8 HOURS PRN
Qty: 90 TABLET | Refills: 0 | Status: SHIPPED | OUTPATIENT
Start: 2023-11-02

## 2023-11-02 NOTE — PROGRESS NOTES
"Chief Complaint  Chief Complaint   Patient presents with    Labs Only     Pt here to discuss labs        Subjective    History of Present Illness        Beth Mckinney presents to [facility] for   History of Present Illness  The patient had shingles 2 months ago. He is experiencing pain on the side of his face that will not go away. He was diagnosed with postherpetic neuropathy. He saw an ophthalmologist for the pain in his eyes but was told his eyes would be fine. The pain is a persistent sharp and burning sensation. The two sides of his face have completely different feelings.  11/2/2023-  Patient still having pain from shingles.  He reports that hydrocodone has not been as helpful as the medication he had in the hospital.  He reports he was given oxycodone to help treat his symptoms.  Anemia  Presents for follow-up visit. Symptoms include malaise/fatigue. There has been no anorexia, bruising/bleeding easily, confusion, light-headedness, pallor, palpitations or weight loss. Signs of blood loss that are not present include hematemesis and hematochezia. There are no compliance problems.  Compliance with medications is %.        Objective   Vital Signs:   Visit Vitals  /70   Pulse 97   Temp 97.8 °F (36.6 °C)   Resp 17   Ht 188 cm (74.02\")   Wt 72.1 kg (159 lb)   SpO2 98%   BMI 20.40 kg/m²       BMI is within normal parameters. No other follow-up for BMI required.     Physical Exam  Vitals reviewed.   Constitutional:       Appearance: He is well-developed.   HENT:      Head: Normocephalic.      Right Ear: External ear normal.      Left Ear: External ear normal.      Nose: Nose normal.   Eyes:      Conjunctiva/sclera: Conjunctivae normal.   Cardiovascular:      Rate and Rhythm: Normal rate and regular rhythm.   Pulmonary:      Effort: Pulmonary effort is normal.      Breath sounds: Normal breath sounds.   Musculoskeletal:         General: Normal range of motion.      " Cervical back: Normal range of motion and neck supple.   Skin:     General: Skin is warm and dry.      Capillary Refill: Capillary refill takes less than 2 seconds.   Neurological:      Mental Status: He is alert and oriented to person, place, and time.            Result Review :                    Assessment and Plan      Diagnoses and all orders for this visit:    1. Neuralgia, post-herpetic (Primary)  Assessment & Plan:  Unchanged.  Patient was given a prescription for oxycodone to help treat his symptoms.  Patient was encouraged return to clinic in 1 month for follow-up.    Orders:  -     oxyCODONE-acetaminophen (PERCOCET) 7.5-325 MG per tablet; Take 1 tablet by mouth Every 8 (Eight) Hours As Needed for Severe Pain.  Dispense: 90 tablet; Refill: 0    2. Iron deficiency anemia, unspecified iron deficiency anemia type  Assessment & Plan:  Patient was advised to continue taking ferrous sulfate to help treat his symptoms.  Patient was encouraged to return to clinic in 1 month for follow-up.               Follow Up   No follow-ups on file.  Patient was given instructions and counseling regarding his condition or for health maintenance advice. Please see specific information pulled into the AVS if appropriate.       Answers submitted by the patient for this visit:  Primary Reason for Visit (Submitted on 10/27/2023)  What is the primary reason for your visit?: Other  Other (Submitted on 10/27/2023)  Please describe your symptoms.: Follow up visit pertaining to postherpectic  neuralgia!  Have you had these symptoms before?: Yes  How long have you been having these symptoms?: Greater than 2 weeks  Please list any medications you are currently taking for this condition.: HYDROCODONE 5-325, NO RELIEF WHATSOEVER!!  Please describe any probable cause for these symptoms. : Shingles 09/04/2023

## 2023-11-10 ENCOUNTER — READMISSION MANAGEMENT (OUTPATIENT)
Dept: CALL CENTER | Facility: HOSPITAL | Age: 68
End: 2023-11-10
Payer: MEDICARE

## 2023-11-10 NOTE — OUTREACH NOTE
Medical Week 3 Survey      Flowsheet Row Responses   [facility] patient discharged from? [facility]   Does the patient have one of the following disease processes/diagnoses(primary or secondary)? Other   Week 3 attempt successful? No  [Line disconnected twice after attempting call to patient.]   Call start time 1201   Unsuccessful attempts Attempt 1            Fartun Chapin Registered Nurse  
no

## 2023-11-14 ENCOUNTER — READMISSION MANAGEMENT (OUTPATIENT)
Dept: CALL CENTER | Facility: HOSPITAL | Age: 68
End: 2023-11-14
Payer: MEDICARE

## 2023-11-14 NOTE — OUTREACH NOTE
Medical Week 3 Survey      Flowsheet Row Responses   [facility] patient discharged from? [facility]   Does the patient have one of the following disease processes/diagnoses(primary or secondary)? Other   Week 3 attempt successful? Yes   Call start time 1558   Call end time 1600   Discharge diagnosis Acute blood loss anemia   Is the patient taking all medications as directed (includes completed medication regime)? Yes   Does the patient have a primary care provider?  Yes   Has the patient kept scheduled appointments due by today? Yes   Psychosocial issues? No   What is the patient's perception of their health status since discharge? Same   Is the patient/caregiver able to teach back signs and symptoms related to disease process for when to call PCP? Yes   Is the patient/caregiver able to teach back signs and symptoms related to disease process for when to call 911? Yes   Is the patient/caregiver able to teach back the hierarchy of who to call/visit for symptoms/problems? PCP, Specialist, Home health nurse, Urgent Care, ED, 911 Yes   If the patient is a current smoker, are they able to teach back resources for cessation? Not a smoker   Additional teach back comments States he is feeling about the same.  Has been to follow up appts.   Week 3 Call Completed? Yes   Graduated Yes   Graduated/Revoked comments Denies questions or needs.  States he is followingg up closely with his drs   Call end time 1600            Taylor ROSARIO - Licensed Nurse

## 2023-11-17 PROBLEM — D50.9 IRON DEFICIENCY ANEMIA: Status: ACTIVE | Noted: 2023-11-17

## 2023-11-18 NOTE — ASSESSMENT & PLAN NOTE
Patient was advised to continue taking ferrous sulfate to help treat his symptoms.  Patient was encouraged to return to clinic in 1 month for follow-up.

## 2023-11-18 NOTE — ASSESSMENT & PLAN NOTE
Unchanged.  Patient was given a prescription for oxycodone to help treat his symptoms.  Patient was encouraged return to clinic in 1 month for follow-up.

## 2023-11-18 NOTE — ASSESSMENT & PLAN NOTE
Hospital records reviewed.  Symptoms are improving since hospitalization.  Patient was prescribed hydrocodone to help treat his pain.  Patient was encouraged to return to clinic if her symptoms do not improve.

## 2023-12-04 ENCOUNTER — OFFICE VISIT (OUTPATIENT)
Dept: FAMILY MEDICINE CLINIC | Facility: CLINIC | Age: 68
End: 2023-12-04
Payer: MEDICARE

## 2023-12-04 VITALS
BODY MASS INDEX: 20.92 KG/M2 | RESPIRATION RATE: 18 BRPM | SYSTOLIC BLOOD PRESSURE: 140 MMHG | HEART RATE: 63 BPM | HEIGHT: 74 IN | DIASTOLIC BLOOD PRESSURE: 80 MMHG | WEIGHT: 163 LBS | TEMPERATURE: 97.5 F | OXYGEN SATURATION: 98 %

## 2023-12-04 DIAGNOSIS — D50.9 IRON DEFICIENCY ANEMIA, UNSPECIFIED IRON DEFICIENCY ANEMIA TYPE: Primary | ICD-10-CM

## 2023-12-04 DIAGNOSIS — B02.29 NEURALGIA, POST-HERPETIC: ICD-10-CM

## 2023-12-04 PROCEDURE — 3044F HG A1C LEVEL LT 7.0%: CPT | Performed by: FAMILY MEDICINE

## 2023-12-04 PROCEDURE — 99213 OFFICE O/P EST LOW 20 MIN: CPT | Performed by: FAMILY MEDICINE

## 2023-12-04 RX ORDER — OXYCODONE AND ACETAMINOPHEN 10; 325 MG/1; MG/1
1 TABLET ORAL EVERY 8 HOURS PRN
Qty: 90 TABLET | Refills: 0 | Status: SHIPPED | OUTPATIENT
Start: 2023-12-04

## 2023-12-11 ENCOUNTER — APPOINTMENT (OUTPATIENT)
Dept: GENERAL RADIOLOGY | Facility: HOSPITAL | Age: 68
End: 2023-12-11
Payer: MEDICARE

## 2023-12-11 ENCOUNTER — HOSPITAL ENCOUNTER (EMERGENCY)
Facility: HOSPITAL | Age: 68
Discharge: HOME | End: 2023-12-12
Attending: EMERGENCY MEDICINE | Admitting: EMERGENCY MEDICINE
Payer: MEDICARE

## 2023-12-11 ENCOUNTER — DOCUMENTATION (OUTPATIENT)
Dept: FAMILY MEDICINE CLINIC | Facility: CLINIC | Age: 68
End: 2023-12-11
Payer: MEDICARE

## 2023-12-11 DIAGNOSIS — R68.83 CHILLS: ICD-10-CM

## 2023-12-11 DIAGNOSIS — R07.9 CHEST PAIN, UNSPECIFIED TYPE: Primary | ICD-10-CM

## 2023-12-11 DIAGNOSIS — R53.1 WEAKNESS: ICD-10-CM

## 2023-12-11 LAB
ALBUMIN SERPL-MCNC: 3.7 G/DL (ref 3.5–5.2)
ALBUMIN/GLOB SERPL: 1.3 G/DL
ALP SERPL-CCNC: 42 U/L (ref 39–117)
ALT SERPL W P-5'-P-CCNC: 14 U/L (ref 1–41)
ANION GAP SERPL CALCULATED.3IONS-SCNC: 8 MMOL/L (ref 5–15)
AST SERPL-CCNC: 18 U/L (ref 1–40)
BASOPHILS # BLD AUTO: 0.04 10*3/MM3 (ref 0–0.2)
BASOPHILS NFR BLD AUTO: 0.5 % (ref 0–1.5)
BILIRUB SERPL-MCNC: 0.2 MG/DL (ref 0–1.2)
BUN SERPL-MCNC: 48 MG/DL (ref 8–23)
BUN/CREAT SERPL: 55.8 (ref 7–25)
CALCIUM SPEC-SCNC: 9 MG/DL (ref 8.6–10.5)
CHLORIDE SERPL-SCNC: 106 MMOL/L (ref 98–107)
CO2 SERPL-SCNC: 27 MMOL/L (ref 22–29)
CREAT SERPL-MCNC: 0.86 MG/DL (ref 0.76–1.27)
DEPRECATED RDW RBC AUTO: 47.2 FL (ref 37–54)
EGFRCR SERPLBLD CKD-EPI 2021: 95.5 ML/MIN/1.73
EOSINOPHIL # BLD AUTO: 0.11 10*3/MM3 (ref 0–0.4)
EOSINOPHIL NFR BLD AUTO: 1.3 % (ref 0.3–6.2)
ERYTHROCYTE [DISTWIDTH] IN BLOOD BY AUTOMATED COUNT: 14.2 % (ref 12.3–15.4)
GLOBULIN UR ELPH-MCNC: 2.8 GM/DL
GLUCOSE SERPL-MCNC: 114 MG/DL (ref 65–99)
HCT VFR BLD AUTO: 30.4 % (ref 37.5–51)
HGB BLD-MCNC: 9.6 G/DL (ref 13–17.7)
HOLD SPECIMEN: NORMAL
HOLD SPECIMEN: NORMAL
IMM GRANULOCYTES # BLD AUTO: 0.02 10*3/MM3 (ref 0–0.05)
IMM GRANULOCYTES NFR BLD AUTO: 0.2 % (ref 0–0.5)
LYMPHOCYTES # BLD AUTO: 2.9 10*3/MM3 (ref 0.7–3.1)
LYMPHOCYTES NFR BLD AUTO: 33.6 % (ref 19.6–45.3)
MCH RBC QN AUTO: 28.8 PG (ref 26.6–33)
MCHC RBC AUTO-ENTMCNC: 31.6 G/DL (ref 31.5–35.7)
MCV RBC AUTO: 91.3 FL (ref 79–97)
MONOCYTES # BLD AUTO: 0.86 10*3/MM3 (ref 0.1–0.9)
MONOCYTES NFR BLD AUTO: 10 % (ref 5–12)
NEUTROPHILS NFR BLD AUTO: 4.7 10*3/MM3 (ref 1.7–7)
NEUTROPHILS NFR BLD AUTO: 54.4 % (ref 42.7–76)
NRBC BLD AUTO-RTO: 0 /100 WBC (ref 0–0.2)
PLATELET # BLD AUTO: 173 10*3/MM3 (ref 140–450)
PMV BLD AUTO: 11.4 FL (ref 6–12)
POTASSIUM SERPL-SCNC: 4 MMOL/L (ref 3.5–5.2)
PROT SERPL-MCNC: 6.5 G/DL (ref 6–8.5)
RBC # BLD AUTO: 3.33 10*6/MM3 (ref 4.14–5.8)
SODIUM SERPL-SCNC: 141 MMOL/L (ref 136–145)
TROPONIN T SERPL HS-MCNC: 30 NG/L
WBC NRBC COR # BLD AUTO: 8.63 10*3/MM3 (ref 3.4–10.8)
WHOLE BLOOD HOLD COAG: NORMAL
WHOLE BLOOD HOLD SPECIMEN: NORMAL

## 2023-12-11 PROCEDURE — 84484 ASSAY OF TROPONIN QUANT: CPT

## 2023-12-11 PROCEDURE — 99284 EMERGENCY DEPT VISIT MOD MDM: CPT

## 2023-12-11 PROCEDURE — 93005 ELECTROCARDIOGRAM TRACING: CPT | Performed by: EMERGENCY MEDICINE

## 2023-12-11 PROCEDURE — 36415 COLL VENOUS BLD VENIPUNCTURE: CPT

## 2023-12-11 PROCEDURE — 80053 COMPREHEN METABOLIC PANEL: CPT

## 2023-12-11 PROCEDURE — 93005 ELECTROCARDIOGRAM TRACING: CPT

## 2023-12-11 PROCEDURE — 85025 COMPLETE CBC W/AUTO DIFF WBC: CPT

## 2023-12-11 PROCEDURE — 71045 X-RAY EXAM CHEST 1 VIEW: CPT

## 2023-12-11 RX ORDER — SODIUM CHLORIDE 0.9 % (FLUSH) 0.9 %
10 SYRINGE (ML) INJECTION AS NEEDED
Status: DISCONTINUED | OUTPATIENT
Start: 2023-12-11 | End: 2023-12-12 | Stop reason: HOSPADM

## 2023-12-11 RX ORDER — ASPIRIN 325 MG
325 TABLET ORAL ONCE
Status: COMPLETED | OUTPATIENT
Start: 2023-12-11 | End: 2023-12-11

## 2023-12-11 RX ADMIN — ASPIRIN 325 MG: 325 TABLET ORAL at 23:17

## 2023-12-12 ENCOUNTER — TELEPHONE (OUTPATIENT)
Dept: FAMILY MEDICINE CLINIC | Facility: CLINIC | Age: 68
End: 2023-12-12

## 2023-12-12 VITALS
RESPIRATION RATE: 18 BRPM | SYSTOLIC BLOOD PRESSURE: 137 MMHG | WEIGHT: 175 LBS | HEART RATE: 106 BPM | HEIGHT: 74 IN | DIASTOLIC BLOOD PRESSURE: 67 MMHG | OXYGEN SATURATION: 100 % | BODY MASS INDEX: 22.46 KG/M2 | TEMPERATURE: 98.6 F

## 2023-12-12 LAB
GEN 5 2HR TROPONIN T REFLEX: 25 NG/L
TROPONIN T DELTA: -5 NG/L

## 2023-12-12 PROCEDURE — 84484 ASSAY OF TROPONIN QUANT: CPT | Performed by: EMERGENCY MEDICINE

## 2023-12-12 RX ORDER — OXYCODONE HYDROCHLORIDE AND ACETAMINOPHEN 5; 325 MG/1; MG/1
1 TABLET ORAL ONCE
Status: COMPLETED | OUTPATIENT
Start: 2023-12-12 | End: 2023-12-12

## 2023-12-12 RX ADMIN — OXYCODONE AND ACETAMINOPHEN 1 TABLET: 5; 325 TABLET ORAL at 00:46

## 2023-12-12 NOTE — DISCHARGE INSTRUCTIONS
Please return to the emergency room for any worsening pain, fevers, chills, weakness, dizziness, difficulties breathing or any other concerns.  You must follow-up with your PCP for repeat evaluation and further outpatient testing such as a cardiac stress test, as we discussed.

## 2023-12-12 NOTE — ED PROVIDER NOTES
" EMERGENCY DEPARTMENT ENCOUNTER    Room Number:  13/13  Date seen:  12/12/2023  PCP: Nirav Donovan Sr., MD  Historian(s): Patient      HPI:  Chief Complaint: Chest pain, shortness of breath, weakness  A complete HPI/ROS/PMH/PSH/SH/FH are unobtainable / limited due to: None  Context: Beth Mckinney is a 68 y.o. male who presents to the ED c/o new onset chest pain with some shortness of breath and weakness that began around 6 PM this evening when he woke up from a nap.  Patient says he felt pretty well earlier in the day.  When he woke up, however, he had some shortness of breath and generalized weakness and felt like he could not get up and do his normal activities.  He called his sister to his house and she helped him out for little while.  However he continued to feel bad and had some chills so he came here for further evaluation.  He expresses concern because the last time he had \"chills like this\" he ended up having shingles and that was several months ago.\"  He tells me he is still struggling with postherpetic neuralgia symptoms from the shingles diagnosis.        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    Left rotator cuff tear 10/02/2015    Legally blind 11/02/2023    Low back pain 09/29/2014    MRSA (methicillin resistant Staphylococcus aureus) infection 05/26/2013    Myocardial infarction 11/02/2023    Neoplasm of soft tissue of lower extremity 05/30/2017    Orbital cellulitis on left " 2023    Osteoarthritis of left knee 2013    Neuralgia, post-herpetic 2023    Rheumatoid arthritis 2013    Rotator cuff tear arthropathy of both shoulders 2013    Stroke 10/01/2012    Ulcer of lower limb 2013    Osteoarthritis of right knee 2013    Iron deficiency anemia 2023     Resolved Ambulatory Problems     Diagnosis Date Noted    Acute upper GI bleed 10/19/2023     Past Medical History:   Diagnosis Date    Arthritis     Colon cancer          PAST SURGICAL HISTORY  Past Surgical History:   Procedure Laterality Date    COLONOSCOPY  2016    ENDOSCOPY N/A 10/20/2023    Procedure: ESOPHAGOGASTRODUODENOSCOPY AT BEDSIDE with epi injection;  Surgeon: Guero Lake MD;  Location: [facility] ENDOSCOPY;  Service: Gastroenterology;  Laterality: N/A;  pre: GI bleed  post: esophagitis with dimas bartlett tear    FEMUR SURGERY      JOINT REPLACEMENT      TONSILLECTOMY  1969         FAMILY HISTORY  Family History   Problem Relation Age of Onset    No Known Problems Mother     Diabetes Father             Arthritis Maternal Grandmother                  SOCIAL HISTORY  Social History     Socioeconomic History    Marital status:    Tobacco Use    Smoking status: Never     Passive exposure: Never   Vaping Use    Vaping Use: Never used   Substance and Sexual Activity    Alcohol use: Never     Comment: SELDOM    Drug use: Never    Sexual activity: Not Currently     Birth control/protection: Abstinence         ALLERGIES  Tree nut, Penicillins, and Sulfa antibiotics        REVIEW OF SYSTEMS  Review of Systems   Constitutional:  Positive for chills. Negative for activity change and fever.   HENT: Negative.     Eyes:  Negative for pain and visual disturbance.   Respiratory:  Positive for shortness of breath. Negative for cough.    Cardiovascular:  Positive for chest pain.   Gastrointestinal:  Negative for abdominal pain.   Genitourinary:  Negative for dysuria.    Skin:  Negative for color change.   Neurological:  Positive for weakness. Negative for syncope and headaches.   All other systems reviewed and are negative.           PHYSICAL EXAM  ED Triage Vitals [12/11/23 2204]   Temp Heart Rate Resp BP SpO2   98.6 °F (37 °C) 106 18 162/93 100 %      Temp src Heart Rate Source Patient Position BP Location FiO2 (%)   Tympanic -- -- -- --       Physical Exam      GENERAL: No diaphoresis, no acute distress  HENT: nares patent, normocephalic and atraumatic  EYES: no scleral icterus, EOMI, normal conjunctivae  CV: regular rhythm, normal rate, normal distal pulses, no murmurs  RESPIRATORY: normal effort, no stridor, lungs clear to auscultation bilaterally  ABDOMEN: soft, nontender in all quadrants  MUSCULOSKELETAL: no deformity, no asymmetry  NEURO: alert, moves all extremities, follows commands  PSYCH:  calm, cooperative  SKIN: warm, dry    Vital signs and nursing notes reviewed.        LAB RESULTS  Recent Results (from the past 24 hour(s))   ECG 12 Lead ED Triage Standing Order; Chest Pain    Collection Time: 12/11/23  9:56 PM   Result Value Ref Range    QT Interval 304 ms    QTC Interval 400 ms   Comprehensive Metabolic Panel    Collection Time: 12/11/23 10:03 PM    Specimen: Arm, Left; Blood   Result Value Ref Range    Glucose 114 (H) 65 - 99 mg/dL    BUN 48 (H) 8 - 23 mg/dL    Creatinine 0.86 0.76 - 1.27 mg/dL    Sodium 141 136 - 145 mmol/L    Potassium 4.0 3.5 - 5.2 mmol/L    Chloride 106 98 - 107 mmol/L    CO2 27.0 22.0 - 29.0 mmol/L    Calcium 9.0 8.6 - 10.5 mg/dL    Total Protein 6.5 6.0 - 8.5 g/dL    Albumin 3.7 3.5 - 5.2 g/dL    ALT (SGPT) 14 1 - 41 U/L    AST (SGOT) 18 1 - 40 U/L    Alkaline Phosphatase 42 39 - 117 U/L    Total Bilirubin 0.2 0.0 - 1.2 mg/dL    Globulin 2.8 gm/dL    A/G Ratio 1.3 g/dL    BUN/Creatinine Ratio 55.8 (H) 7.0 - 25.0    Anion Gap 8.0 5.0 - 15.0 mmol/L    eGFR 95.5 >60.0 mL/min/1.73   High Sensitivity Troponin T    Collection Time: 12/11/23  10:03 PM    Specimen: Arm, Left; Blood   Result Value Ref Range    HS Troponin T 30 (H) <22 ng/L   Green Top (Gel)    Collection Time: 12/11/23 10:03 PM   Result Value Ref Range    Extra Tube Hold for add-ons.    Lavender Top    Collection Time: 12/11/23 10:03 PM   Result Value Ref Range    Extra Tube hold for add-on    Gold Top - SST    Collection Time: 12/11/23 10:03 PM   Result Value Ref Range    Extra Tube Hold for add-ons.    Light Blue Top    Collection Time: 12/11/23 10:03 PM   Result Value Ref Range    Extra Tube Hold for add-ons.    CBC Auto Differential    Collection Time: 12/11/23 10:03 PM    Specimen: Arm, Left; Blood   Result Value Ref Range    WBC 8.63 3.40 - 10.80 10*3/mm3    RBC 3.33 (L) 4.14 - 5.80 10*6/mm3    Hemoglobin 9.6 (L) 13.0 - 17.7 g/dL    Hematocrit 30.4 (L) 37.5 - 51.0 %    MCV 91.3 79.0 - 97.0 fL    MCH 28.8 26.6 - 33.0 pg    MCHC 31.6 31.5 - 35.7 g/dL    RDW 14.2 12.3 - 15.4 %    RDW-SD 47.2 37.0 - 54.0 fl    MPV 11.4 6.0 - 12.0 fL    Platelets 173 140 - 450 10*3/mm3    Neutrophil % 54.4 42.7 - 76.0 %    Lymphocyte % 33.6 19.6 - 45.3 %    Monocyte % 10.0 5.0 - 12.0 %    Eosinophil % 1.3 0.3 - 6.2 %    Basophil % 0.5 0.0 - 1.5 %    Immature Grans % 0.2 0.0 - 0.5 %    Neutrophils, Absolute 4.70 1.70 - 7.00 10*3/mm3    Lymphocytes, Absolute 2.90 0.70 - 3.10 10*3/mm3    Monocytes, Absolute 0.86 0.10 - 0.90 10*3/mm3    Eosinophils, Absolute 0.11 0.00 - 0.40 10*3/mm3    Basophils, Absolute 0.04 0.00 - 0.20 10*3/mm3    Immature Grans, Absolute 0.02 0.00 - 0.05 10*3/mm3    nRBC 0.0 0.0 - 0.2 /100 WBC   High Sensitivity Troponin T 2Hr    Collection Time: 12/12/23 12:22 AM    Specimen: Arm, Right; Blood   Result Value Ref Range    HS Troponin T 25 (H) <22 ng/L    Troponin T Delta -5 (L) >=-4 - <+4 ng/L       Ordered the above labs and reviewed the results.        RADIOLOGY  XR Chest 1 View    Result Date: 12/11/2023  SINGLE VIEW OF THE CHEST  HISTORY: Chest pain  COMPARISON: October 19, 2023   FINDINGS: Heart size is within normal limits. No pneumothorax, pleural effusion, or acute infiltrate is seen. There is mild elevation of the right hemidiaphragm, with associated bronchovascular crowding.      No acute findings.  This report was finalized on 12/11/2023 11:16 PM by Dr. Karuna Browne M.D on Workstation: [id number]       Ordered the above noted radiological studies. Reviewed by me in PACS.          PROCEDURES  Procedures        MEDICATIONS GIVEN IN ER  Medications   sodium chloride 0.9 % flush 10 mL (has no administration in time range)   aspirin tablet 325 mg (325 mg Oral Given 12/11/23 6556)   oxyCODONE-acetaminophen (PERCOCET) 5-325 MG per tablet 1 tablet (1 tablet Oral Given 12/12/23 0046)           MEDICAL DECISION MAKING, PROGRESS, and CONSULTS    All labs have been independently reviewed by me.  All radiology studies have been reviewed by me and I have also reviewed the radiology report.   EKG's independently viewed and interpreted by me.  Discussion below represents my analysis of pertinent findings related to patient's condition, differential diagnosis, treatment plan and final disposition.    Heart score: 3    Additional sources:    - External (non-ED) record review: I reviewed the most recent hospital discharge summary from October 22, 2023 when patient was admitted for acute blood loss anemia and upper GI bleed from Sarita-Centeno tear.  He had some hypotensive.  With his anemia.    - Chronic or social conditions impacting care: Immunocompromised with history of HIV, lives alone in his residence.          Orders placed during this visit:  Orders Placed This Encounter   Procedures    XR Chest 1 View    [place] Draw    Comprehensive Metabolic Panel    High Sensitivity Troponin T    CBC Auto Differential    High Sensitivity Troponin T 2Hr    NPO Diet NPO Type: Strict NPO    Undress & Gown    Continuous Pulse Oximetry    Oxygen Therapy- Nasal Cannula; Titrate 1-6 LPM Per SpO2; 90 - 95%     "ECG 12 Lead ED Triage Standing Order; Chest Pain    ECG 12 Lead ED Triage Standing Order; Chest Pain    Insert Peripheral IV    CBC & Differential    Green Top (Gel)    Lavender Top    Gold Top - SST    Light Blue Top           Differential diagnosis includes but is not limited to:    Acute MI, pneumonia, viral illness, bronchitis, pulmonary embolism, aortic dissection, GERD      Independent interpretation of labs, radiology studies, and discussions with consultants:  ED Course as of 12/12/23 0111   Mon Dec 11, 2023   2339 Hemoglobin(!): 9.6 [SUREKHA]   2339 Hematocrit(!): 30.4 [SUREKHA]   2347 I independently interpreted the Chest X-ray and my findings are: No Pneumothorax, No Effusion, No Infiltrate     [SUREKHA]   2348 EKG           EKG time/Interp time: 2156/2335  Rhythm/Rate: Sinus rhythm, 104 bpm  P waves and WA: Present, 194 ms  QRS, axis: 87 ms, normal axis  ST and T waves: No ST segment elevations are present.    Independently interpreted by me contemporaneously with treatment     [SUREKHA]   2352 Currently, patient tells me that he has no chest pain.  He says that his chest pain stopped when he arrived here.  During her interview, he expresses to me that his greater concern is whether or not he has \"recurrence of shingles\" because the chills that he experienced night are similar to what he remembers from when coming down with shingles previously.  He denies any chest pain or shortness of breath right now.  EKG is nonworrisome.  He tells me that he does not want to stay tonight.  I have just advised him that at the very least I want to get a second troponin for comparison sake.  Will continue to monitor him carefully for now.  He is agreeable with this plan. [SUREKHA]   2353 Hemoglobin is stable [SUREKHA]   Tue Dec 12, 2023   0104 Troponin T Delta(!): -5 [SUREKHA]   0109 HS Troponin T(!): 25 [SUREKHA]   0110 I just reassessed the patient.  He is resting comfortably.  He says he has no pain at all.  Work of breathing is totally normal.  He insist " "that he would like to go home now.  He tells me he has an appointment to follow-up with his PCP, Dr. Donovan, tomorrow.  I think that is totally reasonable at this point in time.  His presentation is very atypical for acute cardiac chest pain etiology.  Rather I suspect he may be coming down with a viral illness given his description of symptoms and his immunocompromise state.  I encouraged him to stick with typical symptomatic management measures for now and I carefully reviewed with him the usual \"return to ER\" instructions for any worsening signs or symptoms.  I explained that he may benefit from an outpatient cardiac stress test and that he would need to discuss that with his PCP tomorrow.  He agreed to do so. [SUREKHA]      ED Course User Index  [SUREKHA] Tanner Castle MD         DIAGNOSIS  Final diagnoses:   Chest pain, unspecified type   Chills   Weakness         DISPOSITION  Discharge home        Latest Documented Vital Signs:  As of 01:11 EST  BP- 162/93 HR- 106 Temp- 98.6 °F (37 °C) (Tympanic) O2 sat- 100%              --    Please note that portions of this were completed with a voice recognition program.       Note Disclaimer: At [facility], we believe that sharing information builds trust and better relationships. You are receiving this note because you are receiving care at [facility] or recently visited. It is possible you will see health information before a provider has talked with you about it. This kind of information can be easy to misunderstand. To help you fully understand what it means for your health, we urge you to discuss this note with your provider.             Tanner Castle MD  12/12/23 0111    "

## 2023-12-12 NOTE — TELEPHONE ENCOUNTER
"  Caller: Beth Mckinney \"Col. Mckinney\"    Relationship: Self    Best call back number: [phone]     Who are you requesting to speak with (clinical staff, provider,  specific staff member): DR. SEGURA     What was the call regarding: PATIENT IS REQUESTING AN URGENT CALL BACK. PATIENT STATED HE HAS SPENT THE NIGHT IN THE ER AND WAS RELEASED AT ABOUT 4:00AM THIS MORNING. PATIENT STATED THAT HE IS UNABLE TO COME IN AND IS NEEDING TO TALK WITH DR SEGURA URGENTLY. PLEASE ADVISE.       "

## 2023-12-12 NOTE — PROGRESS NOTES
Received a urgent call from patient stating that he is having trouble trouble breathing and also dizzy .  He is having similar same symptoms as he had 3 months ago when he received a blood transfusion.    I advised patient to call 911 and go to ER

## 2023-12-13 ENCOUNTER — TELEPHONE (OUTPATIENT)
Dept: FAMILY MEDICINE CLINIC | Facility: CLINIC | Age: 68
End: 2023-12-13

## 2023-12-13 LAB
QT INTERVAL: 304 MS
QTC INTERVAL: 400 MS

## 2023-12-13 NOTE — TELEPHONE ENCOUNTER
"    Hub staff attempted to follow warm transfer process and was unsuccessful     Caller: Beth Mckinney \"Col. Mckinney\"    Relationship to patient: Self    Best call back number: [phone]     Patient is needing:     PATIENT NEEDS A HOSPITAL FOLLOW UP APPOINTMENT.  HE IS HAVING DIFFICULTY BREATHING WHEN MOVING AROUND.  HE IS WANTING TO BE SEEN ASAP    "
negative...

## 2023-12-14 ENCOUNTER — OFFICE VISIT (OUTPATIENT)
Dept: FAMILY MEDICINE CLINIC | Facility: CLINIC | Age: 68
End: 2023-12-14
Payer: MEDICARE

## 2023-12-14 VITALS
DIASTOLIC BLOOD PRESSURE: 57 MMHG | BODY MASS INDEX: 22.47 KG/M2 | OXYGEN SATURATION: 100 % | HEIGHT: 74 IN | SYSTOLIC BLOOD PRESSURE: 118 MMHG | HEART RATE: 87 BPM

## 2023-12-14 DIAGNOSIS — R06.02 SHORTNESS OF BREATH: Primary | ICD-10-CM

## 2023-12-14 PROCEDURE — 3044F HG A1C LEVEL LT 7.0%: CPT | Performed by: FAMILY MEDICINE

## 2023-12-14 PROCEDURE — 99213 OFFICE O/P EST LOW 20 MIN: CPT | Performed by: FAMILY MEDICINE

## 2023-12-14 RX ORDER — AZITHROMYCIN 250 MG/1
TABLET, FILM COATED ORAL
Qty: 6 TABLET | Refills: 0 | Status: SHIPPED | OUTPATIENT
Start: 2023-12-14

## 2023-12-14 RX ORDER — PREDNISONE 20 MG/1
40 TABLET ORAL DAILY
Qty: 10 TABLET | Refills: 0 | Status: SHIPPED | OUTPATIENT
Start: 2023-12-14 | End: 2023-12-19

## 2023-12-14 RX ORDER — ALBUTEROL SULFATE 90 UG/1
2 AEROSOL, METERED RESPIRATORY (INHALATION) EVERY 4 HOURS PRN
Qty: 18 G | Refills: 2 | Status: SHIPPED | OUTPATIENT
Start: 2023-12-14

## 2023-12-14 NOTE — PROGRESS NOTES
"Chief Complaint  Chief Complaint   Patient presents with    Shortness of Breath     Can't breathe when standing up and moving around. Will get dizzy and nauseas.         Subjective    History of Present Illness        Beth Mckinney presents to [facility] for   Shortness of Breath  This is a recurrent problem. The current episode started in the past 7 days. The problem occurs intermittently. The problem has been gradually worsening. Associated symptoms include claudication, coryza, headaches, leg pain, neck pain and rhinorrhea. Pertinent negatives include no abdominal pain, chest pain, ear pain, fever, hemoptysis, leg swelling, orthopnea, PND, rash, sore throat, sputum production, swollen glands, syncope, vomiting or wheezing. Nothing aggravates the symptoms.        Objective   Vital Signs:   Visit Vitals  /57 (BP Location: Right arm, Patient Position: Sitting, Cuff Size: Adult)   Pulse 87   Ht 188 cm (74\")   SpO2 100%   BMI 22.47 kg/m²       BMI is within normal parameters. No other follow-up for BMI required.     Physical Exam  Vitals reviewed.   Constitutional:       Appearance: He is well-developed.   HENT:      Head: Normocephalic.      Right Ear: External ear normal.      Left Ear: External ear normal.      Nose: Nose normal.   Eyes:      Conjunctiva/sclera: Conjunctivae normal.   Cardiovascular:      Rate and Rhythm: Normal rate and regular rhythm.   Pulmonary:      Effort: Pulmonary effort is normal.      Breath sounds: Normal breath sounds.   Musculoskeletal:         General: Normal range of motion.      Cervical back: Normal range of motion and neck supple.   Skin:     General: Skin is warm and dry.      Capillary Refill: Capillary refill takes less than 2 seconds.   Neurological:      Mental Status: He is alert and oriented to person, place, and time.            Result Review :                    Assessment and Plan      Diagnoses and all orders for this " visit:    1. Shortness of breath (Primary)  Assessment & Plan:  Due to his symptoms he was started on Z-Norman, prednisone and albuterol.  Patient was encouraged to return to clinic if symptoms do not improve.  Patient is encouraged to consider pulmonology referral if symptoms worsen.    Orders:  -     azithromycin (ZITHROMAX) 250 MG tablet; Take 2 tablets the first day, then 1 tablet daily for 4 days.  Dispense: 6 tablet; Refill: 0  -     predniSONE (DELTASONE) 20 MG tablet; Take 2 tablets by mouth Daily for 5 days.  Dispense: 10 tablet; Refill: 0  -     albuterol sulfate  (90 Base) MCG/ACT inhaler; Inhale 2 puffs Every 4 (Four) Hours As Needed for Wheezing.  Dispense: 18 g; Refill: 2             Follow Up   No follow-ups on file.  Patient was given instructions and counseling regarding his condition or for health maintenance advice. Please see specific information pulled into the AVS if appropriate.       Answers submitted by the patient for this visit:  Primary Reason for Visit (Submitted on 12/13/2023)  What is the primary reason for your visit?: Shortness of Breath

## 2023-12-18 NOTE — PROGRESS NOTES
"Chief Complaint  Chief Complaint   Patient presents with    Follow-up     Pt here for 1 mon f/u    Fatigue     Pt c/o being fatigue        Subjective    History of Present Illness        Beth Mckinney presents to [facility] for   History of Present Illness  The patient had shingles 2 months ago. He is experiencing pain on the side of his face that will not go away. He was diagnosed with postherpetic neuropathy. He saw an ophthalmologist for the pain in his eyes but was told his eyes would be fine. The pain is a persistent sharp and burning sensation. The two sides of his face have completely different feelings.  11/2/2023-  Patient still having pain from shingles.  He reports that hydrocodone has not been as helpful as the medication he had in the hospital.  He reports he was given oxycodone to help treat his symptoms.  12/4/2023-  Again patient continues to have pain from shingles.  He reports that the hydrocodone is a little more helpful  Fatigue  This is a recurrent problem. The current episode started more than 1 month ago. Associated symptoms include fatigue. Pertinent negatives include no anorexia, change in bowel habit, chills, congestion, joint swelling, nausea, neck pain, swollen glands, urinary symptoms, vertigo or visual change. The treatment provided no relief.   Anemia  There has been no anorexia.        Objective   Vital Signs:   Visit Vitals  /80   Pulse 63   Temp 97.5 °F (36.4 °C)   Resp 18   Ht 188 cm (74.02\")   Wt 73.9 kg (163 lb)   SpO2 98%   BMI 20.92 kg/m²       BMI is within normal parameters. No other follow-up for BMI required.     Physical Exam  Vitals reviewed.   Constitutional:       Appearance: He is well-developed.   HENT:      Head: Normocephalic.      Right Ear: External ear normal.      Left Ear: External ear normal.      Nose: Nose normal.   Eyes:      Conjunctiva/sclera: Conjunctivae normal.   Cardiovascular:      Rate and Rhythm: " Normal rate and regular rhythm.   Pulmonary:      Effort: Pulmonary effort is normal.      Breath sounds: Normal breath sounds.   Musculoskeletal:         General: Normal range of motion.      Cervical back: Normal range of motion and neck supple.   Skin:     General: Skin is warm and dry.      Capillary Refill: Capillary refill takes less than 2 seconds.   Neurological:      Mental Status: He is alert and oriented to person, place, and time.            Result Review :                    Assessment and Plan      Diagnoses and all orders for this visit:    1. Iron deficiency anemia, unspecified iron deficiency anemia type (Primary)  Assessment & Plan:  CBC ordered.  Patient was advised to continue taking iron tablets.    Orders:  -     CBC & Differential    2. Neuralgia, post-herpetic  Assessment & Plan:  Patient was given an increase in his medication of oxycodone 10 mg to help treat his symptoms.    Orders:  -     oxyCODONE-acetaminophen (PERCOCET)  MG per tablet; Take 1 tablet by mouth Every 8 (Eight) Hours As Needed for Moderate Pain.  Dispense: 90 tablet; Refill: 0             Follow Up   No follow-ups on file.  Patient was given instructions and counseling regarding his condition or for health maintenance advice. Please see specific information pulled into the AVS if appropriate.       Answers submitted by the patient for this visit:  Primary Reason for Visit (Submitted on 12/4/2023)  What is the primary reason for your visit?: Other  Other (Submitted on 12/4/2023)  Please describe your symptoms.: PAIN!!  Have you had these symptoms before?: Yes  How long have you been having these symptoms?: Greater than 2 weeks

## 2023-12-28 PROBLEM — R06.02 SHORTNESS OF BREATH: Status: ACTIVE | Noted: 2023-12-28

## 2023-12-28 NOTE — ASSESSMENT & PLAN NOTE
Due to his symptoms he was started on Z-Norman, prednisone and albuterol.  Patient was encouraged to return to clinic if symptoms do not improve.  Patient is encouraged to consider pulmonology referral if symptoms worsen.

## 2024-01-05 ENCOUNTER — LAB (OUTPATIENT)
Dept: LAB | Facility: HOSPITAL | Age: 69
End: 2024-01-05
Payer: MEDICARE

## 2024-01-05 ENCOUNTER — TELEPHONE (OUTPATIENT)
Dept: FAMILY MEDICINE CLINIC | Facility: CLINIC | Age: 69
End: 2024-01-05

## 2024-01-05 ENCOUNTER — OFFICE VISIT (OUTPATIENT)
Dept: FAMILY MEDICINE CLINIC | Facility: CLINIC | Age: 69
End: 2024-01-05
Payer: MEDICARE

## 2024-01-05 ENCOUNTER — HOSPITAL ENCOUNTER (OUTPATIENT)
Facility: HOSPITAL | Age: 69
Setting detail: OBSERVATION
Discharge: LEFT BEFORE DISCHARGE | End: 2024-01-06
Attending: EMERGENCY MEDICINE | Admitting: INTERNAL MEDICINE
Payer: MEDICARE

## 2024-01-05 VITALS
SYSTOLIC BLOOD PRESSURE: 130 MMHG | TEMPERATURE: 97.7 F | OXYGEN SATURATION: 100 % | RESPIRATION RATE: 20 BRPM | HEIGHT: 74 IN | WEIGHT: 169 LBS | DIASTOLIC BLOOD PRESSURE: 60 MMHG | HEART RATE: 98 BPM | BODY MASS INDEX: 21.69 KG/M2

## 2024-01-05 DIAGNOSIS — D50.9 IRON DEFICIENCY ANEMIA, UNSPECIFIED IRON DEFICIENCY ANEMIA TYPE: ICD-10-CM

## 2024-01-05 DIAGNOSIS — R06.02 SHORTNESS OF BREATH: Primary | ICD-10-CM

## 2024-01-05 DIAGNOSIS — B02.29 NEURALGIA, POST-HERPETIC: ICD-10-CM

## 2024-01-05 DIAGNOSIS — D64.9 ANEMIA, UNSPECIFIED TYPE: Primary | ICD-10-CM

## 2024-01-05 LAB
ABO GROUP BLD: NORMAL
ALBUMIN SERPL-MCNC: 3.9 G/DL (ref 3.5–5.2)
ALBUMIN/GLOB SERPL: 1.2 G/DL
ALP SERPL-CCNC: 53 U/L (ref 39–117)
ALT SERPL W P-5'-P-CCNC: 14 U/L (ref 1–41)
ANION GAP SERPL CALCULATED.3IONS-SCNC: 8.1 MMOL/L (ref 5–15)
APTT PPP: 28.1 SECONDS (ref 22.7–35.4)
AST SERPL-CCNC: 21 U/L (ref 1–40)
BASOPHILS # BLD AUTO: 0.04 10*3/MM3 (ref 0–0.2)
BASOPHILS # BLD AUTO: 0.04 10*3/MM3 (ref 0–0.2)
BASOPHILS NFR BLD AUTO: 0.6 % (ref 0–1.5)
BASOPHILS NFR BLD AUTO: 0.8 % (ref 0–1.5)
BILIRUB SERPL-MCNC: <0.2 MG/DL (ref 0–1.2)
BLD GP AB SCN SERPL QL: NEGATIVE
BUN SERPL-MCNC: 12 MG/DL (ref 8–23)
BUN/CREAT SERPL: 10.6 (ref 7–25)
C3 FRG RBC-MCNC: NORMAL
CALCIUM SPEC-SCNC: 8.9 MG/DL (ref 8.6–10.5)
CHLORIDE SERPL-SCNC: 104 MMOL/L (ref 98–107)
CO2 SERPL-SCNC: 24.9 MMOL/L (ref 22–29)
CREAT SERPL-MCNC: 1.13 MG/DL (ref 0.76–1.27)
DEPRECATED RDW RBC AUTO: 55.1 FL (ref 37–54)
DEPRECATED RDW RBC AUTO: 58.4 FL (ref 37–54)
EGFRCR SERPLBLD CKD-EPI 2021: 71.7 ML/MIN/1.73
EOSINOPHIL # BLD AUTO: 0.2 10*3/MM3 (ref 0–0.4)
EOSINOPHIL # BLD AUTO: 0.23 10*3/MM3 (ref 0–0.4)
EOSINOPHIL NFR BLD AUTO: 3.6 % (ref 0.3–6.2)
EOSINOPHIL NFR BLD AUTO: 3.8 % (ref 0.3–6.2)
ERYTHROCYTE [DISTWIDTH] IN BLOOD BY AUTOMATED COUNT: 18.6 % (ref 12.3–15.4)
ERYTHROCYTE [DISTWIDTH] IN BLOOD BY AUTOMATED COUNT: 19.3 % (ref 12.3–15.4)
FERRITIN SERPL-MCNC: 12.7 NG/ML (ref 30–400)
FOLATE SERPL-MCNC: 9.64 NG/ML (ref 4.78–24.2)
GLOBULIN UR ELPH-MCNC: 3.3 GM/DL
GLUCOSE BLDC GLUCOMTR-MCNC: 117 MG/DL (ref 70–130)
GLUCOSE BLDC GLUCOMTR-MCNC: 86 MG/DL (ref 70–130)
GLUCOSE SERPL-MCNC: 112 MG/DL (ref 65–99)
HAPTOGLOB SERPL-MCNC: 117 MG/DL (ref 30–200)
HCT VFR BLD AUTO: 21.3 % (ref 37.5–51)
HCT VFR BLD AUTO: 22.9 % (ref 37.5–51)
HCT VFR BLD AUTO: 23.1 % (ref 37.5–51)
HGB BLD-MCNC: 6.2 G/DL (ref 13–17.7)
HGB BLD-MCNC: 6.4 G/DL (ref 13–17.7)
HGB BLD-MCNC: 7 G/DL (ref 13–17.7)
HYPOCHROMIA BLD QL: NORMAL
IMM GRANULOCYTES # BLD AUTO: 0.01 10*3/MM3 (ref 0–0.05)
IMM GRANULOCYTES # BLD AUTO: 0.02 10*3/MM3 (ref 0–0.05)
IMM GRANULOCYTES NFR BLD AUTO: 0.2 % (ref 0–0.5)
IMM GRANULOCYTES NFR BLD AUTO: 0.3 % (ref 0–0.5)
INR PPP: 1.03 (ref 0.9–1.1)
LYMPHOCYTES # BLD AUTO: 1.73 10*3/MM3 (ref 0.7–3.1)
LYMPHOCYTES # BLD AUTO: 2.06 10*3/MM3 (ref 0.7–3.1)
LYMPHOCYTES NFR BLD AUTO: 32.6 % (ref 19.6–45.3)
LYMPHOCYTES NFR BLD AUTO: 32.7 % (ref 19.6–45.3)
MCH RBC QN AUTO: 23.1 PG (ref 26.6–33)
MCH RBC QN AUTO: 24.2 PG (ref 26.6–33)
MCHC RBC AUTO-ENTMCNC: 27.9 G/DL (ref 31.5–35.7)
MCHC RBC AUTO-ENTMCNC: 29.1 G/DL (ref 31.5–35.7)
MCV RBC AUTO: 82.7 FL (ref 79–97)
MCV RBC AUTO: 83.2 FL (ref 79–97)
MONOCYTES # BLD AUTO: 0.73 10*3/MM3 (ref 0.1–0.9)
MONOCYTES # BLD AUTO: 0.77 10*3/MM3 (ref 0.1–0.9)
MONOCYTES NFR BLD AUTO: 11.6 % (ref 5–12)
MONOCYTES NFR BLD AUTO: 14.6 % (ref 5–12)
NEUTROPHILS NFR BLD AUTO: 2.54 10*3/MM3 (ref 1.7–7)
NEUTROPHILS NFR BLD AUTO: 3.24 10*3/MM3 (ref 1.7–7)
NEUTROPHILS NFR BLD AUTO: 47.9 % (ref 42.7–76)
NEUTROPHILS NFR BLD AUTO: 51.3 % (ref 42.7–76)
NRBC BLD AUTO-RTO: 0 /100 WBC (ref 0–0.2)
NRBC BLD AUTO-RTO: 0 /100 WBC (ref 0–0.2)
PLAT MORPH BLD: NORMAL
PLATELET # BLD AUTO: 149 10*3/MM3 (ref 140–450)
PLATELET # BLD AUTO: 161 10*3/MM3 (ref 140–450)
PMV BLD AUTO: 10.3 FL (ref 6–12)
PMV BLD AUTO: 10.6 FL (ref 6–12)
POTASSIUM SERPL-SCNC: 3.8 MMOL/L (ref 3.5–5.2)
PROT SERPL-MCNC: 7.2 G/DL (ref 6–8.5)
PROTHROMBIN TIME: 13.6 SECONDS (ref 11.7–14.2)
RBC # BLD AUTO: 2.56 10*6/MM3 (ref 4.14–5.8)
RBC # BLD AUTO: 2.77 10*6/MM3 (ref 4.14–5.8)
RETICS # AUTO: 0.03 10*6/MM3 (ref 0.02–0.13)
RETICS/RBC NFR AUTO: 1.23 % (ref 0.7–1.9)
RH BLD: POSITIVE
SODIUM SERPL-SCNC: 137 MMOL/L (ref 136–145)
T&S EXPIRATION DATE: NORMAL
VIT B12 BLD-MCNC: 500 PG/ML (ref 211–946)
WBC MORPH BLD: NORMAL
WBC NRBC COR # BLD AUTO: 5.29 10*3/MM3 (ref 3.4–10.8)
WBC NRBC COR # BLD AUTO: 6.32 10*3/MM3 (ref 3.4–10.8)

## 2024-01-05 PROCEDURE — G0378 HOSPITAL OBSERVATION PER HR: HCPCS

## 2024-01-05 PROCEDURE — 85025 COMPLETE CBC W/AUTO DIFF WBC: CPT | Performed by: EMERGENCY MEDICINE

## 2024-01-05 PROCEDURE — 96374 THER/PROPH/DIAG INJ IV PUSH: CPT

## 2024-01-05 PROCEDURE — 85018 HEMOGLOBIN: CPT | Performed by: INTERNAL MEDICINE

## 2024-01-05 PROCEDURE — 85610 PROTHROMBIN TIME: CPT | Performed by: EMERGENCY MEDICINE

## 2024-01-05 PROCEDURE — 25010000002 HYDROMORPHONE PER 4 MG: Performed by: EMERGENCY MEDICINE

## 2024-01-05 PROCEDURE — 86850 RBC ANTIBODY SCREEN: CPT | Performed by: EMERGENCY MEDICINE

## 2024-01-05 PROCEDURE — 82948 REAGENT STRIP/BLOOD GLUCOSE: CPT

## 2024-01-05 PROCEDURE — 25010000002 HYDROMORPHONE PER 4 MG: Performed by: INTERNAL MEDICINE

## 2024-01-05 PROCEDURE — 82728 ASSAY OF FERRITIN: CPT | Performed by: INTERNAL MEDICINE

## 2024-01-05 PROCEDURE — 85045 AUTOMATED RETICULOCYTE COUNT: CPT | Performed by: INTERNAL MEDICINE

## 2024-01-05 PROCEDURE — 36430 TRANSFUSION BLD/BLD COMPNT: CPT

## 2024-01-05 PROCEDURE — 82607 VITAMIN B-12: CPT | Performed by: INTERNAL MEDICINE

## 2024-01-05 PROCEDURE — 85730 THROMBOPLASTIN TIME PARTIAL: CPT | Performed by: EMERGENCY MEDICINE

## 2024-01-05 PROCEDURE — 99214 OFFICE O/P EST MOD 30 MIN: CPT | Performed by: FAMILY MEDICINE

## 2024-01-05 PROCEDURE — 96376 TX/PRO/DX INJ SAME DRUG ADON: CPT

## 2024-01-05 PROCEDURE — 86923 COMPATIBILITY TEST ELECTRIC: CPT

## 2024-01-05 PROCEDURE — 86900 BLOOD TYPING SEROLOGIC ABO: CPT

## 2024-01-05 PROCEDURE — 85025 COMPLETE CBC W/AUTO DIFF WBC: CPT | Performed by: FAMILY MEDICINE

## 2024-01-05 PROCEDURE — 83010 ASSAY OF HAPTOGLOBIN QUANT: CPT | Performed by: INTERNAL MEDICINE

## 2024-01-05 PROCEDURE — P9016 RBC LEUKOCYTES REDUCED: HCPCS

## 2024-01-05 PROCEDURE — 82746 ASSAY OF FOLIC ACID SERUM: CPT | Performed by: INTERNAL MEDICINE

## 2024-01-05 PROCEDURE — 96375 TX/PRO/DX INJ NEW DRUG ADDON: CPT

## 2024-01-05 PROCEDURE — 85007 BL SMEAR W/DIFF WBC COUNT: CPT | Performed by: FAMILY MEDICINE

## 2024-01-05 PROCEDURE — 25010000002 ONDANSETRON PER 1 MG: Performed by: EMERGENCY MEDICINE

## 2024-01-05 PROCEDURE — 86900 BLOOD TYPING SEROLOGIC ABO: CPT | Performed by: EMERGENCY MEDICINE

## 2024-01-05 PROCEDURE — 86901 BLOOD TYPING SEROLOGIC RH(D): CPT | Performed by: EMERGENCY MEDICINE

## 2024-01-05 PROCEDURE — 85014 HEMATOCRIT: CPT | Performed by: INTERNAL MEDICINE

## 2024-01-05 PROCEDURE — 99285 EMERGENCY DEPT VISIT HI MDM: CPT

## 2024-01-05 PROCEDURE — 80053 COMPREHEN METABOLIC PANEL: CPT | Performed by: EMERGENCY MEDICINE

## 2024-01-05 RX ORDER — HYDROMORPHONE HYDROCHLORIDE 1 MG/ML
0.5 INJECTION, SOLUTION INTRAMUSCULAR; INTRAVENOUS; SUBCUTANEOUS
Status: DISCONTINUED | OUTPATIENT
Start: 2024-01-05 | End: 2024-01-06 | Stop reason: HOSPADM

## 2024-01-05 RX ORDER — IBUPROFEN 600 MG/1
1 TABLET ORAL
Status: DISCONTINUED | OUTPATIENT
Start: 2024-01-05 | End: 2024-01-06 | Stop reason: HOSPADM

## 2024-01-05 RX ORDER — SODIUM CHLORIDE 0.9 % (FLUSH) 0.9 %
10 SYRINGE (ML) INJECTION EVERY 12 HOURS SCHEDULED
Status: DISCONTINUED | OUTPATIENT
Start: 2024-01-05 | End: 2024-01-06 | Stop reason: HOSPADM

## 2024-01-05 RX ORDER — DEXTROSE MONOHYDRATE 25 G/50ML
25 INJECTION, SOLUTION INTRAVENOUS
Status: DISCONTINUED | OUTPATIENT
Start: 2024-01-05 | End: 2024-01-06 | Stop reason: HOSPADM

## 2024-01-05 RX ORDER — OXYCODONE AND ACETAMINOPHEN 10; 325 MG/1; MG/1
1 TABLET ORAL EVERY 8 HOURS PRN
Qty: 90 TABLET | Refills: 0 | Status: SHIPPED | OUTPATIENT
Start: 2024-01-05

## 2024-01-05 RX ORDER — SODIUM CHLORIDE 9 MG/ML
40 INJECTION, SOLUTION INTRAVENOUS AS NEEDED
Status: DISCONTINUED | OUTPATIENT
Start: 2024-01-05 | End: 2024-01-06 | Stop reason: HOSPADM

## 2024-01-05 RX ORDER — PANTOPRAZOLE SODIUM 40 MG/10ML
40 INJECTION, POWDER, LYOPHILIZED, FOR SOLUTION INTRAVENOUS EVERY 12 HOURS SCHEDULED
Status: DISCONTINUED | OUTPATIENT
Start: 2024-01-05 | End: 2024-01-06 | Stop reason: HOSPADM

## 2024-01-05 RX ORDER — HYDROMORPHONE HYDROCHLORIDE 1 MG/ML
0.5 INJECTION, SOLUTION INTRAMUSCULAR; INTRAVENOUS; SUBCUTANEOUS ONCE
Status: COMPLETED | OUTPATIENT
Start: 2024-01-05 | End: 2024-01-05

## 2024-01-05 RX ORDER — FERROUS SULFATE 325(65) MG
325 TABLET ORAL 2 TIMES DAILY
Status: DISCONTINUED | OUTPATIENT
Start: 2024-01-05 | End: 2024-01-06 | Stop reason: HOSPADM

## 2024-01-05 RX ORDER — SODIUM CHLORIDE 0.9 % (FLUSH) 0.9 %
10 SYRINGE (ML) INJECTION AS NEEDED
Status: DISCONTINUED | OUTPATIENT
Start: 2024-01-05 | End: 2024-01-06 | Stop reason: HOSPADM

## 2024-01-05 RX ORDER — OXYCODONE AND ACETAMINOPHEN 10; 325 MG/1; MG/1
1 TABLET ORAL EVERY 8 HOURS PRN
Status: DISCONTINUED | OUTPATIENT
Start: 2024-01-05 | End: 2024-01-06 | Stop reason: HOSPADM

## 2024-01-05 RX ORDER — ONDANSETRON 2 MG/ML
4 INJECTION INTRAMUSCULAR; INTRAVENOUS EVERY 6 HOURS PRN
Status: DISCONTINUED | OUTPATIENT
Start: 2024-01-05 | End: 2024-01-06 | Stop reason: HOSPADM

## 2024-01-05 RX ORDER — NICOTINE POLACRILEX 4 MG
15 LOZENGE BUCCAL
Status: DISCONTINUED | OUTPATIENT
Start: 2024-01-05 | End: 2024-01-06 | Stop reason: HOSPADM

## 2024-01-05 RX ORDER — ONDANSETRON 2 MG/ML
4 INJECTION INTRAMUSCULAR; INTRAVENOUS ONCE
Status: COMPLETED | OUTPATIENT
Start: 2024-01-05 | End: 2024-01-05

## 2024-01-05 RX ORDER — NITROGLYCERIN 0.4 MG/1
0.4 TABLET SUBLINGUAL
Status: DISCONTINUED | OUTPATIENT
Start: 2024-01-05 | End: 2024-01-06 | Stop reason: HOSPADM

## 2024-01-05 RX ADMIN — HYDROMORPHONE HYDROCHLORIDE 0.5 MG: 1 INJECTION, SOLUTION INTRAMUSCULAR; INTRAVENOUS; SUBCUTANEOUS at 21:46

## 2024-01-05 RX ADMIN — Medication 10 ML: at 21:23

## 2024-01-05 RX ADMIN — PANTOPRAZOLE SODIUM 40 MG: 40 INJECTION, POWDER, FOR SOLUTION INTRAVENOUS at 21:22

## 2024-01-05 RX ADMIN — HYDROMORPHONE HYDROCHLORIDE 0.5 MG: 1 INJECTION, SOLUTION INTRAMUSCULAR; INTRAVENOUS; SUBCUTANEOUS at 15:14

## 2024-01-05 RX ADMIN — OXYCODONE AND ACETAMINOPHEN 1 TABLET: 10; 325 TABLET ORAL at 17:55

## 2024-01-05 RX ADMIN — ONDANSETRON 4 MG: 2 INJECTION INTRAMUSCULAR; INTRAVENOUS at 15:14

## 2024-01-05 RX ADMIN — FERROUS SULFATE TAB 325 MG (65 MG ELEMENTAL FE) 325 MG: 325 (65 FE) TAB at 21:22

## 2024-01-05 NOTE — PROGRESS NOTES
"Chief Complaint  Chief Complaint   Patient presents with    Shortness of Breath     Pt here for 3 week f/u. Pt states he's not any better       Subjective    History of Present Illness        Beth Mckinney presents to [facility] for   History of Present Illness  Patient is a 68-year-old male is being seen in clinic for multiple medical problems.  He has complaints of shortness of breath and neuropathic nerve pain.  Despite multiple treatments and chest x-ray his shortness of breath has continued to worsen.  He reports feeling like he may be anemic and needs a blood transfusion.  He also has complaints of herpetic pain from shingles that that started a month ago.  He is currently on oxycodone to help treat his symptoms.  He reports that his symptoms are improving very much.  He would like to know the next step in treatment since his pain has not improved.  Shortness of Breath  This is a recurrent problem. The current episode started more than 1 month ago. The problem occurs constantly. The problem has been gradually worsening. Associated symptoms include abdominal pain, coryza, headaches, vomiting and wheezing. Pertinent negatives include no chest pain, claudication, ear pain, fever, hemoptysis, leg pain, leg swelling, neck pain, orthopnea, PND, rash, rhinorrhea, sore throat, sputum production, swollen glands or syncope. The symptoms are aggravated by any activity. He has tried beta agonist inhalers for the symptoms.        Objective   Vital Signs:   Visit Vitals  /60   Pulse 98   Temp 97.7 °F (36.5 °C)   Resp 20   Ht 188 cm (74\")   Wt 76.7 kg (169 lb)   SpO2 100%   BMI 21.70 kg/m²       BMI is within normal parameters. No other follow-up for BMI required.     Physical Exam  Vitals reviewed.   Constitutional:       Appearance: He is well-developed.   HENT:      Head: Normocephalic.      Right Ear: External ear normal.      Left Ear: External ear normal.      Nose: Nose " normal.   Eyes:      Conjunctiva/sclera: Conjunctivae normal.   Cardiovascular:      Rate and Rhythm: Normal rate and regular rhythm.   Pulmonary:      Effort: Pulmonary effort is normal.      Breath sounds: Normal breath sounds.   Musculoskeletal:         General: Normal range of motion.      Cervical back: Normal range of motion and neck supple.   Skin:     General: Skin is warm and dry.      Capillary Refill: Capillary refill takes less than 2 seconds.          Neurological:      Mental Status: He is alert and oriented to person, place, and time.            Result Review :                    Assessment and Plan      Diagnoses and all orders for this visit:    1. Shortness of breath (Primary)  Assessment & Plan:  Due to his symptoms of worsening shortness of breath he will be referred to pulmonology.  CBC was ordered to check his hemoglobin level to determine if this is a possible cause of his symptoms.  If his symptoms are still present consider a CT scan.    Orders:  -     Ambulatory Referral to Pulmonology    2. Neuralgia, post-herpetic  Assessment & Plan:  Patient was given a refill of oxycodone to help treat his herpetic nerve pain.  He will be referred to pain management because his pain is not improving.    Orders:  -     oxyCODONE-acetaminophen (PERCOCET)  MG per tablet; Take 1 tablet by mouth Every 8 (Eight) Hours As Needed for Moderate Pain.  Dispense: 90 tablet; Refill: 0  -     Ambulatory Referral to Pain Management    3. Iron deficiency anemia, unspecified iron deficiency anemia type  Assessment & Plan:  CBC ordered to check his hemoglobin level.  If his hemoglobin levels below 8 he will need to likely be admitted to the hospital due to blood transfusion.    Orders:  -     Cancel: CBC & Differential             Follow Up   No follow-ups on file.  Patient was given instructions and counseling regarding his condition or for health maintenance advice. Please see specific information pulled into the  AVS if appropriate.       Answers submitted by the patient for this visit:  Primary Reason for Visit (Submitted on 1/4/2024)  What is the primary reason for your visit?: Shortness of Breath

## 2024-01-05 NOTE — TELEPHONE ENCOUNTER
"    Hub staff attempted to follow warm transfer process and was unsuccessful     Caller: Beth Mckinney \"Col. Mckinney\"    Relationship to patient: Self    Best call back number: [phone]     Patient is needing: PATIENT WAS RETURNING A MISSED CALL TO DR SEGURA REGARDING TEST RESULTS, PATIENT HAS FURTHER QUESTIONS. PLEASE ADVISE.       "

## 2024-01-05 NOTE — ED NOTES
"Nursing report ED to floor  Beth Mckinney  68 y.o.  male    HPI :   Chief Complaint   Patient presents with    Abnormal Lab       Admitting doctor:   Tim Rodgers MD    Admitting diagnosis:   The encounter diagnosis was Anemia, unspecified type.    Code status:   Current Code Status       Date Active Code Status Order ID Comments User Context       Not on file            Allergies:   Tree nut, Penicillins, and Sulfa antibiotics    Isolation:   No active isolations    Intake and Output  No intake or output data in the 24 hours ending 01/05/24 1520    Weight:       01/05/24  1351   Weight: 76.7 kg (169 lb)       Most recent vitals:   Vitals:    01/05/24 1348 01/05/24 1351 01/05/24 1352 01/05/24 1516   BP:   (!) 184/88 142/75   Pulse:   95 73   Resp:       Temp:       TempSrc:       SpO2: 99%   100%   Weight:  76.7 kg (169 lb)     Height:  188 cm (74\")         Active LDAs/IV Access:   Lines, Drains & Airways       Active LDAs       Name Placement date Placement time Site Days    Peripheral IV 01/05/24 1403 Left Antecubital 01/05/24  1403  Antecubital  less than 1                    Labs (abnormal labs have a star):   Labs Reviewed   COMPREHENSIVE METABOLIC PANEL - Abnormal; Notable for the following components:       Result Value    Glucose 112 (*)     All other components within normal limits    Narrative:     GFR Normal >60  Chronic Kidney Disease <60  Kidney Failure <15     CBC WITH AUTO DIFFERENTIAL - Abnormal; Notable for the following components:    RBC 2.77 (*)     Hemoglobin 6.4 (*)     Hematocrit 22.9 (*)     MCH 23.1 (*)     MCHC 27.9 (*)     RDW 18.6 (*)     RDW-SD 55.1 (*)     All other components within normal limits   PROTIME-INR - Normal   APTT - Normal   TYPE AND SCREEN   PREPARE RBC   CBC AND DIFFERENTIAL    Narrative:     The following orders were created for panel order CBC & Differential.  Procedure                               Abnormality         Status                     ---------     "                           -----------         ------                     CBC Auto Differential[[id number]]        Abnormal            Final result                 Please view results for these tests on the individual orders.       EKG:   No orders to display       Meds given in ED:   Medications   sodium chloride 0.9 % flush 10 mL (has no administration in time range)   HYDROmorphone (DILAUDID) injection 0.5 mg (0.5 mg Intravenous Given 1/5/24 1514)   ondansetron (ZOFRAN) injection 4 mg (4 mg Intravenous Given 1/5/24 1514)       Imaging results:  No radiology results for the last day    Ambulatory status:   - x1 assist    Social issues:   Social History     Socioeconomic History    Marital status:    Tobacco Use    Smoking status: Never     Passive exposure: Never   Vaping Use    Vaping Use: Never used   Substance and Sexual Activity    Alcohol use: Never     Comment: SELDOM    Drug use: Never    Sexual activity: Not Currently     Birth control/protection: Abstinence       NIH Stroke Scale:       Juan Preston RN  01/05/24 15:20 EST

## 2024-01-05 NOTE — PLAN OF CARE
Problem: Adult Inpatient Plan of Care  Goal: Plan of Care Review  Outcome: Ongoing, Progressing  Goal: Patient-Specific Goal (Individualized)  Outcome: Ongoing, Progressing  Goal: Absence of Hospital-Acquired Illness or Injury  Outcome: Ongoing, Progressing  Intervention: Identify and Manage Fall Risk  Recent Flowsheet Documentation  Taken 1/5/2024 1823 by Mane Flores RN  Safety Promotion/Fall Prevention:   safety round/check completed   room organization consistent   activity supervised   assistive device/personal items within reach   clutter free environment maintained   fall prevention program maintained  Taken 1/5/2024 1721 by Mane Flores RN  Safety Promotion/Fall Prevention:   safety round/check completed   room organization consistent   activity supervised   assistive device/personal items within reach   clutter free environment maintained   fall prevention program maintained  Intervention: Prevent Skin Injury  Recent Flowsheet Documentation  Taken 1/5/2024 1823 by Mane Flores RN  Body Position: supine, legs elevated  Taken 1/5/2024 1721 by Mane Flores RN  Body Position:   weight shifting   sitting up in bed  Skin Protection:   adhesive use limited   tubing/devices free from skin contact  Intervention: Prevent and Manage VTE (Venous Thromboembolism) Risk  Recent Flowsheet Documentation  Taken 1/5/2024 1823 by Mane Flores RN  Activity Management: activity encouraged  Taken 1/5/2024 1721 by Mane Flores RN  Activity Management: activity encouraged  VTE Prevention/Management:   sequential compression devices off   patient refused intervention  Range of Motion: active ROM (range of motion) encouraged  Intervention: Prevent Infection  Recent Flowsheet Documentation  Taken 1/5/2024 1823 by Mane Flores RN  Infection Prevention:   single patient room provided   hand hygiene promoted   equipment surfaces  disinfected   environmental surveillance performed  Taken 1/5/2024 1721 by Mane Flores, RN  Infection Prevention:   single patient room provided   hand hygiene promoted   equipment surfaces disinfected   environmental surveillance performed  Goal: Optimal Comfort and Wellbeing  Outcome: Ongoing, Progressing  Intervention: Monitor Pain and Promote Comfort  Recent Flowsheet Documentation  Taken 1/5/2024 1823 by Mane Flores RN  Pain Management Interventions: see MAR  Taken 1/5/2024 1721 by Mane Flores RN  Pain Management Interventions: see MAR  Intervention: Provide Person-Centered Care  Recent Flowsheet Documentation  Taken 1/5/2024 1721 by Mane Flores RN  Trust Relationship/Rapport:   care explained   choices provided  Goal: Readiness for Transition of Care  Outcome: Ongoing, Progressing  Intervention: Mutually Develop Transition Plan  Recent Flowsheet Documentation  Taken 1/5/2024 1704 by Mane Flores RN  Transportation Anticipated: family or friend will provide  Patient/Family Anticipated Services at Transition: none  Patient/Family Anticipates Transition to: home  Taken 1/5/2024 1703 by Mane Flores RN  Equipment Currently Used at Home: none     Problem: Fall Injury Risk  Goal: Absence of Fall and Fall-Related Injury  Outcome: Ongoing, Progressing  Intervention: Identify and Manage Contributors  Recent Flowsheet Documentation  Taken 1/5/2024 1823 by Mane Flores RN  Medication Review/Management: medications reviewed  Taken 1/5/2024 1721 by Mane Flores RN  Medication Review/Management: medications reviewed  Intervention: Promote Injury-Free Environment  Recent Flowsheet Documentation  Taken 1/5/2024 1823 by Mane Flores, RN  Safety Promotion/Fall Prevention:   safety round/check completed   room organization consistent   activity supervised   assistive device/personal items  within reach   clutter free environment maintained   fall prevention program maintained  Taken 1/5/2024 1721 by Mane Flores, RN  Safety Promotion/Fall Prevention:   safety round/check completed   room organization consistent   activity supervised   assistive device/personal items within reach   clutter free environment maintained   fall prevention program maintained   Goal Outcome Evaluation:      Alert, oriented x4, room air, sinus rhythm, VSS, 1 unit RBC completed today, H&H q8h, assisted x1. Plan of care ongoing.

## 2024-01-05 NOTE — H&P
Internal medicine history and physical  INTERNAL MEDICINE   [facility]       Patient Identification:  Name: Beth Mckinney  Age: 68 y.o.  Sex: male  :  1955  MRN: [id number]                   Primary Care Physician: Nirav Donovan Sr., MD                               Date of admission:2024    Chief Complaint: Pain from primary care physician's office for further workup of low hemoglobin.    History of Present Illness:   Patient is a 68-year-old male who has history of HIV infection with questionable compliance, history of diabetes mellitus and history of recent hospitalization in 2023 for GI bleed and was found to have esophageal tear which was treated with epinephrine injection has been following with his primary care provider for various issues including progressive shortness of breath in the last month or so along with postherpetic neuralgia involving right periorbital area.  Shingles have resolved but he has required oxycodone different and the pain management which is being addressed by his primary care provider.  In this background patient was seen today by his PCP for progressive shortness of breath which has gotten really worse in the last 5-6 days as well as neuropathic pain.  Patient denies any hematemesis or melena.  CBC revealed hemoglobin of 6.2 resulting in him being directed to the emergency room.  Patient is being admitted for blood transfusion and further workup.  Patient is very unhappy about the idea that he is going to be kept n.p.o. after midnight and has been on clear liquid pending GI evaluation.  He is adamant that he would not want any GI procedure or endoscopy and hence there is no need for him to be n.p.o.  An attempt to explain the management of symptomatic anemia with concerns for GI bleed patient got upset and difficult to reason with.  He is unhappy that nobody is taking care of his postherpetic neuralgia of pain around his eye due to  prior history of shingles.  Upon review of his previous hospitalization it appears that patient has been invasive and describing his medical issues and according to the note from admitting physician his diagnosis of HIV was found from review of records as patient did not involve himself this information.  Looking at his medications it looks like patient has been on Biktarvy and his last CD4 count in November was greater than 300.  Patient denies any fever and chills.      Past Medical History:  Past Medical History:   Diagnosis Date    Arthritis     Colon cancer     Diabetes mellitus      Past Surgical History:  Past Surgical History:   Procedure Laterality Date    COLONOSCOPY  08/2016    ENDOSCOPY N/A 10/20/2023    Procedure: ESOPHAGOGASTRODUODENOSCOPY AT BEDSIDE with epi injection;  Surgeon: Guero Lake MD;  Location: [facility] ENDOSCOPY;  Service: Gastroenterology;  Laterality: N/A;  pre: GI bleed  post: esophagitis with dimas bartlett tear    FEMUR SURGERY      JOINT REPLACEMENT      TONSILLECTOMY  09/1969      Home Meds:  (Not in a hospital admission)    Current Meds:     Current Facility-Administered Medications:     [COMPLETED] Insert Peripheral IV, , , Once **AND** sodium chloride 0.9 % flush 10 mL, 10 mL, Intravenous, PRN, Isaac Esparza II, MD    Current Outpatient Medications:     aspirin 81 MG EC tablet, Take 1 tablet by mouth Daily., Disp: , Rfl:     Bictegravir-Emtricitab-Tenofov (Biktarvy) -25 MG per tablet, Take 1 tablet by mouth Daily., Disp: , Rfl:     ferrous sulfate 325 (65 FE) MG tablet, Take 1 tablet by mouth 2 (Two) Times a Day., Disp: 60 tablet, Rfl: 4    metFORMIN (GLUCOPHAGE) 1000 MG tablet, Take 1 tablet by mouth Daily With Breakfast., Disp: , Rfl:     oxyCODONE-acetaminophen (PERCOCET)  MG per tablet, Take 1 tablet by mouth Every 8 (Eight) Hours As Needed for Moderate Pain., Disp: 90 tablet, Rfl: 0    prednisoLONE acetate (PRED FORTE) 1 % ophthalmic suspension, Apply 1  "drop to eye(s) as directed by provider 4 (Four) Times a Day., Disp: , Rfl:     silver sulfadiazine (SILVADENE, SSD) 1 % cream, Apply 1 application  topically to the appropriate area as directed Daily., Disp: , Rfl:   Allergies:  Allergies   Allergen Reactions    Tree Nut Anaphylaxis    Penicillins Swelling    Sulfa Antibiotics Dizziness     Not certain.  Was concerned about dermatitis of legs.     Social History:   Social History     Tobacco Use    Smoking status: Never     Passive exposure: Never    Smokeless tobacco: Not on file   Substance Use Topics    Alcohol use: Never     Comment: SELDOM      Family History:  Family History   Problem Relation Age of Onset    No Known Problems Mother     Diabetes Father             Arthritis Maternal Grandmother                   Review of Systems  See history of present illness and past medical history.    Constitutional: Remarkable for generalized weakness progressive shortness of breath and fatigue over the course of last couple of months worse in the last week or so.  Cardiovascular: Remarkable for shortness of breath but denies any chest pain  Respiratory: Remarkable for no cough or congestion.  GI: Remarkable for no hematemesis or melena.  Preserved appetite.  Patient does have some abdominal discomfort and vomiting.  : Remarkable for no burning in urination frequency or urgency  Musculoskeletal: Remarkable for no new joint aches and pain  Neurological: Remarkable for no loss of consciousness or continence.    Vitals:   /74   Pulse 69   Temp 97 °F (36.1 °C)   Resp 16   Ht 188 cm (74\")   Wt 76.7 kg (169 lb)   SpO2 100%   BMI 21.70 kg/m²   I/O: No intake or output data in the 24 hours ending 24 8427  Exam:  Patient is examined using the personal protective equipment as per guidelines from infection control for this particular patient as enacted.  Hand washing was performed before and after patient interaction.  General Appearance:    " Alert, uncooperative, no distress, appears stated age   Head:    Normocephalic, without obvious abnormality, atraumatic   Eyes:    PERRL, conjunctiva/corneas clear, EOM's intact, both eyes   Ears:    Normal external ear canals, both ears   Nose:   Nares normal, septum midline, mucosa normal, no drainage    or sinus tenderness   Throat:   Lips, tongue, gums normal; oral mucosa pink and moist   Neck: Supple and no adenopathy noted   Back:     Symmetric, no curvature, ROM normal, no CVA tenderness   Lungs:     Clear to auscultation bilaterally, respirations unlabored   Chest Wall:    No tenderness or deformity    Heart:  S1-S2 regular   Abdomen:   Soft mild epigastric tenderness noted   Extremities:   Extremities normal, atraumatic, no cyanosis or edema   Pulses:   Pulses palpable in all extremities; symmetric all extremities   Skin: No active shingles-like lesions noted   Neurologic: Grossly nonfocal examination       Data Review:      I reviewed the patient's new clinical results.  Results from last 7 days   Lab Units 01/05/24  1403 01/05/24  1130   WBC 10*3/mm3 6.32 5.29   HEMOGLOBIN g/dL 6.4* 6.2*   PLATELETS 10*3/mm3 161 149     Results from last 7 days   Lab Units 01/05/24  1403   SODIUM mmol/L 137   POTASSIUM mmol/L 3.8   CHLORIDE mmol/L 104   CO2 mmol/L 24.9   BUN mg/dL 12   CREATININE mg/dL 1.13   CALCIUM mg/dL 8.9   GLUCOSE mg/dL 112*     XR Chest 1 View    Result Date: 12/11/2023  No acute findings.  This report was finalized on 12/11/2023 11:16 PM by Dr. Karuna Brwone M.D on Workstation: [id number]       Assessment:  Active Hospital Problems    Diagnosis  POA    **Symptomatic anemia [D64.9]  Yes    Iron deficiency anemia [D50.9]  Yes     He will be prescribed ferrous sulfate 325 mg; take 1 tablet by mouth every 8 hours as needed.   The patient will have laboratory workup done on 10/30/2023.      Diabetes mellitus [E11.9]  Yes    Legally blind [H54.8]  Yes     Formatting of this note might be different  from the original. 2nd to cataract      HIV infection [B20]  Yes    Rheumatoid arthritis [M06.9]  Yes     Formatting of this note might be different from the original. Added automatically from request for surgery [id number]  Formatting of this note might be different from the original. 2015 IMO UPDATE         Medical decision making/care plan: See admitting orders  Symptomatic anemia with prior history of GI bleed and Sarita-Centeno tear in October requiring endoscopy treatment-plan is to transfuse to keep hemoglobin above 7, serial H&H, empiric IV Protonix and GI consultation.  Keep him n.p.o. after midnight.  HIV infection-continue with his Biktarvy and monitor.  Diabetes mellitus-hold his metformin, continue with Accu-Cheks and sliding scale coverage and watch for hypoglycemia.  History of postherpetic neuralgia involving periorbital area due to prior history of shingles-continue Percocet as prescribed by his primary care provider and Dilaudid on as-needed basis.  Difficult interaction limiting proper evaluation.    Tim Rodgers MD   1/5/2024  16:57 EST    Parts of this note may be an electronic transcription/translation of spoken language to printed text using the Dragon dictation system.

## 2024-01-05 NOTE — ED PROVIDER NOTES
EMERGENCY DEPARTMENT ENCOUNTER    Room Number:  14/14  PCP: Nirav Donovan Sr., MD      HPI:  Chief Complaint: Low hemoglobin  A complete HPI/ROS/PMH/PSH/SH/FH are unobtainable due to: None  Context: Beth Mckinney is a 68 y.o. male who presents to the ED c/o low hemoglobin.  He states that he been feeling very tired for the past week.  He has not noticed any change in his stools, however.  This is happened to him before where he was admitted to the hospital for GI bleeding and had a scope done and was told that he was better.  He takes no blood thinners.          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    Left rotator cuff tear 10/02/2015    Legally blind 11/02/2023    Low back pain 09/29/2014    MRSA (methicillin resistant Staphylococcus aureus) infection 05/26/2013    Myocardial infarction 11/02/2023    Neoplasm of soft tissue of lower extremity 05/30/2017    Orbital cellulitis on left 09/04/2023    Osteoarthritis of left knee 07/29/2013    Neuralgia, post-herpetic 09/04/2023    Rheumatoid arthritis 05/24/2013    Rotator cuff tear arthropathy of both shoulders 07/29/2013    Stroke 10/01/2012    Ulcer of lower limb 06/26/2013    Osteoarthritis of right knee 08/26/2013    Iron deficiency anemia 11/17/2023    Shortness of breath 12/28/2023     Resolved Ambulatory Problems     Diagnosis Date Noted    Acute upper GI bleed 10/19/2023     Past Medical History:   Diagnosis Date    Arthritis      Colon cancer          PAST SURGICAL HISTORY  Past Surgical History:   Procedure Laterality Date    COLONOSCOPY  2016    ENDOSCOPY N/A 10/20/2023    Procedure: ESOPHAGOGASTRODUODENOSCOPY AT BEDSIDE with epi injection;  Surgeon: Guero Lake MD;  Location: [facility] ENDOSCOPY;  Service: Gastroenterology;  Laterality: N/A;  pre: GI bleed  post: esophagitis with dimas bartlett tear    FEMUR SURGERY      JOINT REPLACEMENT      TONSILLECTOMY  1969         FAMILY HISTORY  Family History   Problem Relation Age of Onset    No Known Problems Mother     Diabetes Father             Arthritis Maternal Grandmother                  SOCIAL HISTORY  Social History     Socioeconomic History    Marital status:    Tobacco Use    Smoking status: Never     Passive exposure: Never   Vaping Use    Vaping Use: Never used   Substance and Sexual Activity    Alcohol use: Never     Comment: SELDOM    Drug use: Never    Sexual activity: Not Currently     Birth control/protection: Abstinence         ALLERGIES  Tree nut, Penicillins, and Sulfa antibiotics        REVIEW OF SYSTEMS  Review of Systems     All systems reviewed and negative except for those discussed in HPI.       PHYSICAL EXAM  ED Triage Vitals   Temp Heart Rate Resp BP SpO2   24 1347 24 1347 24 1347 24 1352 24 1348   98.7 °F (37.1 °C) 116 20 (!) 184/88 99 %      Temp src Heart Rate Source Patient Position BP Location FiO2 (%)   24 1347 24 1347 -- -- --   Tympanic Monitor          Physical Exam      GENERAL: no acute distress  HENT: nares patent  EYES: no scleral icterus  CV: regular rhythm, normal rate  RESPIRATORY: normal effort, clear to auscultation bilaterally  ABDOMEN: soft, nontender  RECTAL: Brown stool, heme-negative  MUSCULOSKELETAL: no deformity  NEURO: alert, moves all extremities, follows commands  PSYCH:  calm, cooperative  SKIN: warm, dry    Vital signs and nursing notes reviewed.          LAB  RESULTS  Recent Results (from the past 24 hour(s))   CBC Auto Differential    Collection Time: 01/05/24 11:30 AM    Specimen: Blood   Result Value Ref Range    WBC 5.29 3.40 - 10.80 10*3/mm3    RBC 2.56 (L) 4.14 - 5.80 10*6/mm3    Hemoglobin 6.2 (C) 13.0 - 17.7 g/dL    Hematocrit 21.3 (L) 37.5 - 51.0 %    MCV 83.2 79.0 - 97.0 fL    MCH 24.2 (L) 26.6 - 33.0 pg    MCHC 29.1 (L) 31.5 - 35.7 g/dL    RDW 19.3 (H) 12.3 - 15.4 %    RDW-SD 58.4 (H) 37.0 - 54.0 fl    MPV 10.3 6.0 - 12.0 fL    Platelets 149 140 - 450 10*3/mm3    Neutrophil % 47.9 42.7 - 76.0 %    Lymphocyte % 32.7 19.6 - 45.3 %    Monocyte % 14.6 (H) 5.0 - 12.0 %    Eosinophil % 3.8 0.3 - 6.2 %    Basophil % 0.8 0.0 - 1.5 %    Immature Grans % 0.2 0.0 - 0.5 %    Neutrophils, Absolute 2.54 1.70 - 7.00 10*3/mm3    Lymphocytes, Absolute 1.73 0.70 - 3.10 10*3/mm3    Monocytes, Absolute 0.77 0.10 - 0.90 10*3/mm3    Eosinophils, Absolute 0.20 0.00 - 0.40 10*3/mm3    Basophils, Absolute 0.04 0.00 - 0.20 10*3/mm3    Immature Grans, Absolute 0.01 0.00 - 0.05 10*3/mm3    nRBC 0.0 0.0 - 0.2 /100 WBC   Scan Slide    Collection Time: 01/05/24 11:30 AM    Specimen: Blood   Result Value Ref Range    Hypochromia Mod/2+ None Seen    RBC Fragments Slight/1+ None Seen    WBC Morphology Normal Normal    Platelet Morphology Normal Normal   Comprehensive Metabolic Panel    Collection Time: 01/05/24  2:03 PM    Specimen: Blood   Result Value Ref Range    Glucose 112 (H) 65 - 99 mg/dL    BUN 12 8 - 23 mg/dL    Creatinine 1.13 0.76 - 1.27 mg/dL    Sodium 137 136 - 145 mmol/L    Potassium 3.8 3.5 - 5.2 mmol/L    Chloride 104 98 - 107 mmol/L    CO2 24.9 22.0 - 29.0 mmol/L    Calcium 8.9 8.6 - 10.5 mg/dL    Total Protein 7.2 6.0 - 8.5 g/dL    Albumin 3.9 3.5 - 5.2 g/dL    ALT (SGPT) 14 1 - 41 U/L    AST (SGOT) 21 1 - 40 U/L    Alkaline Phosphatase 53 39 - 117 U/L    Total Bilirubin <0.2 0.0 - 1.2 mg/dL    Globulin 3.3 gm/dL    A/G Ratio 1.2 g/dL    BUN/Creatinine Ratio 10.6 7.0 - 25.0     Anion Gap 8.1 5.0 - 15.0 mmol/L    eGFR 71.7 >60.0 mL/min/1.73   Protime-INR    Collection Time: 01/05/24  2:03 PM    Specimen: Blood   Result Value Ref Range    Protime 13.6 11.7 - 14.2 Seconds    INR 1.03 0.90 - 1.10   aPTT    Collection Time: 01/05/24  2:03 PM    Specimen: Blood   Result Value Ref Range    PTT 28.1 22.7 - 35.4 seconds   Type & Screen    Collection Time: 01/05/24  2:03 PM    Specimen: Blood   Result Value Ref Range    ABO Type A     RH type Positive     Antibody Screen Negative     T&S Expiration Date 1/8/2024 11:59:59 PM    CBC Auto Differential    Collection Time: 01/05/24  2:03 PM    Specimen: Blood   Result Value Ref Range    WBC 6.32 3.40 - 10.80 10*3/mm3    RBC 2.77 (L) 4.14 - 5.80 10*6/mm3    Hemoglobin 6.4 (C) 13.0 - 17.7 g/dL    Hematocrit 22.9 (L) 37.5 - 51.0 %    MCV 82.7 79.0 - 97.0 fL    MCH 23.1 (L) 26.6 - 33.0 pg    MCHC 27.9 (L) 31.5 - 35.7 g/dL    RDW 18.6 (H) 12.3 - 15.4 %    RDW-SD 55.1 (H) 37.0 - 54.0 fl    MPV 10.6 6.0 - 12.0 fL    Platelets 161 140 - 450 10*3/mm3    Neutrophil % 51.3 42.7 - 76.0 %    Lymphocyte % 32.6 19.6 - 45.3 %    Monocyte % 11.6 5.0 - 12.0 %    Eosinophil % 3.6 0.3 - 6.2 %    Basophil % 0.6 0.0 - 1.5 %    Immature Grans % 0.3 0.0 - 0.5 %    Neutrophils, Absolute 3.24 1.70 - 7.00 10*3/mm3    Lymphocytes, Absolute 2.06 0.70 - 3.10 10*3/mm3    Monocytes, Absolute 0.73 0.10 - 0.90 10*3/mm3    Eosinophils, Absolute 0.23 0.00 - 0.40 10*3/mm3    Basophils, Absolute 0.04 0.00 - 0.20 10*3/mm3    Immature Grans, Absolute 0.02 0.00 - 0.05 10*3/mm3    nRBC 0.0 0.0 - 0.2 /100 WBC   Prepare RBC, 1 Units    Collection Time: 01/05/24  3:07 PM   Result Value Ref Range    Product Code T9189W31     Unit Number [id number]     UNIT  ABO A     UNIT  RH POS     Crossmatch Interpretation Compatible     Dispense Status XM     Blood Expiration Date 202401292359     Blood Type Barcode 6200        Ordered the above labs and reviewed the results.      RADIOLOGY  No Radiology  Exams Resulted Within Past 24 Hours    Ordered the above noted radiological studies. Reviewed by me in PACS.          PROCEDURES  Critical Care    Performed by: Isaac Esparza II, MD  Authorized by: Isaac Esparza II, MD    Critical care provider statement:     Critical care time (minutes):  30    Critical care was necessary to treat or prevent imminent or life-threatening deterioration of the following conditions:  Circulatory failure    Critical care was time spent personally by me on the following activities:  Ordering and performing treatments and interventions, ordering and review of laboratory studies, pulse oximetry, re-evaluation of patient's condition, review of old charts, obtaining history from patient or surrogate, discussions with consultants, evaluation of patient's response to treatment and examination of patient          MEDICATIONS GIVEN IN ER  Medications   sodium chloride 0.9 % flush 10 mL (has no administration in time range)   HYDROmorphone (DILAUDID) injection 0.5 mg (has no administration in time range)   ondansetron (ZOFRAN) injection 4 mg (has no administration in time range)         MEDICAL DECISION MAKING, PROGRESS, and CONSULTS    Discussion below represents my analysis of pertinent findings related to patient's condition, differential diagnosis, treatment plan and final disposition.      Orders placed during this visit:  Orders Placed This Encounter   Procedures    Comprehensive Metabolic Panel    Protime-INR    aPTT    CBC Auto Differential    Monitor Blood Pressure    Pulse Oximetry, Continuous    Verify Informed Consent    [facility] (on-call MD unless specified) Details    Type & Screen    Prepare RBC, 1 Units    Insert Peripheral IV    Initiate Observation Status    CBC & Differential                 Differential diagnosis:    GI bleed, anemia of chronic disease, hemolysis               Independent interpretation of labs, radiology studies, and discussions with consultants:  ED  Course as of 01/05/24 1511   Fri Jan 05, 2024   1352 On medical chart review, laboratory work from earlier today collected 11:30 AM shows hemoglobin of 6.2.  Platelets are 149,000.  White blood cell count is 5290. [TD]   1447 On medical chart review, I reviewed discharge summary from 10/22/2023.  Patient was admitted to the hospital for an upper GI bleed with Sarita-Centeno tear with some element of esophagitis.  Patient was seen by GI and had an EGD performed.  Area of bleeding was injected and patient did much better.  He is on Protonix drip and will be discharged home on Protonix twice daily. [TD]   1508 I discussed the case with Dr. Rodgers, hospitalist who will admit. [TD]      ED Course User Index  [TD] Isaac Esparza II, MD       I have not started the patient on PPI as he has heme-negative stool.    DIAGNOSIS  Final diagnoses:   Anemia, unspecified type         DISPOSITION  Admit      Latest Documented Vital Signs:  As of 15:11 EST  BP- (!) 184/88 HR- 95 Temp- 98.7 °F (37.1 °C) (Tympanic) O2 sat- 99%      --    Please note that portions of this were completed with a voice recognition program.       Note Disclaimer: At [facility], we believe that sharing information builds trust and better relationships. You are receiving this note because you are receiving care at [facility] or recently visited. It is possible you will see health information before a provider has talked with you about it. This kind of information can be easy to misunderstand. To help you fully understand what it means for your health, we urge you to discuss this note with your provider.         Isaac Esparza II, MD  01/05/24 1512

## 2024-01-05 NOTE — ASSESSMENT & PLAN NOTE
Patient was given a refill of oxycodone to help treat his herpetic nerve pain.  He will be referred to pain management because his pain is not improving.

## 2024-01-05 NOTE — ASSESSMENT & PLAN NOTE
CBC ordered to check his hemoglobin level.  If his hemoglobin levels below 8 he will need to likely be admitted to the hospital due to blood transfusion.

## 2024-01-05 NOTE — ASSESSMENT & PLAN NOTE
Due to his symptoms of worsening shortness of breath he will be referred to pulmonology.  CBC was ordered to check his hemoglobin level to determine if this is a possible cause of his symptoms.  If his symptoms are still present consider a CT scan.

## 2024-01-06 VITALS
RESPIRATION RATE: 20 BRPM | WEIGHT: 169 LBS | TEMPERATURE: 98.4 F | HEIGHT: 74 IN | DIASTOLIC BLOOD PRESSURE: 74 MMHG | HEART RATE: 70 BPM | BODY MASS INDEX: 21.69 KG/M2 | OXYGEN SATURATION: 99 % | SYSTOLIC BLOOD PRESSURE: 146 MMHG

## 2024-01-06 LAB
ALBUMIN SERPL-MCNC: 3.3 G/DL (ref 3.5–5.2)
ALBUMIN/GLOB SERPL: 1.2 G/DL
ALP SERPL-CCNC: 46 U/L (ref 39–117)
ALT SERPL W P-5'-P-CCNC: 12 U/L (ref 1–41)
ANION GAP SERPL CALCULATED.3IONS-SCNC: 6.6 MMOL/L (ref 5–15)
AST SERPL-CCNC: 16 U/L (ref 1–40)
BH BB BLOOD EXPIRATION DATE: NORMAL
BH BB BLOOD TYPE BARCODE: 6200
BH BB DISPENSE STATUS: NORMAL
BH BB PRODUCT CODE: NORMAL
BH BB UNIT NUMBER: NORMAL
BILIRUB SERPL-MCNC: 0.3 MG/DL (ref 0–1.2)
BUN SERPL-MCNC: 11 MG/DL (ref 8–23)
BUN/CREAT SERPL: 12.5 (ref 7–25)
CALCIUM SPEC-SCNC: 8 MG/DL (ref 8.6–10.5)
CHLORIDE SERPL-SCNC: 108 MMOL/L (ref 98–107)
CO2 SERPL-SCNC: 22.4 MMOL/L (ref 22–29)
CREAT SERPL-MCNC: 0.88 MG/DL (ref 0.76–1.27)
CROSSMATCH INTERPRETATION: NORMAL
DEPRECATED RDW RBC AUTO: 52.5 FL (ref 37–54)
EGFRCR SERPLBLD CKD-EPI 2021: 94.8 ML/MIN/1.73
EOSINOPHIL # BLD MANUAL: 0.33 10*3/MM3 (ref 0–0.4)
EOSINOPHIL NFR BLD MANUAL: 7.4 % (ref 0.3–6.2)
ERYTHROCYTE [DISTWIDTH] IN BLOOD BY AUTOMATED COUNT: 17.3 % (ref 12.3–15.4)
FERRITIN SERPL-MCNC: 14.3 NG/ML (ref 30–400)
GLOBULIN UR ELPH-MCNC: 2.8 GM/DL
GLUCOSE BLDC GLUCOMTR-MCNC: 86 MG/DL (ref 70–130)
GLUCOSE SERPL-MCNC: 83 MG/DL (ref 65–99)
HCT VFR BLD AUTO: 27.8 % (ref 37.5–51)
HGB BLD-MCNC: 8.4 G/DL (ref 13–17.7)
IRON 24H UR-MRATE: 213 MCG/DL (ref 59–158)
IRON SATN MFR SERPL: 55 % (ref 20–50)
LYMPHOCYTES # BLD MANUAL: 1.09 10*3/MM3 (ref 0.7–3.1)
LYMPHOCYTES NFR BLD MANUAL: 9.5 % (ref 5–12)
MCH RBC QN AUTO: 25.4 PG (ref 26.6–33)
MCHC RBC AUTO-ENTMCNC: 30.2 G/DL (ref 31.5–35.7)
MCV RBC AUTO: 84 FL (ref 79–97)
MONOCYTES # BLD: 0.43 10*3/MM3 (ref 0.1–0.9)
NEUTROPHILS # BLD AUTO: 2.65 10*3/MM3 (ref 1.7–7)
NEUTROPHILS NFR BLD MANUAL: 58.9 % (ref 42.7–76)
PLAT MORPH BLD: NORMAL
PLATELET # BLD AUTO: 121 10*3/MM3 (ref 140–450)
PMV BLD AUTO: 10.7 FL (ref 6–12)
POTASSIUM SERPL-SCNC: 3.8 MMOL/L (ref 3.5–5.2)
PROT SERPL-MCNC: 6.1 G/DL (ref 6–8.5)
RBC # BLD AUTO: 3.31 10*6/MM3 (ref 4.14–5.8)
RBC MORPH BLD: NORMAL
SODIUM SERPL-SCNC: 137 MMOL/L (ref 136–145)
TIBC SERPL-MCNC: 387 MCG/DL (ref 298–536)
TRANSFERRIN SERPL-MCNC: 260 MG/DL (ref 200–360)
TSH SERPL DL<=0.05 MIU/L-ACNC: 0.87 UIU/ML (ref 0.27–4.2)
UNIT  ABO: NORMAL
UNIT  RH: NORMAL
VARIANT LYMPHS NFR BLD MANUAL: 24.2 % (ref 19.6–45.3)
WBC MORPH BLD: NORMAL
WBC NRBC COR # BLD AUTO: 4.5 10*3/MM3 (ref 3.4–10.8)

## 2024-01-06 PROCEDURE — 82948 REAGENT STRIP/BLOOD GLUCOSE: CPT

## 2024-01-06 PROCEDURE — 25010000002 HYDROMORPHONE PER 4 MG: Performed by: INTERNAL MEDICINE

## 2024-01-06 PROCEDURE — 36430 TRANSFUSION BLD/BLD COMPNT: CPT

## 2024-01-06 PROCEDURE — 83540 ASSAY OF IRON: CPT | Performed by: INTERNAL MEDICINE

## 2024-01-06 PROCEDURE — 84443 ASSAY THYROID STIM HORMONE: CPT | Performed by: INTERNAL MEDICINE

## 2024-01-06 PROCEDURE — 82728 ASSAY OF FERRITIN: CPT | Performed by: INTERNAL MEDICINE

## 2024-01-06 PROCEDURE — 85007 BL SMEAR W/DIFF WBC COUNT: CPT | Performed by: INTERNAL MEDICINE

## 2024-01-06 PROCEDURE — 80053 COMPREHEN METABOLIC PANEL: CPT | Performed by: INTERNAL MEDICINE

## 2024-01-06 PROCEDURE — P9016 RBC LEUKOCYTES REDUCED: HCPCS

## 2024-01-06 PROCEDURE — G0378 HOSPITAL OBSERVATION PER HR: HCPCS

## 2024-01-06 PROCEDURE — 96376 TX/PRO/DX INJ SAME DRUG ADON: CPT

## 2024-01-06 PROCEDURE — 86900 BLOOD TYPING SEROLOGIC ABO: CPT

## 2024-01-06 PROCEDURE — 85025 COMPLETE CBC W/AUTO DIFF WBC: CPT | Performed by: INTERNAL MEDICINE

## 2024-01-06 PROCEDURE — 84466 ASSAY OF TRANSFERRIN: CPT | Performed by: INTERNAL MEDICINE

## 2024-01-06 RX ADMIN — OXYCODONE AND ACETAMINOPHEN 1 TABLET: 10; 325 TABLET ORAL at 01:39

## 2024-01-06 RX ADMIN — HYDROMORPHONE HYDROCHLORIDE 0.5 MG: 1 INJECTION, SOLUTION INTRAMUSCULAR; INTRAVENOUS; SUBCUTANEOUS at 03:03

## 2024-01-06 RX ADMIN — HYDROMORPHONE HYDROCHLORIDE 0.5 MG: 1 INJECTION, SOLUTION INTRAMUSCULAR; INTRAVENOUS; SUBCUTANEOUS at 06:06

## 2024-01-06 NOTE — NURSING NOTE
Pt refusing care this morning, pt is leaving by AMA, pt refused sign AMA form. Doctor Sara and Charge Nurse Juan spaulding.

## 2024-01-06 NOTE — PLAN OF CARE
Goal Outcome Evaluation:  Plan of Care Reviewed With: patient        Progress: improving  Outcome Evaluation: A&Ox4, vss, SR and on RA. Recieved 2 units of PRBC's, tolerated well. Currently NPO, awaiting GI consult. Pain controlled with diluadid and percocet.

## 2024-01-06 NOTE — CASE MANAGEMENT/SOCIAL WORK
Case Management Discharge Note      Final Note: AMA         Selected Continued Care - Discharged on 1/6/2024 Admission date: 1/5/2024 - Discharge disposition: Left Against Medical Advice      Destination    No services have been selected for the patient.                Durable Medical Equipment    No services have been selected for the patient.                Dialysis/Infusion    No services have been selected for the patient.                Home Medical Care    No services have been selected for the patient.                Therapy    No services have been selected for the patient.                Community Resources    No services have been selected for the patient.                Community & DME    No services have been selected for the patient.                         Final Discharge Disposition Code: 07 - left AMA

## 2024-01-19 ENCOUNTER — TELEPHONE (OUTPATIENT)
Dept: PAIN MEDICINE | Facility: CLINIC | Age: 69
End: 2024-01-19
Payer: MEDICARE

## 2024-01-22 ENCOUNTER — PREP FOR SURGERY (OUTPATIENT)
Dept: SURGERY | Facility: SURGERY CENTER | Age: 69
End: 2024-01-22
Payer: COMMERCIAL

## 2024-01-22 ENCOUNTER — OFFICE VISIT (OUTPATIENT)
Dept: FAMILY MEDICINE CLINIC | Facility: CLINIC | Age: 69
End: 2024-01-22
Payer: MEDICARE

## 2024-01-22 ENCOUNTER — OFFICE VISIT (OUTPATIENT)
Dept: PAIN MEDICINE | Facility: CLINIC | Age: 69
End: 2024-01-22
Payer: MEDICARE

## 2024-01-22 ENCOUNTER — LAB (OUTPATIENT)
Dept: LAB | Facility: HOSPITAL | Age: 69
End: 2024-01-22
Payer: MEDICARE

## 2024-01-22 ENCOUNTER — TRANSCRIBE ORDERS (OUTPATIENT)
Dept: SURGERY | Facility: SURGERY CENTER | Age: 69
End: 2024-01-22
Payer: COMMERCIAL

## 2024-01-22 VITALS
TEMPERATURE: 97.3 F | HEART RATE: 89 BPM | RESPIRATION RATE: 18 BRPM | DIASTOLIC BLOOD PRESSURE: 78 MMHG | SYSTOLIC BLOOD PRESSURE: 134 MMHG | WEIGHT: 165.2 LBS | OXYGEN SATURATION: 97 % | HEIGHT: 74 IN | BODY MASS INDEX: 21.2 KG/M2

## 2024-01-22 VITALS
TEMPERATURE: 98.2 F | BODY MASS INDEX: 21.56 KG/M2 | OXYGEN SATURATION: 97 % | SYSTOLIC BLOOD PRESSURE: 134 MMHG | HEIGHT: 74 IN | RESPIRATION RATE: 20 BRPM | HEART RATE: 77 BPM | WEIGHT: 168 LBS | DIASTOLIC BLOOD PRESSURE: 70 MMHG

## 2024-01-22 DIAGNOSIS — L65.9 HAIR LOSS: ICD-10-CM

## 2024-01-22 DIAGNOSIS — D50.9 IRON DEFICIENCY ANEMIA, UNSPECIFIED IRON DEFICIENCY ANEMIA TYPE: Primary | ICD-10-CM

## 2024-01-22 DIAGNOSIS — B02.29 POST HERPETIC NEURALGIA: Primary | ICD-10-CM

## 2024-01-22 DIAGNOSIS — Z41.9 SURGERY, ELECTIVE: Primary | ICD-10-CM

## 2024-01-22 LAB
BASOPHILS # BLD AUTO: 0.07 10*3/MM3 (ref 0–0.2)
BASOPHILS NFR BLD AUTO: 1 % (ref 0–1.5)
DEPRECATED RDW RBC AUTO: 57.1 FL (ref 37–54)
EOSINOPHIL # BLD AUTO: 0.19 10*3/MM3 (ref 0–0.4)
EOSINOPHIL NFR BLD AUTO: 2.6 % (ref 0.3–6.2)
ERYTHROCYTE [DISTWIDTH] IN BLOOD BY AUTOMATED COUNT: 18.6 % (ref 12.3–15.4)
HCT VFR BLD AUTO: 31 % (ref 37.5–51)
HGB BLD-MCNC: 9.2 G/DL (ref 13–17.7)
IMM GRANULOCYTES # BLD AUTO: 0.01 10*3/MM3 (ref 0–0.05)
IMM GRANULOCYTES NFR BLD AUTO: 0.1 % (ref 0–0.5)
LYMPHOCYTES # BLD AUTO: 2.54 10*3/MM3 (ref 0.7–3.1)
LYMPHOCYTES NFR BLD AUTO: 35.4 % (ref 19.6–45.3)
MCH RBC QN AUTO: 24.9 PG (ref 26.6–33)
MCHC RBC AUTO-ENTMCNC: 29.7 G/DL (ref 31.5–35.7)
MCV RBC AUTO: 83.8 FL (ref 79–97)
MONOCYTES # BLD AUTO: 1.02 10*3/MM3 (ref 0.1–0.9)
MONOCYTES NFR BLD AUTO: 14.2 % (ref 5–12)
NEUTROPHILS NFR BLD AUTO: 3.35 10*3/MM3 (ref 1.7–7)
NEUTROPHILS NFR BLD AUTO: 46.7 % (ref 42.7–76)
NRBC BLD AUTO-RTO: 0 /100 WBC (ref 0–0.2)
PLATELET # BLD AUTO: 338 10*3/MM3 (ref 140–450)
PMV BLD AUTO: 10.2 FL (ref 6–12)
RBC # BLD AUTO: 3.7 10*6/MM3 (ref 4.14–5.8)
WBC NRBC COR # BLD AUTO: 7.18 10*3/MM3 (ref 3.4–10.8)

## 2024-01-22 PROCEDURE — 1111F DSCHRG MED/CURRENT MED MERGE: CPT | Performed by: FAMILY MEDICINE

## 2024-01-22 PROCEDURE — 1125F AMNT PAIN NOTED PAIN PRSNT: CPT | Performed by: PHYSICIAN ASSISTANT

## 2024-01-22 PROCEDURE — 36415 COLL VENOUS BLD VENIPUNCTURE: CPT | Performed by: FAMILY MEDICINE

## 2024-01-22 PROCEDURE — 99214 OFFICE O/P EST MOD 30 MIN: CPT | Performed by: FAMILY MEDICINE

## 2024-01-22 PROCEDURE — 1159F MED LIST DOCD IN RCRD: CPT | Performed by: PHYSICIAN ASSISTANT

## 2024-01-22 PROCEDURE — 99204 OFFICE O/P NEW MOD 45 MIN: CPT | Performed by: PHYSICIAN ASSISTANT

## 2024-01-22 PROCEDURE — 1160F RVW MEDS BY RX/DR IN RCRD: CPT | Performed by: PHYSICIAN ASSISTANT

## 2024-01-22 PROCEDURE — 85025 COMPLETE CBC W/AUTO DIFF WBC: CPT | Performed by: FAMILY MEDICINE

## 2024-01-22 RX ORDER — SODIUM CHLORIDE 0.9 % (FLUSH) 0.9 %
10 SYRINGE (ML) INJECTION EVERY 12 HOURS SCHEDULED
Status: CANCELLED | OUTPATIENT
Start: 2024-01-22

## 2024-01-22 RX ORDER — GABAPENTIN ENACARBIL 300 MG/1
300 TABLET, EXTENDED RELEASE ORAL 2 TIMES DAILY
Qty: 60 TABLET | Refills: 0 | Status: SHIPPED | OUTPATIENT
Start: 2024-01-22 | End: 2024-01-23 | Stop reason: SDUPTHER

## 2024-01-22 RX ORDER — SODIUM CHLORIDE 0.9 % (FLUSH) 0.9 %
10 SYRINGE (ML) INJECTION AS NEEDED
Status: CANCELLED | OUTPATIENT
Start: 2024-01-22

## 2024-01-22 RX ORDER — LOTEPREDNOL ETABONATE 5 MG/ML
SUSPENSION/ DROPS OPHTHALMIC
COMMUNITY
Start: 2024-01-16

## 2024-01-22 NOTE — PROGRESS NOTES
"Chief Complaint  Chief Complaint   Patient presents with    Hospital Follow Up Visit     Pt here for f/u of low hemoglobin levels        Transitional Care Progress Note        Subjective    History of Present Illness        Beth Mckinney is a 68 y.o. male who presents for a transitional care management visit.    Within 48 business hours after discharge our office contacted him via telephone to coordinate his care and needs.      I reviewed and discussed the details of that call along with the discharge summary, hospital problems, inpatient lab results, inpatient diagnostic studies, and consultation reports with Beth.     Current outpatient and discharge medications have been reconciled for the patient.  Reviewed by: Nirav Donovan Sr., MD        Risk for Readmission (LACE) No data recorded    Beth Mckineny presents to [facility] for   History of Present Illness  The patient is a 68-year-old male who presents for a hospital follow-up.     Hospital follow-up  The patient was sent to the hospital because he could not breathe and his left eye was \"killing him.\" He had to have a blood transfusion because his hemoglobin was low he was having symptoms of fatigue. His left eye is swollen again from having shingles months ago.  Patient reports that he had anger while in the hospital and left the hospital before being officially discharged.  He reports that they wanted to do another colonoscopy despite the fact that he recently had one done.     Hair loss  The patient's hair has been falling out since 01/21/2024. He started taking eye drops on 01/19/2024. He was told to stop taking the eye drops.         Objective   Vital Signs:   Visit Vitals  /70   Pulse 77   Temp 98.2 °F (36.8 °C)   Resp 20   Ht 188 cm (74\")   Wt 76.2 kg (168 lb)   SpO2 97%   BMI 21.57 kg/m²       BMI is within normal parameters. No other follow-up for BMI required.     Physical Exam  Vitals " reviewed.   Constitutional:       Appearance: He is well-developed.   HENT:      Head: Normocephalic.      Right Ear: External ear normal.      Left Ear: External ear normal.      Nose: Nose normal.   Eyes:      Conjunctiva/sclera: Conjunctivae normal.   Cardiovascular:      Rate and Rhythm: Normal rate and regular rhythm.   Pulmonary:      Effort: Pulmonary effort is normal.      Breath sounds: Normal breath sounds.   Musculoskeletal:         General: Normal range of motion.      Cervical back: Normal range of motion and neck supple.   Skin:     General: Skin is warm and dry.      Capillary Refill: Capillary refill takes less than 2 seconds.   Neurological:      Mental Status: He is alert and oriented to person, place, and time.              Result Review :                      Assessment and Plan      Diagnoses and all orders for this visit:    1. Iron deficiency anemia, unspecified iron deficiency anemia type (Primary)  Assessment & Plan:  Hospital records reviewed.  CBC ordered.  Patient was advised to follow-up with GI to help determine cause of hemoglobin loss.  Patient is encouraged to continue taking iron tablets.    Orders:  -     CBC & Differential    2. Hair loss  Assessment & Plan:  Unknown etiology of symptoms.  This may likely be due to his shingles infection damaging his hair follicles.               Follow Up   No follow-ups on file.  Patient was given instructions and counseling regarding his condition or for health maintenance advice. Please see specific information pulled into the AVS if appropriate.

## 2024-01-22 NOTE — H&P (VIEW-ONLY)
CHIEF COMPLAINT  Neuralgia      Subjective   Camo Jonatan Mckinney is a 68 y.o. male.   He presents to the office for initial evaluation of left-sided facial/eye pain. He was referred here by Nirav Donovan Sr., MD .  This patient states that on September 4, 2023 he had onset of shingles affecting the left eye as well as a persistent sharp and burning sensation radiating superiorly into the temporal and frontal portion of his head.  The patient states that evaluation with ophthalmology states that the eye has been spared however he continues to have severe pain within the orbit.  As stated above the patient describes the pain as a continuous, sharp and burning sensation which has not been alleviated with hydrocodone, currently taking oxycodone 10 mg 3 times daily with minimal relief as well.  He has failed previous trials of gabapentin 600 mg p.o. 3 times daily and pregabalin 150 mg p.o. 3 times daily stating that it made him sick and also caused hallucinations.  Patient is unable to determine what aggravates the pain stating that it was simply flare up at will.    Patient's medical history is complicated with HIV infection which is being managed by Dr. Andrew Don at [facility].  He states that he is also under the care of Dr. Rik Velasquez for questionable rheumatoid arthritis versus pseudogout versus gout.    Pain today reported at 10/10 VAS in severity.    Pain  This is a new problem. The current episode started more than 1 month ago. The problem occurs constantly. The problem has been unchanged. Associated symptoms include headaches, numbness and weakness. Pertinent negatives include no abdominal pain or fatigue. Associated symptoms comments: Eye pain--left. Nothing aggravates the symptoms. He has tried oral narcotics for the symptoms. The treatment provided no relief.        PEG Assessment   What number best describes your pain on average in the past week?10  What number best describes  how, during the past week, pain has interfered with your enjoyment of life?10  What number best describes how, during the past week, pain has interfered with your general activity?  10        Current Outpatient Medications:     aspirin 81 MG EC tablet, Take 1 tablet by mouth Daily., Disp: , Rfl:     Bictegravir-Emtricitab-Tenofov (Biktarvy) -25 MG per tablet, Take 1 tablet by mouth Daily., Disp: , Rfl:     ferrous sulfate 325 (65 FE) MG tablet, Take 1 tablet by mouth 2 (Two) Times a Day., Disp: 60 tablet, Rfl: 4    loteprednol (LOTEMAX) 0.5 % ophthalmic suspension, Instill 1 drop into left eye four times daily, Disp: , Rfl:     metFORMIN (GLUCOPHAGE) 1000 MG tablet, Take 1 tablet by mouth Daily With Breakfast., Disp: , Rfl:     oxyCODONE-acetaminophen (PERCOCET)  MG per tablet, Take 1 tablet by mouth Every 8 (Eight) Hours As Needed for Moderate Pain., Disp: 90 tablet, Rfl: 0    prednisoLONE acetate (PRED FORTE) 1 % ophthalmic suspension, Apply 1 drop to eye(s) as directed by provider 4 (Four) Times a Day., Disp: , Rfl:     sennosides-docusate (senna-docusate sodium) 8.6-50 MG per tablet, Take 2 tablets by mouth Daily., Disp: 60 tablet, Rfl: 0    silver sulfadiazine (SILVADENE, SSD) 1 % cream, Apply 1 application  topically to the appropriate area as directed Daily., Disp: , Rfl:     tobramycin 0.3 % solution ophthalmic solution, Administer 2 drops into the left eye Every 4 (Four) Hours While Awake., Disp: 5 mL, Rfl: 0    Gabapentin Enacarbil ER (Horizant) 300 MG tablet controlled-release, Take 300 mg by mouth 2 (Two) Times a Day., Disp: 60 tablet, Rfl: 0    The following portions of the patient's history were reviewed and updated as appropriate: allergies, current medications, past family history, past medical history, past social history, past surgical history, and problem list.      REVIEW OF PERTINENT MEDICAL DATA        Review of Systems   Constitutional:  Positive for activity change. Negative for  "fatigue.   Gastrointestinal:  Negative for abdominal pain, constipation and diarrhea.   Genitourinary:  Negative for difficulty urinating and dysuria.   Neurological:  Positive for weakness, light-headedness, numbness and headaches. Negative for dizziness.   Psychiatric/Behavioral:  Negative for agitation, sleep disturbance and suicidal ideas. The patient is not nervous/anxious.        I have reviewed and confirmed the accuracy of the ROS as documented by the MA/LPN/RN ROSALINDA Edwards    Vitals:    01/22/24 1510   BP: 134/78   BP Location: Left arm   Patient Position: Sitting   Cuff Size: Large Adult   Pulse: 89   Resp: 18   Temp: 97.3 °F (36.3 °C)   SpO2: 97%   Weight: 74.9 kg (165 lb 3.2 oz)   Height: 188 cm (74\")   PainSc: 10-Worst pain ever         Objective       Physical Exam  Vitals and nursing note reviewed.   Constitutional:       General: He is in acute distress.      Appearance: Normal appearance. He is normal weight.      Comments: Appears to be in severe discomfort/pain during examination   HENT:      Head: Normocephalic.     Neurological:      Mental Status: He is alert and oriented to person, place, and time.      Cranial Nerves: Cranial nerves 2-12 are intact.      Sensory: Sensation is intact.      Motor: Motor function is intact.      Gait: Gait is intact.      Deep Tendon Reflexes:      Reflex Scores:       Tricep reflexes are 1+ on the right side and 1+ on the left side.       Bicep reflexes are 1+ on the right side and 1+ on the left side.       Brachioradialis reflexes are 1+ on the right side and 1+ on the left side.  Psychiatric:         Mood and Affect: Mood normal.         Behavior: Behavior normal.         Thought Content: Thought content normal.         Judgment: Judgment normal.         Assessment & Plan   Diagnoses and all orders for this visit:    1. Post herpetic neuralgia (Primary)  -     Gabapentin Enacarbil ER (Horizant) 300 MG tablet controlled-release; Take 300 mg by mouth 2 " (Two) Times a Day.  Dispense: 60 tablet; Refill: 0        --- Beth Mckinney reports a pain score of 10.  Given his pain assessment as noted, treatment options were discussed and the following options were decided upon as a follow-up plan to address the patient's pain: educational materials on pain management, prescription for non-opiod analgesics, and use of non-medical modalities (ice, heat, stretching and/or behavior modifications).    --- Follow-up in four weeks for medication assessment.  ---Patient failed previous trial of gabapentin.  I have discussed with the patient that we will proceed with a trial of horizant 300 mg BID which is indicated for PHN.  Based on his response I would proceed to Horizant 600 mg BID if needed.    ---Future consideration be retrial of pregabalin at a lower dose to see if the patient would tolerate it better.  --I also recommend proceeding with a diagnostic Left Stellate Ganglion Nerve Block for sympathetic mediated pain.  The risk and benefits of the procedure have been discussed with the patient and his questions have been addressed.     ---  Education about Stellate Ganglion Blockade:    The stellate ganglion is part of the sympathetic nervous system that is located in your neck, on either side of your voice box. A stellate ganglion block is an injection of medication into these nerves that can help relieve pain in the head, neck, upper arm and upper chest. It also can help increase circulation and blood supply to the arm.    A stellate ganglion block may be right for you if you have nerve pain in the head, neck, upper arm or upper chest that does not respond to other treatment.  A stellate ganglion block is used to diagnose or treat circulation problems or nerve injuries, including but not limited to Complex Regional Pain Syndrome Types 1 or 2, also known as Reflex Sympathetic Dystrophy (RSD) or Causalgia, respectively, and also sometimes for Phantom Limb Pain or  other nerve problems.      Stellate Ganglion Blockade:    First, you may be given an intravenous medication to relax you. Then, you’ll lie on your back on an x-ray table and your neck will be cleansed.  The doctor will insert a thin needle into your neck, near your voice box, and inject a local anesthetic. Then, with x-ray or ultrasound guidance, he or she will insert a second needle to the location on the anterior aspect of the spine, in the vicinity of the stellate ganglion,  and carefully inject an anesthetic medication.  Usually, the procedure takes less than 30 minutes, and you can go home the same day after being monitored in the recovery room.  How effective is a stellate ganglion block?  Some patients report pain relief immediately after the injection, but the pain may return a few hours later as the local anesthetic wears off. Other patients have longer term relief that outlasts the duration of the local anesthetic and helps them to reduce their medication use and increase their participation in physical therapy.  How long the pain relief lasts is different for each patient. Some may be pain-free for days or weeks. Usually people need a series of injections to continue the pain relief. Sometimes it takes only two injections; sometimes it takes more than 10. The relief tends to last longer with each treatment.  What are the risks?  The risk of complications from a stellate ganglion block is very low. However, there could be bruising or soreness at the injection site. Serious complications, including infection, bleeding and nerve damage, are uncommon.  Side effects of the procedure may include:  Drooping eyelids   Red or “bloodshot” eyes   Tearing   Nasal stuffiness   Hoarse voice   Feeling of a “lump” in your throat   Difficulty swallowing   Warmth or tingling in your arm or hand  These effects will subside within a few hours.  What happens after the procedure?  Do not drive or do any rigorous activity for  24 hours after your stellate ganglion block. Take it easy. You can return to your normal activities the next day.  If you have difficulty swallowing or voice changes, then wait until this resolves before eating a meal.           CAREN REPORT    As part of the patient's treatment plan, I am prescribing controlled substances. The patient has been made aware of appropriate use of such medications, including potential risk of somnolence, limited ability to drive and/or work safely, and the potential for dependence or overdose. It has also bee made clear that these medications are for use by this patient only, without concomitant use of alcohol or other substances unless prescribed.     Patient has completed prescribing agreement detailing terms of continued prescribing of controlled substances, including monitoring CAREN reports, urine drug screening, and pill counts if necessary. The patient is aware that inappropriate use will results in cessation of prescribing such medications.    CAREN report has been reviewed and scanned into the patient's chart.    As the clinician, I personally reviewed the CAREN from 1/22/24 while the patient was in the office today.    History and physical exam exhibit continued safe and appropriate use of controlled substances.       Dictated utilizing Dragon dictation.

## 2024-01-22 NOTE — PROGRESS NOTES
"Chief Complaint  Chief Complaint   Patient presents with    Hospital Follow Up Visit     Pt here for f/u of low hemoglobin levels      CC  Hospital follow-up    Transitional Care Progress Note        Subjective    History of Present Illness   {CC  Problem List  Visit Diagnosis   Encounters  Notes  Medications  Labs  Result Review Imaging  Media :23}     Beth Mckinney is a 68 y.o. male who presents for a transitional care management visit.    Within 48 business hours after discharge our office contacted him via telephone to coordinate his care and needs.      I reviewed and discussed the details of that call along with the discharge summary, hospital problems, inpatient lab results, inpatient diagnostic studies, and consultation reports with Beth.     Current outpatient and discharge medications have been reconciled for the patient.  Reviewed by: Nirav Donovan Sr, MD           10/22/2023     5:20 PM   Date of TCM Phone Call   [facility]   Date of Admission 10/19/2023   Date of Discharge 10/22/2023     Risk for Readmission (LACE) No data recorded    Beth Mckinney presents to [facility] for   History of Present Illness   The patient is a 68-year-old male who presents for a hospital follow-up.    Hospital follow-up  The patient was sent to the hospital because he could not breathe and his left eye was \"killing him.\" He had to have a blood transfusion. His left eye is swollen again.    Hair loss  The patient's hair has been falling out since 01/21/2024. He started taking eye drops on 01/19/2024. He was told to stop taking the eye drops.      Objective   Vital Signs:   Visit Vitals  /70   Pulse 77   Temp 98.2 °F (36.8 °C)   Resp 20   Ht 188 cm (74\")   Wt 76.2 kg (168 lb)   SpO2 97%   BMI 21.57 kg/m²       BMI is within normal parameters. No other follow-up for BMI required.     Physical Exam  Vitals reviewed.   Constitutional:  " "     Appearance: He is well-developed.   HENT:      Head: Normocephalic.      Right Ear: External ear normal.      Left Ear: External ear normal.      Nose: Nose normal.   Eyes:      Conjunctiva/sclera: Conjunctivae normal.   Cardiovascular:      Rate and Rhythm: Normal rate and regular rhythm.   Pulmonary:      Effort: Pulmonary effort is normal.      Breath sounds: Normal breath sounds.   Musculoskeletal:         General: Normal range of motion.      Cervical back: Normal range of motion and neck supple.   Skin:     General: Skin is warm and dry.      Capillary Refill: Capillary refill takes less than 2 seconds.   Neurological:      Mental Status: He is alert and oriented to person, place, and time.        ***DAXPE  {DIABETIC FOOT EXAM FOR  (Optional):95760::\"Diabetic Foot Exam Performed\":0}    Result Review :  { Labs  Result Review  Imaging  Med Tab  Media :23}   {The following data was reviewed by (Optional):78896}  {Ambulatory Labs (Optional):11634}  {Data reviewed (Optional):23932:::1}   ***DAXRESULTS           Assessment and Plan   {CC Problem List  Visit Diagnosis  ROS  Review (Popup)  Health Maintenance  Quality  BestPractice  Medications  SmartSets  SnapShot Encounters  Media :23}   Diagnoses and all orders for this visit:    1. Iron deficiency anemia, unspecified iron deficiency anemia type (Primary)  -     CBC & Differential       ***DAXAP  {Time Spent (Optional):85945}    Follow Up {Instructions Charge Capture  Follow-up Communications :23}  No follow-ups on file.  Patient was given instructions and counseling regarding his condition or for health maintenance advice. Please see specific information pulled into the AVS if appropriate.       Transcribed from ambient dictation for Nirav Donovan Sr, MD by Za Cast.  01/22/24   12:59 EST    Patient or patient representative verbalized consent to the visit recording.  I have personally performed the services described in this " document as transcribed by the above individual, and it is both accurate and complete.

## 2024-01-22 NOTE — PROGRESS NOTES
CHIEF COMPLAINT  Neuralgia      Subjective   Camo Jonatan Mckinney is a 68 y.o. male.   He presents to the office for initial evaluation of left-sided facial/eye pain. He was referred here by Nirav Donovan Sr., MD .  This patient states that on September 4, 2023 he had onset of shingles affecting the left eye as well as a persistent sharp and burning sensation radiating superiorly into the temporal and frontal portion of his head.  The patient states that evaluation with ophthalmology states that the eye has been spared however he continues to have severe pain within the orbit.  As stated above the patient describes the pain as a continuous, sharp and burning sensation which has not been alleviated with hydrocodone, currently taking oxycodone 10 mg 3 times daily with minimal relief as well.  He has failed previous trials of gabapentin 600 mg p.o. 3 times daily and pregabalin 150 mg p.o. 3 times daily stating that it made him sick and also caused hallucinations.  Patient is unable to determine what aggravates the pain stating that it was simply flare up at will.    Patient's medical history is complicated with HIV infection which is being managed by Dr. Andrew Don at [facility].  He states that he is also under the care of Dr. Rik Velasquez for questionable rheumatoid arthritis versus pseudogout versus gout.    Pain today reported at 10/10 VAS in severity.    Pain  This is a new problem. The current episode started more than 1 month ago. The problem occurs constantly. The problem has been unchanged. Associated symptoms include headaches, numbness and weakness. Pertinent negatives include no abdominal pain or fatigue. Associated symptoms comments: Eye pain--left. Nothing aggravates the symptoms. He has tried oral narcotics for the symptoms. The treatment provided no relief.        PEG Assessment   What number best describes your pain on average in the past week?10  What number best describes  how, during the past week, pain has interfered with your enjoyment of life?10  What number best describes how, during the past week, pain has interfered with your general activity?  10        Current Outpatient Medications:     aspirin 81 MG EC tablet, Take 1 tablet by mouth Daily., Disp: , Rfl:     Bictegravir-Emtricitab-Tenofov (Biktarvy) -25 MG per tablet, Take 1 tablet by mouth Daily., Disp: , Rfl:     ferrous sulfate 325 (65 FE) MG tablet, Take 1 tablet by mouth 2 (Two) Times a Day., Disp: 60 tablet, Rfl: 4    loteprednol (LOTEMAX) 0.5 % ophthalmic suspension, Instill 1 drop into left eye four times daily, Disp: , Rfl:     metFORMIN (GLUCOPHAGE) 1000 MG tablet, Take 1 tablet by mouth Daily With Breakfast., Disp: , Rfl:     oxyCODONE-acetaminophen (PERCOCET)  MG per tablet, Take 1 tablet by mouth Every 8 (Eight) Hours As Needed for Moderate Pain., Disp: 90 tablet, Rfl: 0    prednisoLONE acetate (PRED FORTE) 1 % ophthalmic suspension, Apply 1 drop to eye(s) as directed by provider 4 (Four) Times a Day., Disp: , Rfl:     sennosides-docusate (senna-docusate sodium) 8.6-50 MG per tablet, Take 2 tablets by mouth Daily., Disp: 60 tablet, Rfl: 0    silver sulfadiazine (SILVADENE, SSD) 1 % cream, Apply 1 application  topically to the appropriate area as directed Daily., Disp: , Rfl:     tobramycin 0.3 % solution ophthalmic solution, Administer 2 drops into the left eye Every 4 (Four) Hours While Awake., Disp: 5 mL, Rfl: 0    Gabapentin Enacarbil ER (Horizant) 300 MG tablet controlled-release, Take 300 mg by mouth 2 (Two) Times a Day., Disp: 60 tablet, Rfl: 0    The following portions of the patient's history were reviewed and updated as appropriate: allergies, current medications, past family history, past medical history, past social history, past surgical history, and problem list.      REVIEW OF PERTINENT MEDICAL DATA        Review of Systems   Constitutional:  Positive for activity change. Negative for  "fatigue.   Gastrointestinal:  Negative for abdominal pain, constipation and diarrhea.   Genitourinary:  Negative for difficulty urinating and dysuria.   Neurological:  Positive for weakness, light-headedness, numbness and headaches. Negative for dizziness.   Psychiatric/Behavioral:  Negative for agitation, sleep disturbance and suicidal ideas. The patient is not nervous/anxious.        I have reviewed and confirmed the accuracy of the ROS as documented by the MA/LPN/RN ROSALINDA Edwards    Vitals:    01/22/24 1510   BP: 134/78   BP Location: Left arm   Patient Position: Sitting   Cuff Size: Large Adult   Pulse: 89   Resp: 18   Temp: 97.3 °F (36.3 °C)   SpO2: 97%   Weight: 74.9 kg (165 lb 3.2 oz)   Height: 188 cm (74\")   PainSc: 10-Worst pain ever         Objective       Physical Exam  Vitals and nursing note reviewed.   Constitutional:       General: He is in acute distress.      Appearance: Normal appearance. He is normal weight.      Comments: Appears to be in severe discomfort/pain during examination   HENT:      Head: Normocephalic.     Neurological:      Mental Status: He is alert and oriented to person, place, and time.      Cranial Nerves: Cranial nerves 2-12 are intact.      Sensory: Sensation is intact.      Motor: Motor function is intact.      Gait: Gait is intact.      Deep Tendon Reflexes:      Reflex Scores:       Tricep reflexes are 1+ on the right side and 1+ on the left side.       Bicep reflexes are 1+ on the right side and 1+ on the left side.       Brachioradialis reflexes are 1+ on the right side and 1+ on the left side.  Psychiatric:         Mood and Affect: Mood normal.         Behavior: Behavior normal.         Thought Content: Thought content normal.         Judgment: Judgment normal.         Assessment & Plan   Diagnoses and all orders for this visit:    1. Post herpetic neuralgia (Primary)  -     Gabapentin Enacarbil ER (Horizant) 300 MG tablet controlled-release; Take 300 mg by mouth 2 " (Two) Times a Day.  Dispense: 60 tablet; Refill: 0        --- Beth Mckinney reports a pain score of 10.  Given his pain assessment as noted, treatment options were discussed and the following options were decided upon as a follow-up plan to address the patient's pain: educational materials on pain management, prescription for non-opiod analgesics, and use of non-medical modalities (ice, heat, stretching and/or behavior modifications).    --- Follow-up in four weeks for medication assessment.  ---Patient failed previous trial of gabapentin.  I have discussed with the patient that we will proceed with a trial of horizant 300 mg BID which is indicated for PHN.  Based on his response I would proceed to Horizant 600 mg BID if needed.    ---Future consideration be retrial of pregabalin at a lower dose to see if the patient would tolerate it better.  --I also recommend proceeding with a diagnostic Left Stellate Ganglion Nerve Block for sympathetic mediated pain.  The risk and benefits of the procedure have been discussed with the patient and his questions have been addressed.     ---  Education about Stellate Ganglion Blockade:    The stellate ganglion is part of the sympathetic nervous system that is located in your neck, on either side of your voice box. A stellate ganglion block is an injection of medication into these nerves that can help relieve pain in the head, neck, upper arm and upper chest. It also can help increase circulation and blood supply to the arm.    A stellate ganglion block may be right for you if you have nerve pain in the head, neck, upper arm or upper chest that does not respond to other treatment.  A stellate ganglion block is used to diagnose or treat circulation problems or nerve injuries, including but not limited to Complex Regional Pain Syndrome Types 1 or 2, also known as Reflex Sympathetic Dystrophy (RSD) or Causalgia, respectively, and also sometimes for Phantom Limb Pain or  other nerve problems.      Stellate Ganglion Blockade:    First, you may be given an intravenous medication to relax you. Then, you’ll lie on your back on an x-ray table and your neck will be cleansed.  The doctor will insert a thin needle into your neck, near your voice box, and inject a local anesthetic. Then, with x-ray or ultrasound guidance, he or she will insert a second needle to the location on the anterior aspect of the spine, in the vicinity of the stellate ganglion,  and carefully inject an anesthetic medication.  Usually, the procedure takes less than 30 minutes, and you can go home the same day after being monitored in the recovery room.  How effective is a stellate ganglion block?  Some patients report pain relief immediately after the injection, but the pain may return a few hours later as the local anesthetic wears off. Other patients have longer term relief that outlasts the duration of the local anesthetic and helps them to reduce their medication use and increase their participation in physical therapy.  How long the pain relief lasts is different for each patient. Some may be pain-free for days or weeks. Usually people need a series of injections to continue the pain relief. Sometimes it takes only two injections; sometimes it takes more than 10. The relief tends to last longer with each treatment.  What are the risks?  The risk of complications from a stellate ganglion block is very low. However, there could be bruising or soreness at the injection site. Serious complications, including infection, bleeding and nerve damage, are uncommon.  Side effects of the procedure may include:  Drooping eyelids   Red or “bloodshot” eyes   Tearing   Nasal stuffiness   Hoarse voice   Feeling of a “lump” in your throat   Difficulty swallowing   Warmth or tingling in your arm or hand  These effects will subside within a few hours.  What happens after the procedure?  Do not drive or do any rigorous activity for  24 hours after your stellate ganglion block. Take it easy. You can return to your normal activities the next day.  If you have difficulty swallowing or voice changes, then wait until this resolves before eating a meal.           CAREN REPORT    As part of the patient's treatment plan, I am prescribing controlled substances. The patient has been made aware of appropriate use of such medications, including potential risk of somnolence, limited ability to drive and/or work safely, and the potential for dependence or overdose. It has also bee made clear that these medications are for use by this patient only, without concomitant use of alcohol or other substances unless prescribed.     Patient has completed prescribing agreement detailing terms of continued prescribing of controlled substances, including monitoring CAREN reports, urine drug screening, and pill counts if necessary. The patient is aware that inappropriate use will results in cessation of prescribing such medications.    CAREN report has been reviewed and scanned into the patient's chart.    As the clinician, I personally reviewed the CAREN from 1/22/24 while the patient was in the office today.    History and physical exam exhibit continued safe and appropriate use of controlled substances.       Dictated utilizing Dragon dictation.

## 2024-01-23 ENCOUNTER — TELEPHONE (OUTPATIENT)
Dept: PAIN MEDICINE | Facility: CLINIC | Age: 69
End: 2024-01-23
Payer: COMMERCIAL

## 2024-01-23 DIAGNOSIS — B02.29 POST HERPETIC NEURALGIA: ICD-10-CM

## 2024-01-23 PROBLEM — L65.9 HAIR LOSS: Status: ACTIVE | Noted: 2024-01-23

## 2024-01-23 RX ORDER — GABAPENTIN ENACARBIL 300 MG/1
300 TABLET, EXTENDED RELEASE ORAL 2 TIMES DAILY
Qty: 60 TABLET | Refills: 0 | Status: SHIPPED | OUTPATIENT
Start: 2024-01-23

## 2024-01-23 NOTE — ASSESSMENT & PLAN NOTE
Hospital records reviewed.  CBC ordered.  Patient was advised to follow-up with GI to help determine cause of hemoglobin loss.  Patient is encouraged to continue taking iron tablets.

## 2024-01-23 NOTE — TELEPHONE ENCOUNTER
Please re send new rx for pt he stated he has been having issues with [facility] so he will no longer use them. I will call and cancel previous rx.

## 2024-01-23 NOTE — TELEPHONE ENCOUNTER
[facility] INFORMED PATIENT THAT THEY NEED A PRIOR AUTH SENT IN ORDER FOR HIM TO GET IT. PLEASE SEND ASAP.     PATIENT IS ASKING THAT BYRON NELSON CALL HIM PERSONALLY ABOUT THIS NOT BEING DONE.

## 2024-01-23 NOTE — ASSESSMENT & PLAN NOTE
Unknown etiology of symptoms.  This may likely be due to his shingles infection damaging his hair follicles.

## 2024-01-23 NOTE — TELEPHONE ENCOUNTER
"Caller: Beth Mckinney \"Col. Mckinney\"    Relationship: Self    Best call back number: [phone] (home)     Requested Prescriptions:   Requested Prescriptions     Pending Prescriptions Disp Refills    Gabapentin Enacarbil ER (Horizant) 300 MG tablet controlled-release 60 tablet 0     Sig: Take 300 mg by mouth 2 (Two) Times a Day.        Pharmacy where request should be sent: [facility]     Last office visit with prescribing clinician: 1/22/2024   Last telemedicine visit with prescribing clinician: Visit date not found   Next office visit with prescribing clinician: 2/19/2024     Additional details provided by patient: PT STATED HE WOULD LIKE TO SWITCH PHARMACIES TO THE [facility]. RX NEEDS A PRE AUTH    Would you like a call back once the refill request has been completed: [x] Yes [] No    If the office needs to give you a call back, can they leave a voicemail: [x] Yes [] No    Yu Cano Rep   01/23/24 09:02 EST         "

## 2024-01-24 ENCOUNTER — TELEPHONE (OUTPATIENT)
Dept: PAIN MEDICINE | Facility: CLINIC | Age: 69
End: 2024-01-24

## 2024-01-24 RX ORDER — MIDAZOLAM HYDROCHLORIDE 2 MG/2ML
4 INJECTION, SOLUTION INTRAMUSCULAR; INTRAVENOUS ONCE
Status: COMPLETED | OUTPATIENT
Start: 2024-01-25 | End: 2024-01-25

## 2024-01-24 NOTE — SIGNIFICANT NOTE
Patient educated on the following :    - If you are receiving Sedation for your procedure Nothing to Eat 6 hours and only clear liquids for 2 hours prior to your procedure.    -You will need to have someone drive you home after your PROCEDURE and remain with you for 24 hours after the PROCEDURE  - The date of your procedure, your are welcome to have one visitor at bedside or remain within 10-15 minutes of [facility]  -You will need to arrive at 1000 on 1/25 PROCEDURE  -Please contact [employer] [facility] at: [phone] with any questions and/or concerns

## 2024-01-24 NOTE — TELEPHONE ENCOUNTER
"    Caller: Beth Mckinney \"Col. Mckinney\"    Relationship to patient: Self    Best call back number:     Patient is needing: UNABLE TO WARM TRANSFER.  PATIENT IS HAVING TROUBLE GETTING MEDICATION AND IS TRYING TO SEE IF PA IS TAKEN CARE OF YET.  HE HAS BEEN TRYING TO GET HIS MEDICINE FOR 3 DAYS.  PLEASE CONTACT PATIENT        "

## 2024-01-25 ENCOUNTER — HOSPITAL ENCOUNTER (OUTPATIENT)
Dept: GENERAL RADIOLOGY | Facility: SURGERY CENTER | Age: 69
Setting detail: HOSPITAL OUTPATIENT SURGERY
End: 2024-01-25
Payer: MEDICARE

## 2024-01-25 ENCOUNTER — HOSPITAL ENCOUNTER (OUTPATIENT)
Facility: SURGERY CENTER | Age: 69
Setting detail: HOSPITAL OUTPATIENT SURGERY
Discharge: HOME | End: 2024-01-25
Attending: ANESTHESIOLOGY | Admitting: ANESTHESIOLOGY
Payer: MEDICARE

## 2024-01-25 VITALS
SYSTOLIC BLOOD PRESSURE: 135 MMHG | WEIGHT: 180 LBS | TEMPERATURE: 98 F | OXYGEN SATURATION: 100 % | RESPIRATION RATE: 16 BRPM | HEART RATE: 80 BPM | HEIGHT: 74 IN | DIASTOLIC BLOOD PRESSURE: 81 MMHG | BODY MASS INDEX: 23.1 KG/M2

## 2024-01-25 DIAGNOSIS — B02.29 POST HERPETIC NEURALGIA: ICD-10-CM

## 2024-01-25 DIAGNOSIS — Z41.9 SURGERY, ELECTIVE: ICD-10-CM

## 2024-01-25 PROCEDURE — 25010000002 FENTANYL CITRATE (PF) 50 MCG/ML SOLUTION: Performed by: ANESTHESIOLOGY

## 2024-01-25 PROCEDURE — 64510 N BLOCK STELLATE GANGLION: CPT | Performed by: ANESTHESIOLOGY

## 2024-01-25 PROCEDURE — 25010000002 DEXAMETHASONE SODIUM PHOSPHATE 100 MG/10ML SOLUTION 10 ML VIAL: Performed by: ANESTHESIOLOGY

## 2024-01-25 PROCEDURE — 76000 FLUOROSCOPY <1 HR PHYS/QHP: CPT

## 2024-01-25 PROCEDURE — 25010000002 MIDAZOLAM PER 1MG: Performed by: ANESTHESIOLOGY

## 2024-01-25 PROCEDURE — 77003 FLUOROGUIDE FOR SPINE INJECT: CPT | Performed by: ANESTHESIOLOGY

## 2024-01-25 PROCEDURE — 25510000001 IOPAMIDOL 61 % SOLUTION 30 ML VIAL: Performed by: ANESTHESIOLOGY

## 2024-01-25 PROCEDURE — 25010000002 BUPIVACAINE (PF) 0.5 % SOLUTION 10 ML VIAL: Performed by: ANESTHESIOLOGY

## 2024-01-25 PROCEDURE — 77002 NEEDLE LOCALIZATION BY XRAY: CPT

## 2024-01-25 RX ORDER — FENTANYL CITRATE 50 UG/ML
50 INJECTION, SOLUTION INTRAMUSCULAR; INTRAVENOUS ONCE
Status: COMPLETED | OUTPATIENT
Start: 2024-01-25 | End: 2024-01-25

## 2024-01-25 RX ORDER — SODIUM CHLORIDE 0.9 % (FLUSH) 0.9 %
10 SYRINGE (ML) INJECTION AS NEEDED
Status: DISCONTINUED | OUTPATIENT
Start: 2024-01-25 | End: 2024-01-25 | Stop reason: HOSPADM

## 2024-01-25 RX ORDER — SODIUM CHLORIDE 0.9 % (FLUSH) 0.9 %
10 SYRINGE (ML) INJECTION EVERY 12 HOURS SCHEDULED
Status: DISCONTINUED | OUTPATIENT
Start: 2024-01-25 | End: 2024-01-25 | Stop reason: HOSPADM

## 2024-01-25 RX ADMIN — FENTANYL CITRATE 50 MCG: 0.05 INJECTION, SOLUTION INTRAMUSCULAR; INTRAVENOUS at 11:23

## 2024-01-25 RX ADMIN — MIDAZOLAM HYDROCHLORIDE 4 MG: 2 INJECTION, SOLUTION INTRAMUSCULAR; INTRAVENOUS at 11:23

## 2024-01-25 NOTE — DISCHARGE INSTRUCTIONS
[facility] [place] [facility] - Post-procedure Instructions          --  While there are no absolute restrictions, it is recommended that you do not perform strenuous activity today. In the morning, you may resume your level of activity as before your block.    --  If you have a band-aid at your injection site, please remove it later today. Observe the area for any redness, swelling, pus-like drainage, or a temperature over 101°. If any of these symptoms occur, please call your doctor at [phone]. If after office hours, leave a message and the on-call provider will return your call.    --  Ice may be applied to your injection site. It is recommended you avoid direct heat (heating pad; hot tub) for 1-2 days.    --  Call [facility] [place] at [phone] if you experience persistent headache, persistent bleeding from the injection site, or severe pain not relieved by heat or oral medication.    --  Do not make important decisions today.    --  Due to the effects of the block and/or the I.V. Sedation, DO NOT drive or operate hazardous machinery for 12 hours.  Local anesthetics may cause numbness after procedure and precautions must be taken with regards to operating equipment as well as with walking, even if ambulating with assistance of another person or with an assistive device.    --  Do not drink alcohol for 12 hours.    -- You may return to work tomorrow, or as directed by your referring doctor.    --  Occasionally you may notice a slight increase in your pain after the procedure. This should start to improve within the next 24-48 hours. Radiofrequency ablation procedure pain may last 3-4 weeks.    --  It may take as long as 3-4 days before you notice a gradual improvement in your pain and/or other symptoms.    -- You may continue to take your prescribed pain medication as needed.    --  Some normal possible side effects of steroid use could include fluid retention, increased blood sugar, dull headache,  increased sweating, increased appetite, mood swings and flushing.    --  Diabetics are recommended to watch their blood glucose level closely for 24-48 hours after the injection.    --  Must stay in PACU for 20 min upon arrival and prove no leg weakness before being discharged.    --  IN THE EVENT OF A LIFE THREATENING EMERGENCY, (CHEST PAIN, BREATHING DIFFICULTIES, PARALYSIS…) YOU SHOULD GO TO YOUR NEAREST EMERGENCY ROOM.    --  You should be contacted by our office within 2-3 days to schedule follow up or next appointment date.  If not contacted within 7 days, please call the office at [phone]

## 2024-01-25 NOTE — OP NOTE
Left Stellate Ganglion Blockade (Left SGB)  [facility]    PREOPERATIVE DIAGNOSIS: Postherpetic neuralgia with sympathetically mediated pain in the left frontal area and the left orbital area and left temporal area.  Including V1 dermatomal distribution.    POSTOPERATIVE DIAGNOSIS: Same as preoperative dx    PROCEDURE:  Stellate Ganglion Block, on the LEFT, with fluoroscopy  CPT 37253, Cervical Sympathetic Blockade    PRE-PROCEDURE DISCUSSION WITH PATIENT:    Risks and complications were discussed with the patient prior to starting the procedure and informed consent was obtained.   Risks were discussed, including but not limited to recurrent laryngeal nerve paralysis, phrenic nerve paresis, upper extremity somatic blocks including possible complete brachial plexus blockade, neuraxial block, intravascular injection and risk of seizures, and pneumothorax.   Also discussed were signs of a successful SGB, including Dipti Syndrome, nasal congestion, vasodilation in the upper extremity and temperature increase in the upper extremity.    SURGEON:  Justin Wisdom MD    REASON FOR PROCEDURE:    This patient has spreading of pain that is concerning for progression &/or migration of sympathetic dystrophy.  History of shingles, persistent sharp and burning sensations neuropathic pain but also a lot of sensitivity.    SEDATION:  Versed 4mg & Fentanyl 50 mcg IV    LOCAL ANESTHETIC: Marcaine 0.5% - 2.5ml  STEROID:   15mg dexamethasone    TOTAL VOLUME OF SOLUTION: 4 mL    DESCRIPTON OF PROCEDURE:    After obtaining informed consent, I.V. was started in the preop area.   The patient was taken to the operating room and placed in the supine position with neck alignment in a neutral position.  EKG, blood pressure, and pulse oximeter were monitored throughout, and sedation was provided as needed by the RN under my guidance. All pressure points were well padded.      The C-arm was placed over the patient and a true  AP view was obtained.  The [place]’s Tubercle was identified at C6 and then identified was the uncinate process of C7 on the aforementioned side.  A point just inferior to the uncinate process of C7 was identified, and a line was identified connecting the uncinate processes of C7 & T1.  On that line, just inferior to the uncinate process of C7, that point was marked on the skin and anesthetized with subcutaneous local anesthetic.  Palpation was performed to ensure that this point was medial to the carotid artery, and then a 25-gauge needle was inserted perpendicularly in a coaxial plane until contacting the vertebral body at that point.      Aspiration was confirmed to be negative.  Next, 1.5ml of contrast dye was injected, and was seen to spread along the anterolateral margin of the vertebral body on the ipsilateral side.    Anterolateral placement of the dye was confirmed in a lateral view, and the needle tip was seen to be lying along the anterolateral surface of the cervical vertebra.  The aforementioned local anesthetic solution was then slowly injected in 1ml increments with frequent confirmation of negative aspiration.  The needle was then removed intact.  Vital signs remained stable throughout.      ESTIMATED BLOOD LOSS:  <5 mL  SPECIMENS:  None    COMPLICATIONS: No complications were noted., There was no indication of vascular uptake on live injection of contrast dye., and The patient did not have any signs of postprocedure numbness nor weakness.    TOLERANCE & DISCHARGE CONDITION:    The patient tolerated the procedure well.  The patient was transported to the recovery area without difficulties.  The patient was monitored for at least 30 minutes after the procedure, and temperature increase in the ipsilateral upper extremity was noted.  The patient was discharged to home under the care of family in stable and satisfactory condition.    PLAN OF CARE:  The patient was given our standard instruction  sheet.  The patient will Return to clinic 2-3 wks.    The patient will resume all medications as per the medication reconciliation sheet.

## 2024-01-27 ENCOUNTER — DOCUMENTATION (OUTPATIENT)
Dept: PAIN MEDICINE | Facility: CLINIC | Age: 69
End: 2024-01-27
Payer: COMMERCIAL

## 2024-01-27 NOTE — PROGRESS NOTES
Mr. Mckinney contacted the answering service on 1/27/2024 at 1643. He completed a left stellate ganglion block on 1/25/2024 performed by Dr. Wisdom for PHN. For approximately 48 hours after the procedure his forehead and eyelid pain was well controlled. He noted that this morning his left eyelid pain significantly worsened and is not being relieved with use of ice or his oxycodone. He denies any injection site swelling, fever, chills, or upper or lower extremity weakness. He started Horizant 300 mg BID 3 days ago and notes no side effects with this. I recommended that he go ahead and increase his Horizant to 600 mg BID, side effects reviewed with patient. If he experiences side effects I recommend that he decrease back to 300 mg BID. He has not been using heat, but does state that this has been helpful in the past. I reviewed that it can take 72 hours or so for the steroid from his injection to begin to work, he stated understanding.

## 2024-01-29 ENCOUNTER — TELEPHONE (OUTPATIENT)
Dept: PAIN MEDICINE | Facility: CLINIC | Age: 69
End: 2024-01-29
Payer: COMMERCIAL

## 2024-01-29 NOTE — TELEPHONE ENCOUNTER
Left message for patient to call back
Will you call and check on how Mr. Mckinney is doing today? He called the service with worsening pain over the weekend and I instructed him to increase his Horizant.   
negative - No discharge, No redness

## 2024-02-06 ENCOUNTER — TELEPHONE (OUTPATIENT)
Dept: PAIN MEDICINE | Facility: CLINIC | Age: 69
End: 2024-02-06
Payer: COMMERCIAL

## 2024-02-06 DIAGNOSIS — K59.03 DRUG-INDUCED CONSTIPATION: ICD-10-CM

## 2024-02-06 DIAGNOSIS — B02.29 POST HERPETIC NEURALGIA: ICD-10-CM

## 2024-02-06 RX ORDER — GABAPENTIN ENACARBIL 300 MG/1
300 TABLET, EXTENDED RELEASE ORAL 2 TIMES DAILY
Qty: 60 TABLET | Refills: 0 | Status: CANCELLED | OUTPATIENT
Start: 2024-02-06

## 2024-02-06 RX ORDER — DOCUSATE SODIUM -SENNOSIDES 50; 8.6 MG/1; MG/1
2 TABLET, COATED ORAL DAILY
Qty: 60 TABLET | Refills: 0 | Status: SHIPPED | OUTPATIENT
Start: 2024-02-06

## 2024-02-06 NOTE — TELEPHONE ENCOUNTER
"    Caller: Beth Mckinney \"Col. Mckinney\"    Relationship: Self    Best call back number: [phone]    Requested Prescriptions: HORIZANT 300 MG  Requested Prescriptions      No prescriptions requested or ordered in this encounter        Pharmacy where request should be sent:  [facility] [phone]    Last office visit with prescribing clinician: 1/22/2024     Next office visit with prescribing clinician: 2/19/2024     Additional details provided by patient: PATIENT STATES THAT SINCE THE DOSAGE WAS INCREASED, HE WILL BE OUT ON 02/13    Does the patient have less than a 3 day supply:  [] Yes  [x] No    "

## 2024-02-08 ENCOUNTER — OFFICE VISIT (OUTPATIENT)
Dept: FAMILY MEDICINE CLINIC | Facility: CLINIC | Age: 69
End: 2024-02-08
Payer: MEDICARE

## 2024-02-08 VITALS
BODY MASS INDEX: 21.56 KG/M2 | HEART RATE: 88 BPM | SYSTOLIC BLOOD PRESSURE: 138 MMHG | WEIGHT: 168 LBS | OXYGEN SATURATION: 99 % | HEIGHT: 74 IN | DIASTOLIC BLOOD PRESSURE: 68 MMHG | RESPIRATION RATE: 18 BRPM | TEMPERATURE: 98.2 F

## 2024-02-08 DIAGNOSIS — Z21 ASYMPTOMATIC HIV INFECTION, WITH NO HISTORY OF HIV-RELATED ILLNESS: ICD-10-CM

## 2024-02-08 DIAGNOSIS — B02.29 NEURALGIA, POST-HERPETIC: Primary | ICD-10-CM

## 2024-02-08 DIAGNOSIS — D50.9 IRON DEFICIENCY ANEMIA, UNSPECIFIED IRON DEFICIENCY ANEMIA TYPE: ICD-10-CM

## 2024-02-08 PROCEDURE — 99213 OFFICE O/P EST LOW 20 MIN: CPT | Performed by: FAMILY MEDICINE

## 2024-02-08 RX ORDER — OXYCODONE AND ACETAMINOPHEN 10; 325 MG/1; MG/1
1 TABLET ORAL EVERY 8 HOURS PRN
Qty: 90 TABLET | Refills: 0 | Status: SHIPPED | OUTPATIENT
Start: 2024-02-08

## 2024-02-08 RX ORDER — BICTEGRAVIR SODIUM, EMTRICITABINE, AND TENOFOVIR ALAFENAMIDE FUMARATE 50; 200; 25 MG/1; MG/1; MG/1
1 TABLET ORAL DAILY
Qty: 90 TABLET | Refills: 2 | Status: SHIPPED | OUTPATIENT
Start: 2024-02-08

## 2024-02-08 RX ORDER — FERROUS SULFATE 325(65) MG
325 TABLET ORAL 2 TIMES DAILY
Qty: 60 TABLET | Refills: 4 | Status: SHIPPED | OUTPATIENT
Start: 2024-02-08

## 2024-02-08 NOTE — PROGRESS NOTES
"Chief Complaint  Chief Complaint   Patient presents with    Procedure     Pt here for post op f/u ganglion nerve block        Subjective    History of Present Illness        Beth Mckinney presents to [facility] for   History of Present Illness  Patient is a 68-year-old follow-up on his ganglion nerve block.  His pain is being managed by pain management.  He is currently taking oxycodone for his pain.  Pain management clinic decided to do a ganglion nerve block to help with his herpetic pain.  He states that the procedure went well and his pain is improved but not completely resolved.  He states that pain management would like to do another nerve block to help with his symptoms.       Objective   Vital Signs:   Visit Vitals  /68   Pulse 88   Temp 98.2 °F (36.8 °C)   Resp 18   Ht 188 cm (74\")   Wt 76.2 kg (168 lb)   SpO2 99%   BMI 21.57 kg/m²       BMI is within normal parameters. No other follow-up for BMI required.     Physical Exam  Vitals reviewed.   Constitutional:       Appearance: He is well-developed.   HENT:      Head: Normocephalic.      Right Ear: External ear normal.      Left Ear: External ear normal.      Nose: Nose normal.   Eyes:      Conjunctiva/sclera: Conjunctivae normal.   Cardiovascular:      Rate and Rhythm: Normal rate and regular rhythm.   Pulmonary:      Effort: Pulmonary effort is normal.      Breath sounds: Normal breath sounds.   Musculoskeletal:         General: Normal range of motion.      Cervical back: Normal range of motion and neck supple.   Skin:     General: Skin is warm and dry.      Capillary Refill: Capillary refill takes less than 2 seconds.   Neurological:      Mental Status: He is alert and oriented to person, place, and time.            Result Review :                    Assessment and Plan      Diagnoses and all orders for this visit:    1. Neuralgia, post-herpetic (Primary)  Assessment & Plan:  He received the ganglion nerve " block with symptoms.  He will follow-up with pain management to receive the second ganglion nerve block.  Patient is currently on gabapentin and oxycodone to help control his pain    Orders:  -     oxyCODONE-acetaminophen (PERCOCET)  MG per tablet; Take 1 tablet by mouth Every 8 (Eight) Hours As Needed for Moderate Pain.  Dispense: 90 tablet; Refill: 0    2. Asymptomatic HIV infection, with no history of HIV-related illness  -     Bictegravir-Emtricitab-Tenofov (Biktarvy) -25 MG per tablet; Take 1 tablet by mouth Daily.  Dispense: 90 tablet; Refill: 2    3. Iron deficiency anemia, unspecified iron deficiency anemia type  Comments:  He will be prescribed ferrous sulfate 325 mg; take 1 tablet by mouth every 8 hours as needed.   The patient will have laboratory workup done on 10/30/2023.  Orders:  -     ferrous sulfate 325 (65 FE) MG tablet; Take 1 tablet by mouth 2 (Two) Times a Day.  Dispense: 60 tablet; Refill: 4             Follow Up   No follow-ups on file.  Patient was given instructions and counseling regarding his condition or for health maintenance advice. Please see specific information pulled into the AVS if appropriate.       Answers submitted by the patient for this visit:  Primary Reason for Visit (Submitted on 2/6/2024)  What is the primary reason for your visit?: Other  Other (Submitted on 2/6/2024)  Please describe your symptoms.: Pain management!!!  Have you had these symptoms before?: Yes  How long have you been having these symptoms?: Greater than 2 weeks

## 2024-02-08 NOTE — ASSESSMENT & PLAN NOTE
He received the ganglion nerve block with symptoms.  He will follow-up with pain management to receive the second ganglion nerve block.  Patient is currently on gabapentin and oxycodone to help control his pain

## 2024-02-13 ENCOUNTER — OFFICE VISIT (OUTPATIENT)
Dept: PAIN MEDICINE | Facility: CLINIC | Age: 69
End: 2024-02-13
Payer: MEDICARE

## 2024-02-13 ENCOUNTER — TRANSCRIBE ORDERS (OUTPATIENT)
Dept: SURGERY | Facility: SURGERY CENTER | Age: 69
End: 2024-02-13
Payer: MEDICARE

## 2024-02-13 ENCOUNTER — PREP FOR SURGERY (OUTPATIENT)
Dept: SURGERY | Facility: SURGERY CENTER | Age: 69
End: 2024-02-13
Payer: MEDICARE

## 2024-02-13 VITALS
BODY MASS INDEX: 22.13 KG/M2 | OXYGEN SATURATION: 98 % | TEMPERATURE: 98 F | RESPIRATION RATE: 18 BRPM | HEIGHT: 74 IN | HEART RATE: 84 BPM | SYSTOLIC BLOOD PRESSURE: 134 MMHG | WEIGHT: 172.4 LBS | DIASTOLIC BLOOD PRESSURE: 80 MMHG

## 2024-02-13 DIAGNOSIS — Z41.9 SURGERY, ELECTIVE: Primary | ICD-10-CM

## 2024-02-13 DIAGNOSIS — B02.29 NEURALGIA, POST-HERPETIC: Primary | ICD-10-CM

## 2024-02-13 PROCEDURE — 99214 OFFICE O/P EST MOD 30 MIN: CPT | Performed by: PHYSICIAN ASSISTANT

## 2024-02-13 PROCEDURE — 1160F RVW MEDS BY RX/DR IN RCRD: CPT | Performed by: PHYSICIAN ASSISTANT

## 2024-02-13 PROCEDURE — 1159F MED LIST DOCD IN RCRD: CPT | Performed by: PHYSICIAN ASSISTANT

## 2024-02-13 PROCEDURE — 1125F AMNT PAIN NOTED PAIN PRSNT: CPT | Performed by: PHYSICIAN ASSISTANT

## 2024-02-13 RX ORDER — MIDAZOLAM HYDROCHLORIDE 2 MG/2ML
4 INJECTION, SOLUTION INTRAMUSCULAR; INTRAVENOUS ONCE
Status: COMPLETED | OUTPATIENT
Start: 2024-02-14 | End: 2024-02-14

## 2024-02-13 RX ORDER — SODIUM CHLORIDE 0.9 % (FLUSH) 0.9 %
10 SYRINGE (ML) INJECTION AS NEEDED
Status: CANCELLED | OUTPATIENT
Start: 2024-02-13

## 2024-02-13 RX ORDER — FENTANYL CITRATE 50 UG/ML
50 INJECTION, SOLUTION INTRAMUSCULAR; INTRAVENOUS ONCE
Status: COMPLETED | OUTPATIENT
Start: 2024-02-14 | End: 2024-02-14

## 2024-02-13 RX ORDER — SODIUM CHLORIDE 0.9 % (FLUSH) 0.9 %
10 SYRINGE (ML) INJECTION EVERY 12 HOURS SCHEDULED
Status: CANCELLED | OUTPATIENT
Start: 2024-02-13

## 2024-02-13 NOTE — SIGNIFICANT NOTE
Patient educated on the following :    - If you are receiving Sedation for your procedure Nothing to Eat 6 hours and only clear liquids for 2 hours prior to your procedure.     -You will need to have someone drive you home after your PROCEDURE and remain with you for 24 hours after the PROCEDURE  - The date of your procedure, your are welcome to have one visitor at bedside or remain within 10-15 minutes of [facility]  -You will need to arrive at 1430 on 2-14-24 for your PROCEDURE  -Please contact [facility] at: [phone] with any questions and/or concerns

## 2024-02-13 NOTE — H&P (VIEW-ONLY)
CHIEF COMPLAINT  Follow-up for Neuralgia.      Subjective   Beth Mckinney is a 68 y.o. male  who presents to the office for follow-up of procedure.  He completed a Left Stellate Ganglion Blockade  on  1/25/2024 performed by Dr. Wisdom for management of nerve pain. Patient reports 80% relief from the procedure for 10 days.  Patient states that the pain has resumed however it is not quite as severe in its intensity as it was initially.  Continues however to experience sharp and burning sensation which radiates superiorly over the left eye/brow and radiates superiorly into the temporal and frontal portion of the head.  Since having the injection however he feels that the majority of the pain is now more localized over the left eyebrow with some swelling.    Patient's medical history is complicated with HIV infection which is being managed by Dr. Andrew Don at [place] [facility].  He states that he is also under the care of Dr. Rik Velasquez for questionable rheumatoid arthritis versus pseudogout versus gout.     He is currently medically managed on Horizant 600 mg p.o. twice daily which has been somewhat helpful in the postherpetic neuralgia pain.  Is tolerating the medication without adverse effects.    Pain today 9/10 VAS in severity.    Pain  The current episode started more than 1 month ago. The problem occurs constantly. The problem has been unchanged. Associated symptoms include headaches. Pertinent negatives include no abdominal pain, fatigue, numbness or weakness. Associated symptoms comments: Eye pain--left. Nothing aggravates the symptoms. He has tried oral narcotics for the symptoms. The treatment provided no relief.        PEG Assessment   What number best describes your pain on average in the past week?8  What number best describes how, during the past week, pain has interfered with your enjoyment of life?10  What number best describes how, during the past week, pain has interfered  "with your general activity?  6    Review of Pertinent Medical Data ---      The following portions of the patient's history were reviewed and updated as appropriate: allergies, current medications, past family history, past medical history, past social history, past surgical history, and problem list.    Review of Systems   Constitutional:  Negative for fatigue.   Gastrointestinal:  Negative for abdominal pain, constipation and diarrhea.   Genitourinary:  Negative for difficulty urinating.   Neurological:  Positive for headaches. Negative for weakness and numbness.   Psychiatric/Behavioral:  Positive for sleep disturbance. Negative for suicidal ideas. The patient is not nervous/anxious.      I have reviewed and confirmed the accuracy of the ROS as documented by the MA/LPN/RN ROSALINDA Edwards   Vitals:    02/13/24 1136   BP: 134/80   Pulse: 84   Resp: 18   Temp: 98 °F (36.7 °C)   SpO2: 98%   Weight: 78.2 kg (172 lb 6.4 oz)   Height: 188 cm (74\")   PainSc:   9   PainLoc: Face         Objective   Physical Exam  Vitals and nursing note reviewed.   Constitutional:       General: He is in acute distress.      Appearance: Normal appearance. He is normal weight.      Comments: Appears to be in severe discomfort/pain during examination   HENT:      Head: Normocephalic.     Neurological:      Mental Status: He is alert and oriented to person, place, and time.      Cranial Nerves: Cranial nerves 2-12 are intact.      Sensory: Sensation is intact.      Motor: Motor function is intact.      Gait: Gait is intact.      Deep Tendon Reflexes:      Reflex Scores:       Tricep reflexes are 1+ on the right side and 1+ on the left side.       Bicep reflexes are 1+ on the right side and 1+ on the left side.       Brachioradialis reflexes are 1+ on the right side and 1+ on the left side.  Psychiatric:         Mood and Affect: Mood normal.         Behavior: Behavior normal.         Thought Content: Thought content normal.         " Judgment: Judgment normal.             Assessment & Plan   Diagnoses and all orders for this visit:    1. Neuralgia, post-herpetic (Primary)        Beth Mckinney reports a pain score of 9.  Given his pain assessment as noted, treatment options were discussed and the following options were decided upon as a follow-up plan to address the patient's pain: continuation of current treatment plan for pain, prescription for non-opiod analgesics, steroid injections, and use of non-medical modalities (ice, heat, stretching and/or behavior modifications).      --- Follow-up 4 weeks after the next injection  --- Consideration would be left trigeminal nerve block if pain continues to be persistent after the neck stellate ganglion block.  --- Continue Horizant 600 mg p.o. twice daily.  Does not require refill on today.             CAREN REPORT  As part of the patient's treatment plan, I am prescribing controlled substances. The patient has been made aware of appropriate use of such medications, including potential risk of somnolence, limited ability to drive and/or work safely, and the potential for dependence or overdose. It has also been made clear that these medications are for use by this patient only, without concomitant use of alcohol or other substances unless prescribed.     Patient has completed prescribing agreement detailing terms of continued prescribing of controlled substances, including monitoring CAREN reports, urine drug screening, and pill counts if necessary. The patient is aware that inappropriate use will results in cessation of prescribing such medications.    As the clinician, I personally reviewed the ACREN from 2/13/2024 while the patient was in the office today.    History and physical exam exhibit continued safe and appropriate use of controlled substances.       Dictated utilizing Dragon dictation.

## 2024-02-14 ENCOUNTER — HOSPITAL ENCOUNTER (OUTPATIENT)
Dept: GENERAL RADIOLOGY | Facility: SURGERY CENTER | Age: 69
Setting detail: HOSPITAL OUTPATIENT SURGERY
End: 2024-02-14
Payer: MEDICARE

## 2024-02-14 ENCOUNTER — HOSPITAL ENCOUNTER (OUTPATIENT)
Facility: SURGERY CENTER | Age: 69
Setting detail: HOSPITAL OUTPATIENT SURGERY
Discharge: HOME | End: 2024-02-14
Attending: ANESTHESIOLOGY | Admitting: ANESTHESIOLOGY
Payer: MEDICARE

## 2024-02-14 VITALS
OXYGEN SATURATION: 99 % | HEART RATE: 85 BPM | TEMPERATURE: 98.4 F | BODY MASS INDEX: 22.72 KG/M2 | RESPIRATION RATE: 16 BRPM | SYSTOLIC BLOOD PRESSURE: 151 MMHG | WEIGHT: 177 LBS | DIASTOLIC BLOOD PRESSURE: 97 MMHG | HEIGHT: 74 IN

## 2024-02-14 DIAGNOSIS — B02.29 NEURALGIA, POST-HERPETIC: ICD-10-CM

## 2024-02-14 DIAGNOSIS — Z41.9 SURGERY, ELECTIVE: ICD-10-CM

## 2024-02-14 LAB — GLUCOSE BLDC GLUCOMTR-MCNC: 93 MG/DL (ref 70–130)

## 2024-02-14 PROCEDURE — 77002 NEEDLE LOCALIZATION BY XRAY: CPT

## 2024-02-14 PROCEDURE — 76000 FLUOROSCOPY <1 HR PHYS/QHP: CPT

## 2024-02-14 PROCEDURE — 64510 N BLOCK STELLATE GANGLION: CPT | Performed by: ANESTHESIOLOGY

## 2024-02-14 PROCEDURE — 25010000002 MIDAZOLAM PER 1MG: Performed by: ANESTHESIOLOGY

## 2024-02-14 PROCEDURE — 25010000002 FENTANYL CITRATE (PF) 50 MCG/ML SOLUTION: Performed by: ANESTHESIOLOGY

## 2024-02-14 PROCEDURE — 25010000002 BUPIVACAINE (PF) 0.25 % SOLUTION 10 ML VIAL: Performed by: ANESTHESIOLOGY

## 2024-02-14 PROCEDURE — 25010000002 DEXAMETHASONE SODIUM PHOSPHATE 100 MG/10ML SOLUTION 10 ML VIAL: Performed by: ANESTHESIOLOGY

## 2024-02-14 PROCEDURE — 25510000001 IOPAMIDOL 61 % SOLUTION 30 ML VIAL: Performed by: ANESTHESIOLOGY

## 2024-02-14 RX ORDER — SODIUM CHLORIDE 0.9 % (FLUSH) 0.9 %
10 SYRINGE (ML) INJECTION AS NEEDED
Status: DISCONTINUED | OUTPATIENT
Start: 2024-02-14 | End: 2024-02-14 | Stop reason: HOSPADM

## 2024-02-14 RX ORDER — FENTANYL CITRATE 50 UG/ML
50 INJECTION, SOLUTION INTRAMUSCULAR; INTRAVENOUS ONCE
Status: DISCONTINUED | OUTPATIENT
Start: 2024-02-14 | End: 2024-02-14 | Stop reason: HOSPADM

## 2024-02-14 RX ORDER — SODIUM CHLORIDE 0.9 % (FLUSH) 0.9 %
10 SYRINGE (ML) INJECTION EVERY 12 HOURS SCHEDULED
Status: DISCONTINUED | OUTPATIENT
Start: 2024-02-14 | End: 2024-02-14 | Stop reason: HOSPADM

## 2024-02-14 RX ADMIN — MIDAZOLAM HYDROCHLORIDE 4 MG: 2 INJECTION, SOLUTION INTRAMUSCULAR; INTRAVENOUS at 16:24

## 2024-02-14 RX ADMIN — FENTANYL CITRATE 50 MCG: 0.05 INJECTION, SOLUTION INTRAMUSCULAR; INTRAVENOUS at 16:25

## 2024-02-14 NOTE — DISCHARGE INSTRUCTIONS
[facility] - Post-procedure Instructions          --  While there are no absolute restrictions, it is recommended that you do not perform strenuous activity today. In the morning, you may resume your level of activity as before your block.    --  If you have a band-aid at your injection site, please remove it later today. Observe the area for any redness, swelling, pus-like drainage, or a temperature over 101°. If any of these symptoms occur, please call your doctor at [phone]. If after office hours, leave a message and the on-call provider will return your call.    --  Ice may be applied to your injection site. It is recommended you avoid direct heat (heating pad; hot tub) for 1-2 days.    --  Call [facility] at [phone] if you experience persistent headache, persistent bleeding from the injection site, or severe pain not relieved by heat or oral medication.    --  Do not make important decisions today.    --  Due to the effects of the block and/or the I.V. Sedation, DO NOT drive or operate hazardous machinery for 12 hours.  Local anesthetics may cause numbness after procedure and precautions must be taken with regards to operating equipment as well as with walking, even if ambulating with assistance of another person or with an assistive device.    --  Do not drink alcohol for 12 hours.    -- You may return to work tomorrow, or as directed by your referring doctor.    --  Occasionally you may notice a slight increase in your pain after the procedure. This should start to improve within the next 24-48 hours. Radiofrequency ablation procedure pain may last 3-4 weeks.    --  It may take as long as 3-4 days before you notice a gradual improvement in your pain and/or other symptoms.    -- You may continue to take your prescribed pain medication as needed.    --  Some normal possible side effects of steroid use could include fluid retention, increased blood sugar, dull headache,  increased sweating, increased appetite, mood swings and flushing.    --  Diabetics are recommended to watch their blood glucose level closely for 24-48 hours after the injection.    --  Must stay in PACU for 20 min upon arrival and prove no leg weakness before being discharged.    --  IN THE EVENT OF A LIFE THREATENING EMERGENCY, (CHEST PAIN, BREATHING DIFFICULTIES, PARALYSIS…) YOU SHOULD GO TO YOUR NEAREST EMERGENCY ROOM.    --  You should be contacted by our office within 2-3 days to schedule follow up or next appointment date.  If not contacted within 7 days, please call the office at [phone]

## 2024-02-14 NOTE — OP NOTE
Left Stellate Ganglion Blockade (Left SGB)  [facility]     PREOPERATIVE DIAGNOSIS:         Postherpetic neuralgia with sympathetically mediated pain in the left frontal area and the left orbital area and left temporal area.  Including V1 dermatomal distribution.     POSTOPERATIVE DIAGNOSIS:       Same as preoperative dx     PROCEDURE:  Stellate Ganglion Block, on the LEFT, with fluoroscopy  CPT 95676, Cervical Sympathetic Blockade     PRE-PROCEDURE DISCUSSION WITH PATIENT:    Risks and complications were discussed with the patient prior to starting the procedure and informed consent was obtained.   Risks were discussed, including but not limited to recurrent laryngeal nerve paralysis, phrenic nerve paresis, upper extremity somatic blocks including possible complete brachial plexus blockade, neuraxial block, intravascular injection and risk of seizures, and pneumothorax.   Also discussed were signs of a successful SGB, including Dipti Syndrome, nasal congestion, vasodilation in the upper extremity and temperature increase in the upper extremity.     SURGEON:  Justin Wisdom MD     REASON FOR PROCEDURE:    This patient has spreading of pain that is concerning for progression &/or migration of sympathetic dystrophy.  History of shingles, persistent sharp and burning sensations neuropathic pain but also a lot of sensitivity.  He had 80% relief for 10 days after previous block therefore repeating the block is indicated.     SEDATION:     Versed 4mg & Fentanyl 50 mcg IV     LOCAL ANESTHETIC:           Marcaine 0.5% - 2.5ml  STEROID:                               15mg dexamethasone              TOTAL VOLUME OF SOLUTION:     4 mL     DESCRIPTON OF PROCEDURE:     After obtaining informed consent, I.V. was started in the preop area.   The patient was taken to the operating room and placed in the supine position with neck alignment in a neutral position.  EKG, blood pressure, and pulse oximeter were  monitored throughout, and sedation was provided as needed by the RN under my guidance. All pressure points were well padded.       The C-arm was placed over the patient and a true AP view was obtained.  The [place]’s Tubercle was identified at C6 and then identified was the uncinate process of C7 on the aforementioned side.  A point just inferior to the uncinate process of C7 was identified, and a line was identified connecting the uncinate processes of C7 & T1.  On that line, just inferior to the uncinate process of C7, that point was marked on the skin and anesthetized with subcutaneous local anesthetic.  Palpation was performed to ensure that this point was medial to the carotid artery, and then a 25-gauge needle was inserted perpendicularly in a coaxial plane until contacting the vertebral body at that point.       Aspiration was confirmed to be negative.  Next, 1.5ml of contrast dye was injected, and was seen to spread along the anterolateral margin of the vertebral body on the ipsilateral side.    Anterolateral placement of the dye was confirmed in a lateral view, and the needle tip was seen to be lying along the anterolateral surface of the cervical vertebra.  The aforementioned local anesthetic solution was then slowly injected in 1ml increments with frequent confirmation of negative aspiration.  The needle was then removed intact.  Vital signs remained stable throughout.       ESTIMATED BLOOD LOSS:  <5 mL  SPECIMENS:  None     COMPLICATIONS:     On needle placement initially he had paresthetic symptoms in the left arm.  There was no visual indication of cervical root irritation nor contrast spread in this vicinity.  There was no indication of vascular uptake on live injection of contrast dye., and The patient did not have any signs of postprocedure numbness nor weakness.     TOLERANCE & DISCHARGE CONDITION:    The patient tolerated the procedure well.  The patient was transported to the recovery area  without difficulties.  The patient was monitored for at least 30 minutes after the procedure, and temperature increase in the ipsilateral upper extremity was noted.  The patient was discharged to home under the care of family in stable and satisfactory condition.     PLAN OF CARE:  The patient was given our standard instruction sheet.  The patient will Return to clinic 2-3 wks. if he is not experiencing extended amount of relief after the second block then I would not plan to perform further series of blocks and plan to to consider other options  The patient will resume all medications as per the medication reconciliation sheet.

## 2024-02-18 ENCOUNTER — DOCUMENTATION (OUTPATIENT)
Dept: PAIN MEDICINE | Facility: CLINIC | Age: 69
End: 2024-02-18
Payer: MEDICARE

## 2024-02-18 NOTE — PROGRESS NOTES
Patient called on call service on 2/18/24 at 8:24pm with complaints on increased pain following a left stellate ganglion block performed by Dr. Wisdom on 2/14/224. He reported intermittent nausea and diarrhea following the procedure but those symptoms have subsided. He notes increased forehead and eyelid pain despite taking Oxycodone and Tylenol. Denies site swelling, fever, chills, or extremity weakness. He states pain is worse then before the procedure. He denied vision changes and last BP reading was 120/74. I discussed with patient that it can take some time for the steroid mediation medication to become effective. He denied the need to be evaluated in the ED. Encouraged patient to continue with heat/ice, Oxycodone PRN, and Tylenol PRN. He verbalized understanding.

## 2024-02-23 ENCOUNTER — OFFICE VISIT (OUTPATIENT)
Dept: FAMILY MEDICINE CLINIC | Facility: CLINIC | Age: 69
End: 2024-02-23
Payer: MEDICARE

## 2024-02-23 VITALS
WEIGHT: 176 LBS | DIASTOLIC BLOOD PRESSURE: 60 MMHG | BODY MASS INDEX: 22.59 KG/M2 | HEIGHT: 74 IN | SYSTOLIC BLOOD PRESSURE: 120 MMHG | TEMPERATURE: 97.3 F | RESPIRATION RATE: 18 BRPM | HEART RATE: 86 BPM | OXYGEN SATURATION: 98 %

## 2024-02-23 DIAGNOSIS — R11.2 NAUSEA AND VOMITING IN ADULT: ICD-10-CM

## 2024-02-23 DIAGNOSIS — B02.29 NEURALGIA, POST-HERPETIC: Primary | ICD-10-CM

## 2024-02-23 PROCEDURE — 99214 OFFICE O/P EST MOD 30 MIN: CPT | Performed by: FAMILY MEDICINE

## 2024-02-23 RX ORDER — ONDANSETRON 4 MG/1
4 TABLET, FILM COATED ORAL EVERY 8 HOURS PRN
Qty: 30 TABLET | Refills: 0 | Status: SHIPPED | OUTPATIENT
Start: 2024-02-23

## 2024-02-23 NOTE — PROGRESS NOTES
"Chief Complaint  Chief Complaint   Patient presents with    Procedure     Pt here for post op care     referral      Pt here to get referral to neurologist        Subjective    History of Present Illness        Beth Mckinney presents to [facility] for   History of Present Illness  Patient is a 68-year-old male is being seen in follow-up for nausea, vomiting and neuralgia postherpetic due to shingles. Patient has had two stellate ganglion nerve blocks.  The first block.  It worked really well patient was satisfied.  The second block did not work well and patient stated he had more pain.  Patient states he wants a referral to neurology to help with his symptoms.       Objective   Vital Signs:   Visit Vitals  /60   Pulse 86   Temp 97.3 °F (36.3 °C)   Resp 18   Ht 188 cm (74\")   Wt 79.8 kg (176 lb)   SpO2 98%   BMI 22.60 kg/m²          BMI is within normal parameters. No other follow-up for BMI required.     Physical Exam  Vitals reviewed.   Constitutional:       Appearance: He is well-developed.   HENT:      Head: Normocephalic.      Right Ear: External ear normal.      Left Ear: External ear normal.      Nose: Nose normal.   Eyes:      Conjunctiva/sclera: Conjunctivae normal.   Cardiovascular:      Rate and Rhythm: Normal rate and regular rhythm.   Pulmonary:      Effort: Pulmonary effort is normal.      Breath sounds: Normal breath sounds.   Musculoskeletal:         General: Normal range of motion.      Cervical back: Normal range of motion and neck supple.   Skin:     General: Skin is warm and dry.      Capillary Refill: Capillary refill takes less than 2 seconds.          Neurological:      Mental Status: He is alert and oriented to person, place, and time.            Result Review :                    Assessment and Plan      Diagnoses and all orders for this visit:    1. Neuralgia, post-herpetic (Primary)  Assessment & Plan:  Despite being treated with nerve blocks " his symptoms are still persisting  Pain management records have been reviewed.  Patient was given a prescription for diclofenac to help treat his symptoms.  Patient was referred to neurology for further evaluation and treatment.        Orders:  -     Ambulatory Referral to Neurology  -     Discontinue: diclofenac (VOLTAREN) 50 MG EC tablet; Take 1 tablet by mouth 2 (Two) Times a Day.  Dispense: 60 tablet; Refill: 3    2. Nausea and vomiting in adult  Assessment & Plan:  Patient was given prescription for prednisone to treat his symptoms.  Patient was encouraged return to clinic if symptoms do not improve.    Orders:  -     ondansetron (ZOFRAN) 4 MG tablet; Take 1 tablet by mouth Every 8 (Eight) Hours As Needed for Nausea or Vomiting.  Dispense: 30 tablet; Refill: 0             Follow Up   No follow-ups on file.  Patient was given instructions and counseling regarding his condition or for health maintenance advice. Please see specific information pulled into the AVS if appropriate.       Answers submitted by the patient for this visit:  Primary Reason for Visit (Submitted on 2/22/2024)  What is the primary reason for your visit?: Other  Other (Submitted on 2/22/2024)  Please describe your symptoms.: Continued pain supposedly resulting from Shingles 9/4/2023 post hepectic neuralgia! I also need a referral to a Neurologist! I have found one willing to accept me asap!  Have you had these symptoms before?: Yes  How long have you been having these symptoms?: Greater than 2 weeks

## 2024-03-04 ENCOUNTER — TELEPHONE (OUTPATIENT)
Dept: PAIN MEDICINE | Facility: CLINIC | Age: 69
End: 2024-03-04

## 2024-03-04 NOTE — TELEPHONE ENCOUNTER
"  Caller: Beth Mckinney \"Col. Mckinney\"    Relationship to patient: Self    Best call back number: [phone]    Chief complaint: LEFT SIDE FOREHEAD     Type of visit: FOLLOW UP     Requested date: TODAY      If rescheduling, when is the original appointment: PATIENT WOULD LIKE TO DO A TELEHEALTH VISIT TODAY DUE TO NO TRANSPORTATION. PLEASE CALL TO LET HIM KNOW IF THIS IS AN OPTION OR HE WILL NEED TO RESCHEDULE.      "

## 2024-03-13 ENCOUNTER — OFFICE VISIT (OUTPATIENT)
Dept: PAIN MEDICINE | Facility: CLINIC | Age: 69
End: 2024-03-13
Payer: MEDICARE

## 2024-03-13 VITALS
SYSTOLIC BLOOD PRESSURE: 118 MMHG | WEIGHT: 168.6 LBS | TEMPERATURE: 96.9 F | BODY MASS INDEX: 21.64 KG/M2 | OXYGEN SATURATION: 97 % | HEIGHT: 74 IN | HEART RATE: 88 BPM | DIASTOLIC BLOOD PRESSURE: 75 MMHG | RESPIRATION RATE: 18 BRPM

## 2024-03-13 DIAGNOSIS — B02.29 NEURALGIA, POST-HERPETIC: Primary | ICD-10-CM

## 2024-03-13 DIAGNOSIS — B02.29 POST HERPETIC NEURALGIA: ICD-10-CM

## 2024-03-13 PROCEDURE — 1159F MED LIST DOCD IN RCRD: CPT | Performed by: PHYSICIAN ASSISTANT

## 2024-03-13 PROCEDURE — 1160F RVW MEDS BY RX/DR IN RCRD: CPT | Performed by: PHYSICIAN ASSISTANT

## 2024-03-13 PROCEDURE — 1125F AMNT PAIN NOTED PAIN PRSNT: CPT | Performed by: PHYSICIAN ASSISTANT

## 2024-03-13 PROCEDURE — 99214 OFFICE O/P EST MOD 30 MIN: CPT | Performed by: PHYSICIAN ASSISTANT

## 2024-03-13 RX ORDER — TRAMADOL HYDROCHLORIDE 50 MG/1
50 TABLET ORAL EVERY 6 HOURS PRN
Qty: 120 TABLET | Refills: 0 | Status: SHIPPED | OUTPATIENT
Start: 2024-03-13

## 2024-03-13 RX ORDER — IBUPROFEN 800 MG/1
800 TABLET ORAL 2 TIMES DAILY
Qty: 60 TABLET | Refills: 0 | Status: SHIPPED | OUTPATIENT
Start: 2024-03-13

## 2024-03-13 NOTE — PROGRESS NOTES
CHIEF COMPLAINT  Neuralgia, post-herpetic       Subjective   Beth Mckinney is a 68 y.o. male  who presents to the office for follow-up of procedure.  He completed a STELLATE GANGLION NERVE BLOCK #2    on  2/14/24 performed by Dr. Wisdom for management of Neuralgia, post-herpetic . Patient reports 0% relief from the procedure actually contacted the after-hours service with report of increased pain.  Pain has since returned to his normal baseline levels.  This patient continues with pain which radiates superiorly over the left eyebrow radiating superiorly into the temporal and frontal portion of the head.  Continue with blurred vision of the eye as well as intermittent swelling that he is under the care of his ophthalmologist for.    Patient's medical history is complicated with HIV infection which is being managed by Dr. Andrew Don at [facility].  He states that he is also under the care of Dr. Rik Velasquez for questionable rheumatoid arthritis versus pseudogout versus gout.      He is currently on Horizant 600 mg p.o. twice daily which has been helpful with the postherpetic neuralgia mostly.  He has been taking oxycodone prescribed by his PCP, Dr. Donovan and states that he does not feel that the oxycodone has been helpful.  The patient states that he actually feels that he obtains a better relief when taking tramadol while in the hospital.    Pain today 9/10 VAS in severity.      Pain  The current episode started more than 1 month ago. The problem occurs constantly. The problem has been unchanged. Associated symptoms include fatigue and headaches. Pertinent negatives include no abdominal pain, numbness or weakness. Associated symptoms comments: Eye pain--left. Nothing aggravates the symptoms. He has tried oral narcotics for the symptoms. The treatment provided no relief.        PEG Assessment   What number best describes your pain on average in the past week?8-9  What number best  "describes how, during the past week, pain has interfered with your enjoyment of life?8-9  What number best describes how, during the past week, pain has interfered with your general activity?  8-9    Review of Pertinent Medical Data ---      The following portions of the patient's history were reviewed and updated as appropriate: allergies, current medications, past family history, past medical history, past social history, past surgical history, and problem list.    Review of Systems   Constitutional:  Positive for activity change and fatigue.   Gastrointestinal:  Negative for abdominal pain, constipation and diarrhea.   Genitourinary:  Negative for difficulty urinating and dysuria.   Neurological:  Positive for headaches. Negative for dizziness, weakness, light-headedness and numbness.   Psychiatric/Behavioral:  Positive for agitation and sleep disturbance. Negative for suicidal ideas. The patient is nervous/anxious.      I have reviewed and confirmed the accuracy of the ROS as documented by the MA/LPN/RN ROSALINDA Edwards   Vitals:    03/13/24 1436   BP: 118/75   BP Location: Right arm   Patient Position: Sitting   Cuff Size: Adult   Pulse: 88   Resp: 18   Temp: 96.9 °F (36.1 °C)   SpO2: 97%   Weight: 76.5 kg (168 lb 9.6 oz)   Height: 188 cm (74\")   PainSc:   9         Objective   Physical Exam  Vitals and nursing note reviewed.   Constitutional:       General: He is in acute distress.      Appearance: Normal appearance. He is normal weight.      Comments: Appears to be in severe discomfort/pain during examination   HENT:      Head: Normocephalic.     Neurological:      Mental Status: He is alert and oriented to person, place, and time.      Cranial Nerves: Cranial nerves 2-12 are intact.      Sensory: Sensation is intact.      Motor: Motor function is intact.      Gait: Gait is intact.      Deep Tendon Reflexes:      Reflex Scores:       Tricep reflexes are 1+ on the right side and 1+ on the left side.       " Bicep reflexes are 1+ on the right side and 1+ on the left side.       Brachioradialis reflexes are 1+ on the right side and 1+ on the left side.  Psychiatric:         Mood and Affect: Mood normal.         Behavior: Behavior normal.         Thought Content: Thought content normal.         Judgment: Judgment normal.             Assessment & Plan   Diagnoses and all orders for this visit:    1. Neuralgia, post-herpetic (Primary)  -     ibuprofen (ADVIL,MOTRIN) 800 MG tablet; Take 1 tablet by mouth 2 (Two) Times a Day.  Dispense: 60 tablet; Refill: 0  -     traMADol (ULTRAM) 50 MG tablet; Take 1 tablet by mouth Every 6 (Six) Hours As Needed for Moderate Pain.  Dispense: 120 tablet; Refill: 0    2. Post herpetic neuralgia  -     Gabapentin Enacarbil  MG tablet controlled-release; Take 600 mg by mouth 2 (Two) Times a Day.  Dispense: 60 tablet; Refill: 1        Beth Mckinney reports a pain score of 9.  Given his pain assessment as noted, treatment options were discussed and the following options were decided upon as a follow-up plan to address the patient's pain: continuation of current treatment plan for pain, prescription for non-opiod analgesics, prescription for opiod analgesics, and use of non-medical modalities (ice, heat, stretching and/or behavior modifications).      --- Follow-up in 4 weeks or sooner for further evaluation  --- Future consideration would be left trigeminal nerve block  --- Continue Horizant 600 mg p.o. twice daily.  I am hesitant to proceed with a trial of Gralise 600 mg p.o. 4 times daily the patient had severe hallucinations and dyspepsia with the use of gabapentin.  --- The patient would like to proceed with a trial of tramadol which I feel is agreeable therefore he will discontinue oxycodone and I will send in a prescription for tramadol 50 mg p.o. 4 times daily  --- He also feels that he obtain better relief with ibuprofen 800 mg therefore I will send in a prescription for  twice daily dosing but he will have to discontinue the Voltaren.  He verbalizes understanding.               CAREN REPORT  As part of the patient's treatment plan, I am prescribing controlled substances. The patient has been made aware of appropriate use of such medications, including potential risk of somnolence, limited ability to drive and/or work safely, and the potential for dependence or overdose. It has also been made clear that these medications are for use by this patient only, without concomitant use of alcohol or other substances unless prescribed.     Patient has completed prescribing agreement detailing terms of continued prescribing of controlled substances, including monitoring CAREN reports, urine drug screening, and pill counts if necessary. The patient is aware that inappropriate use will results in cessation of prescribing such medications.    As the clinician, I personally reviewed the CAREN from 3/13/2024 while the patient was in the office today.    History and physical exam exhibit continued safe and appropriate use of controlled substances.       Dictated utilizing Dragon dictation.

## 2024-03-17 PROBLEM — R11.2 NAUSEA AND VOMITING IN ADULT: Status: ACTIVE | Noted: 2024-03-17

## 2024-03-17 NOTE — ASSESSMENT & PLAN NOTE
Patient was given prescription for prednisone to treat his symptoms.  Patient was encouraged return to clinic if symptoms do not improve.

## 2024-03-17 NOTE — ASSESSMENT & PLAN NOTE
Despite being treated with nerve blocks his symptoms are still persisting  Pain management records have been reviewed.  Patient was given a prescription for diclofenac to help treat his symptoms.  Patient was referred to neurology for further evaluation and treatment.

## 2024-04-10 ENCOUNTER — OFFICE VISIT (OUTPATIENT)
Dept: PAIN MEDICINE | Facility: CLINIC | Age: 69
End: 2024-04-10
Payer: MEDICARE

## 2024-04-10 VITALS
DIASTOLIC BLOOD PRESSURE: 72 MMHG | HEIGHT: 74 IN | SYSTOLIC BLOOD PRESSURE: 139 MMHG | WEIGHT: 167.2 LBS | RESPIRATION RATE: 12 BRPM | TEMPERATURE: 97.1 F | HEART RATE: 79 BPM | BODY MASS INDEX: 21.46 KG/M2 | OXYGEN SATURATION: 97 %

## 2024-04-10 DIAGNOSIS — B02.29 POST HERPETIC NEURALGIA: Primary | ICD-10-CM

## 2024-04-10 DIAGNOSIS — B02.29 NEURALGIA, POST-HERPETIC: ICD-10-CM

## 2024-04-10 DIAGNOSIS — G89.4 CHRONIC PAIN SYNDROME: ICD-10-CM

## 2024-04-10 DIAGNOSIS — Z79.899 ENCOUNTER FOR LONG-TERM (CURRENT) USE OF HIGH-RISK MEDICATION: ICD-10-CM

## 2024-04-10 PROCEDURE — 1125F AMNT PAIN NOTED PAIN PRSNT: CPT | Performed by: PHYSICIAN ASSISTANT

## 2024-04-10 PROCEDURE — 1160F RVW MEDS BY RX/DR IN RCRD: CPT | Performed by: PHYSICIAN ASSISTANT

## 2024-04-10 PROCEDURE — 99214 OFFICE O/P EST MOD 30 MIN: CPT | Performed by: PHYSICIAN ASSISTANT

## 2024-04-10 PROCEDURE — 1159F MED LIST DOCD IN RCRD: CPT | Performed by: PHYSICIAN ASSISTANT

## 2024-04-10 RX ORDER — TRAMADOL HYDROCHLORIDE 50 MG/1
50-100 TABLET ORAL 3 TIMES DAILY PRN
Qty: 180 TABLET | Refills: 0 | Status: SHIPPED | OUTPATIENT
Start: 2024-04-10

## 2024-04-10 RX ORDER — OXYCODONE AND ACETAMINOPHEN 10; 325 MG/1; MG/1
1 TABLET ORAL EVERY 8 HOURS PRN
Qty: 90 TABLET | Refills: 0 | OUTPATIENT
Start: 2024-04-10

## 2024-04-10 RX ORDER — DULOXETIN HYDROCHLORIDE 20 MG/1
20 CAPSULE, DELAYED RELEASE ORAL DAILY
Qty: 30 CAPSULE | Refills: 0 | Status: SHIPPED | OUTPATIENT
Start: 2024-04-10

## 2024-04-10 NOTE — PROGRESS NOTES
CHIEF COMPLAINT  Neuralgia   Pt reports worsened pain since last OV.    Subjective   Beth cMkinney is a 68 y.o. male  who presents for follow-up.  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  This patient continues with reported pain which radiates superiorly over the left eyebrow extending superiorly into the temporal and frontal portions of the head.  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    Patient has undergone stellate ganglion nerve block on 2/14/2024 which actually increased the pain.    Patient's medical history is complicated with HIV infection which is being managed by Dr. Andrew Don at [facility].  He states that he is also under the care of Dr. Rik Velasquez for questionable rheumatoid arthritis versus pseudogout versus gout.      Current medication regimen consists of tramadol 50 mg p.o. 4 times daily and arises 600 mg p.o. twice daily which has been somewhat helpful with the postherpetic neuralgia pain.  The patient feels that the tramadol was more helpful than the oxycodone but he is having to take on occasion up to 6 tablets within a 24-hour period for significant relief.  Denies adverse effects.    Pain today 9/10 VAS in severity.      Pain  The current episode started more than 1 month ago. The problem occurs constantly. The problem has been unchanged. Associated symptoms include headaches and weakness. Pertinent negatives include no abdominal pain, chest pain, congestion, coughing, fatigue, fever or numbness. Associated symptoms comments: Eye pain--left. Nothing aggravates the symptoms. He has tried oral narcotics for the symptoms. The treatment provided no relief.        PEG Assessment   What number best describes your pain on average in the  "past week?9  What number best describes how, during the past week, pain has interfered with your enjoyment of life?9  What number best describes how, during the past week, pain has interfered with your general activity?  9    Review of Pertinent Medical Data ---  No new imaging to review    The following portions of the patient's history were reviewed and updated as appropriate: allergies, current medications, past family history, past medical history, past social history, past surgical history, and problem list.    Review of Systems   Constitutional:  Negative for activity change (less), fatigue and fever.   HENT:  Negative for congestion.    Respiratory:  Negative for cough and chest tightness.    Cardiovascular:  Negative for chest pain.   Gastrointestinal:  Negative for abdominal pain, constipation and diarrhea.   Genitourinary:  Negative for difficulty urinating and dysuria.   Neurological:  Positive for dizziness, weakness, light-headedness and headaches. Negative for numbness.   Psychiatric/Behavioral:  Positive for sleep disturbance. Negative for agitation and suicidal ideas. The patient is not nervous/anxious.      I have reviewed and confirmed the accuracy of the ROS as documented by the MA/LPN/RN ROSALINDA Edwards  Vitals:    04/10/24 1431   BP: 139/72   BP Location: Left arm   Patient Position: Sitting   Cuff Size: Adult   Pulse: 79   Resp: 12   Temp: 97.1 °F (36.2 °C)   TempSrc: Temporal   SpO2: 97%   Weight: 75.8 kg (167 lb 3.2 oz)   Height: 188 cm (74\")   PainSc:   9         Objective   Physical Exam  Vitals and nursing note reviewed.   Constitutional:       General: He is in acute distress.      Appearance: Normal appearance. He is normal weight.      Comments: Appears to be in severe discomfort/pain during examination   HENT:      Head: Normocephalic.     Neurological:      Mental Status: He is alert and oriented to person, place, and time.      Cranial Nerves: Cranial nerves 2-12 are intact.      " Sensory: Sensation is intact.      Motor: Motor function is intact.      Gait: Gait is intact.   Psychiatric:         Mood and Affect: Mood normal.         Behavior: Behavior normal.         Thought Content: Thought content normal.         Judgment: Judgment normal.             Assessment & Plan   Diagnoses and all orders for this visit:    1. Post herpetic neuralgia (Primary)  -     DULoxetine (CYMBALTA) 20 MG capsule; Take 1 capsule by mouth Daily.  Dispense: 30 capsule; Refill: 0  -     Gabapentin Enacarbil  MG tablet controlled-release; Take 1 Tablet by mouth 2 (Two) Times a Day.  Dispense: 60 tablet; Refill: 1  -     traMADol (ULTRAM) 50 MG tablet; Take 1-2 tablets PO TID prn pain  Dispense: 180 tablet; Refill: 0    2. Neuralgia, post-herpetic    3. Encounter for long-term (current) use of high-risk medication  -     traMADol (ULTRAM) 50 MG tablet; Take 1-2 tablets PO TID prn pain  Dispense: 180 tablet; Refill: 0    4. Chronic pain syndrome        Beth Mckinney reports a pain score of 9.  Given his pain assessment as noted, treatment options were discussed and the following options were decided upon as a follow-up plan to address the patient's pain: continuation of current treatment plan for pain, prescription for non-opiod analgesics, prescription for opiod analgesics, and use of non-medical modalities (ice, heat, stretching and/or behavior modifications).      --- Follow-up in 4 weeks or sooner for further evaluation  --- Girard a trial of duloxetine 20 mg p.o. daily.  The patient tends to be relatively sensitive to certain medications therefore I will initiate a low-dose trial initially.  Risk and benefits of use of antidepressant medications has been discussed with the patient and they verbalized understanding that any worsening of depressive symptoms or thoughts of suicide that the medication should immediately be discontinued.  Patient has also been advised that if that should arise  they should report to the emergency room for further evaluation.    --- I will increase tramadol 50 mg to 1 or 2 p.o. 3 times daily as necessary for increased pain.  --- Continue with Horizant 600 mg p.o. twice daily.  The patient has failed previous trials of gabapentin and pregabalin causing severe nausea, dyspepsia as well as hallucinations.           CAREN REPORT  As part of the patient's treatment plan, I am prescribing controlled substances. The patient has been made aware of appropriate use of such medications, including potential risk of somnolence, limited ability to drive and/or work safely, and the potential for dependence or overdose. It has also been made clear that these medications are for use by this patient only, without concomitant use of alcohol or other substances unless prescribed.     Patient has completed prescribing agreement detailing terms of continued prescribing of controlled substances, including monitoring CAREN reports, urine drug screening, and pill counts if necessary. The patient is aware that inappropriate use will results in cessation of prescribing such medications.    As the clinician, I personally reviewed the CAREN from 4/10/2024 while the patient was in the office today.    History and physical exam exhibit continued safe and appropriate use of controlled substances.     Dictated utilizing Dragon dictation.

## 2024-04-11 DIAGNOSIS — B02.29 NEURALGIA, POST-HERPETIC: ICD-10-CM

## 2024-04-11 RX ORDER — IBUPROFEN 800 MG/1
800 TABLET ORAL 2 TIMES DAILY
Qty: 60 TABLET | Refills: 0 | Status: SHIPPED | OUTPATIENT
Start: 2024-04-11

## 2024-04-12 ENCOUNTER — OFFICE VISIT (OUTPATIENT)
Dept: FAMILY MEDICINE CLINIC | Facility: CLINIC | Age: 69
End: 2024-04-12
Payer: MEDICARE

## 2024-04-12 VITALS
OXYGEN SATURATION: 96 % | HEIGHT: 74 IN | DIASTOLIC BLOOD PRESSURE: 54 MMHG | HEART RATE: 73 BPM | SYSTOLIC BLOOD PRESSURE: 116 MMHG | BODY MASS INDEX: 21.3 KG/M2 | WEIGHT: 166 LBS

## 2024-04-12 DIAGNOSIS — Z02.89 PAIN MEDICATION AGREEMENT: ICD-10-CM

## 2024-04-12 DIAGNOSIS — D50.9 IRON DEFICIENCY ANEMIA, UNSPECIFIED IRON DEFICIENCY ANEMIA TYPE: ICD-10-CM

## 2024-04-12 DIAGNOSIS — B02.29 NEURALGIA, POST-HERPETIC: Primary | ICD-10-CM

## 2024-04-12 DIAGNOSIS — Z02.89 MEDICATION MANAGEMENT CONTRACT AGREEMENT: ICD-10-CM

## 2024-04-12 LAB
POC AMPHETAMINES: NEGATIVE
POC BARBITURATES: NEGATIVE
POC BENZODIAZEPHINES: NEGATIVE
POC COCAINE: NEGATIVE
POC METHADONE: NEGATIVE
POC METHAMPHETAMINE SCREEN URINE: NEGATIVE
POC OPIATES: NEGATIVE
POC OXYCODONE: POSITIVE
POC PHENCYCLIDINE: NEGATIVE
POC PROPOXYPHENE: NEGATIVE
POC THC: NEGATIVE
POC TRICYCLIC ANTIDEPRESSANTS: NEGATIVE

## 2024-04-12 RX ORDER — OXYCODONE AND ACETAMINOPHEN 10; 325 MG/1; MG/1
1 TABLET ORAL EVERY 8 HOURS PRN
Qty: 90 TABLET | Refills: 0 | Status: SHIPPED | OUTPATIENT
Start: 2024-04-12 | End: 2024-05-16

## 2024-04-12 NOTE — PROGRESS NOTES
"Chief Complaint  Chief Complaint   Patient presents with    Fatigue     Trouble breathing and pain in forehead and left shoulder.        Subjective    History of Present Illness        Beth Mckinney presents to [facility] for   History of Present Illness  Patient is a 68-year-old male is being seen in the clinic for recurrent postherpetic pain.  Patient has been having this pain since before October 2023.  Patient is being treated for postherpetic pain despite being treated by pain management still causing him discomfort and pain.  Fatigue  This is a recurrent problem. The current episode started more than 1 month ago. The problem occurs constantly. Associated symptoms include fatigue, headaches and myalgias. Pertinent negatives include no abdominal pain, anorexia, change in bowel habit, chest pain, chills, congestion, urinary symptoms, vertigo or visual change. Nothing aggravates the symptoms. He has tried nothing for the symptoms. The treatment provided no relief.        Objective   Vital Signs:   Visit Vitals  /54 (BP Location: Right arm, Patient Position: Sitting, Cuff Size: Adult)   Pulse 73   Ht 188 cm (74\")   Wt 75.3 kg (166 lb)   SpO2 96%   BMI 21.31 kg/m²          BMI is within normal parameters. No other follow-up for BMI required.     Physical Exam  Vitals reviewed.   Constitutional:       Appearance: He is well-developed.   HENT:      Head: Normocephalic and atraumatic.      Right Ear: External ear normal.      Left Ear: External ear normal.      Nose: Nose normal.   Eyes:      Conjunctiva/sclera: Conjunctivae normal.   Cardiovascular:      Rate and Rhythm: Normal rate and regular rhythm.      Heart sounds: Normal heart sounds.   Pulmonary:      Effort: Pulmonary effort is normal.      Breath sounds: Normal breath sounds.   Musculoskeletal:         General: Normal range of motion.      Left shoulder: Tenderness present. Decreased range of motion.      " Cervical back: Normal range of motion and neck supple.   Skin:     General: Skin is warm and dry.      Capillary Refill: Capillary refill takes less than 2 seconds.   Neurological:      Mental Status: He is alert and oriented to person, place, and time.            Result Review :                    Assessment and Plan      Diagnoses and all orders for this visit:    1. Neuralgia, post-herpetic (Primary)  Assessment & Plan:  Patient was given a refill of oxycodone to help control his pain.  No change in treatment at this time.    Orders:  -     oxyCODONE-acetaminophen (PERCOCET)  MG per tablet; Take 1 tablet by mouth Every 8 (Eight) Hours As Needed for Moderate Pain for up to 34 days.  Dispense: 90 tablet; Refill: 0    2. Iron deficiency anemia, unspecified iron deficiency anemia type  Assessment & Plan:  Patient's fatigue may likely be coming due to him having anemia.  CBC ordered results pending.    Orders:  -     CBC & Differential    3. Medication management contract agreement    4. Pain medication agreement  -     POC Urine Drug Screen, Triage         I spent 30 minutes caring for Beth on this date of service. This time includes time spent by me in the following activities:preparing for the visit, reviewing tests, ordering medications, tests, or procedures, documenting information in the medical record, and care coordination    Follow Up   No follow-ups on file.  Patient was given instructions and counseling regarding his condition or for health maintenance advice. Please see specific information pulled into the AVS if appropriate.

## 2024-04-27 PROBLEM — B02.29 NEURALGIA, POST-HERPETIC: Status: ACTIVE | Noted: 2024-04-27

## 2024-04-28 NOTE — ASSESSMENT & PLAN NOTE
Patient was given a refill of oxycodone to help control his pain.  No change in treatment at this time.

## 2024-05-09 ENCOUNTER — OFFICE VISIT (OUTPATIENT)
Dept: PAIN MEDICINE | Facility: CLINIC | Age: 69
End: 2024-05-09
Payer: MEDICARE

## 2024-05-09 VITALS
WEIGHT: 165.8 LBS | OXYGEN SATURATION: 98 % | DIASTOLIC BLOOD PRESSURE: 78 MMHG | HEIGHT: 74 IN | BODY MASS INDEX: 21.28 KG/M2 | HEART RATE: 67 BPM | SYSTOLIC BLOOD PRESSURE: 153 MMHG | TEMPERATURE: 96.9 F

## 2024-05-09 DIAGNOSIS — B02.29 POST HERPETIC NEURALGIA: ICD-10-CM

## 2024-05-09 DIAGNOSIS — Z79.899 ENCOUNTER FOR LONG-TERM (CURRENT) USE OF HIGH-RISK MEDICATION: ICD-10-CM

## 2024-05-09 DIAGNOSIS — B02.29 NEURALGIA, POST-HERPETIC: ICD-10-CM

## 2024-05-09 DIAGNOSIS — G89.4 CHRONIC PAIN SYNDROME: Primary | ICD-10-CM

## 2024-05-09 PROCEDURE — 1160F RVW MEDS BY RX/DR IN RCRD: CPT | Performed by: PHYSICIAN ASSISTANT

## 2024-05-09 PROCEDURE — 99214 OFFICE O/P EST MOD 30 MIN: CPT | Performed by: PHYSICIAN ASSISTANT

## 2024-05-09 PROCEDURE — 80305 DRUG TEST PRSMV DIR OPT OBS: CPT | Performed by: PHYSICIAN ASSISTANT

## 2024-05-09 PROCEDURE — 1125F AMNT PAIN NOTED PAIN PRSNT: CPT | Performed by: PHYSICIAN ASSISTANT

## 2024-05-09 PROCEDURE — 1159F MED LIST DOCD IN RCRD: CPT | Performed by: PHYSICIAN ASSISTANT

## 2024-05-09 RX ORDER — TRAMADOL HYDROCHLORIDE 50 MG/1
50-100 TABLET ORAL 3 TIMES DAILY PRN
Qty: 180 TABLET | Refills: 0 | Status: SHIPPED | OUTPATIENT
Start: 2024-05-11

## 2024-05-09 RX ORDER — IBUPROFEN 800 MG/1
800 TABLET ORAL 2 TIMES DAILY
Qty: 180 TABLET | Refills: 0 | Status: SHIPPED | OUTPATIENT
Start: 2024-05-09

## 2024-05-09 RX ORDER — CENEGERMIN-BKBJ 20 UG/ML
SOLUTION/ DROPS OPHTHALMIC
COMMUNITY
Start: 2024-03-18

## 2024-05-10 ENCOUNTER — TELEPHONE (OUTPATIENT)
Dept: PAIN MEDICINE | Facility: CLINIC | Age: 69
End: 2024-05-10

## 2024-05-10 NOTE — TELEPHONE ENCOUNTER
Will they patient be out of medication? CAREN report says medication was last filled on 4/17/24 so they should not need medication filled just yet.

## 2024-05-10 NOTE — TELEPHONE ENCOUNTER
Caller: JOE    Relationship: [facility]     Best call back number: [phone]    What is the best time to reach you: UNTIL 7 PM     Who are you requesting to speak with (clinical staff, provider,  specific staff member): CLINICAL STAFF     Do you know the name of the person who called: INCOMING     What was the call regarding: JOE STATES SHE HAS A GABAPENTIN PRESCRIPTION FOR THIS PATIENT FROM [facility] THAT IS NOTED TO FILL ON 5-11-24 BUT THEY ARE CLOSED ON THE WEEKEND. SHE WOULD LIKE TO KNOW IF IT IS OKAY TO GO AHEAD AND FILL THIS TODAY.  SHE SAYS A SECURE MESSAGE WAS ALSO SENT TO THE PROVIDER.

## 2024-05-20 ENCOUNTER — OFFICE VISIT (OUTPATIENT)
Dept: NEUROLOGY | Facility: CLINIC | Age: 69
End: 2024-05-20
Payer: MEDICARE

## 2024-05-20 VITALS
BODY MASS INDEX: 21.3 KG/M2 | OXYGEN SATURATION: 98 % | WEIGHT: 166 LBS | HEIGHT: 74 IN | HEART RATE: 88 BPM | DIASTOLIC BLOOD PRESSURE: 90 MMHG | SYSTOLIC BLOOD PRESSURE: 138 MMHG

## 2024-05-20 DIAGNOSIS — B02.29 POSTHERPETIC NEURALGIA: Primary | ICD-10-CM

## 2024-05-20 PROCEDURE — 99204 OFFICE O/P NEW MOD 45 MIN: CPT | Performed by: PSYCHIATRY & NEUROLOGY

## 2024-05-20 PROCEDURE — 1159F MED LIST DOCD IN RCRD: CPT | Performed by: PSYCHIATRY & NEUROLOGY

## 2024-05-20 PROCEDURE — 1160F RVW MEDS BY RX/DR IN RCRD: CPT | Performed by: PSYCHIATRY & NEUROLOGY

## 2024-05-20 RX ORDER — DULOXETIN HYDROCHLORIDE 60 MG/1
60 CAPSULE, DELAYED RELEASE ORAL DAILY
Qty: 30 CAPSULE | Refills: 2 | Status: SHIPPED | OUTPATIENT
Start: 2024-05-20 | End: 2025-05-20

## 2024-05-20 RX ORDER — DULOXETIN HYDROCHLORIDE 30 MG/1
30 CAPSULE, DELAYED RELEASE ORAL DAILY
Qty: 14 CAPSULE | Refills: 0 | Status: SHIPPED | OUTPATIENT
Start: 2024-05-20 | End: 2024-06-03

## 2024-05-20 NOTE — PROGRESS NOTES
Chief Complaint   Patient presents with    Neuralgia     Post herpetic       Patient ID: Beth Mckinney is a 68 y.o. male.    HPI: I had the pleasure of seeing your patient today.  As you may know he is a 68-year-old gentleman here for the assessment and treatment of postherpetic neuralgia.  The patient apparently experienced shingles on the left face.  This was the eye region and forehead region.  He states that this occurred in September of last year.  Once the vesicles had resolved he continued to have severe pain in the left face/forehead and cheek region.  He describes it as a sharp stabbing and stinging like sensation.  He says that it is essentially constant however tends to ebb and flow and severity.  It is tender to touch.  He has trouble lying on that side.  Putting pressure on that side is also difficult.  He has significant sensitivity to light due to his symptoms.  He tends to wear sunglasses most of the time, particularly when driving.  He says that he has had to limit his driving due to to his photosensitivity and pain.  No trouble swallowing.  No trouble hearing.  No double vision.  He was started on gabapentin.  However he was unable to tolerate that.  He then was switched to Horizant.  He has tolerated this much better however he says that it has only made small changes with respect to his pain.  He is in pain management and takes tramadol.  He has also had a trigeminal nerve block which was not helpful.    The following portions of the patient's history were reviewed and updated as appropriate: allergies, current medications, past family history, past medical history, past social history, past surgical history and problem list.    Review of Systems   Constitutional:  Positive for fatigue.   Eyes:  Positive for photophobia, pain and visual disturbance (seeing double at times).   Neurological:  Positive for light-headedness and headaches. Negative for dizziness, tremors, seizures, syncope,  facial asymmetry, speech difficulty, weakness and numbness.   Psychiatric/Behavioral:  Positive for sleep disturbance. Negative for agitation, behavioral problems, confusion, decreased concentration, dysphoric mood, hallucinations, self-injury and suicidal ideas. The patient is not nervous/anxious and is not hyperactive.       I have reviewed the review of systems above performed by my medical assistant.      Vitals:    24 0948   BP: 138/90   Pulse: 88   SpO2: 98%       Neurologic Exam     Mental Status   Oriented to person, place, and time.   Registration: recalls 3 of 3 objects. Follows 3 step commands.   Attention: normal. Concentration: normal.   Speech: speech is normal   Level of consciousness: alert  Knowledge: consistent with education (No deficits found.).   Normal comprehension.     Cranial Nerves     CN II   Visual fields full to confrontation.     CN III, IV, VI   Pupils are equal, round, and reactive to light.  Extraocular motions are normal.   CN III: no CN III palsy  CN VI: no CN VI palsy  Nystagmus: none   Diplopia: none    CN V   Facial sensation intact.     CN VII   Facial expression full, symmetric.     CN VIII   CN VIII normal.     CN IX, X   CN IX normal.   CN X normal.     CN XI   CN XI normal.     CN XII   CN XII normal.     Motor Exam   Muscle bulk: normal  Right arm tone: normal  Left arm tone: normal  Right leg tone: normal  Left leg tone: normal    Strength   Right neck flexion: 5/5  Left neck flexion: 5/5  Right neck extension: 5/5  Left neck extension: 5/5  Right deltoid: 5/5  Left deltoid: 5/5  Right biceps: 5/5  Left biceps: 5/5  Right triceps: 5/5  Left triceps: 5/5  Right wrist flexion: 5/5  Left wrist flexion: 5/5  Right wrist extension: 5/5  Left wrist extension: 5/5  Right interossei: 5/5  Left interossei: 5/5  Right abdominals: 5/5  Left abdominals: 5/5  Right iliopsoas: 5/5  Left iliopsoas: 5/5  Right quadriceps: 5/5  Left quadriceps: 5/5  Right hamstrin/5  Left  hamstrin/5  Right glutei: 5/5  Left glutei: 5/5  Right anterior tibial: 5/5  Left anterior tibial: 5/5  Right posterior tibial: 5/5  Left posterior tibial: 5/5  Right peroneal: 5/5  Left peroneal: 5/5  Right gastroc: 5/5  Left gastroc: 5/5    Sensory Exam   Light touch normal.   Vibration normal.   Proprioception normal.   Pinprick normal.     Gait, Coordination, and Reflexes     Gait  Gait: normal    Coordination   Romberg: negative    Tremor   Resting tremor: absent  Intention tremor: absent    Reflexes   Right brachioradialis: 2+  Left brachioradialis: 2+  Right biceps: 2+  Left biceps: 2+  Right triceps: 2+  Left triceps: 2+  Right patellar: 2+  Left patellar: 2+  Right achilles: 2+  Left achilles: 2+  Right : 2+  Left : 2+Station is normal.       Physical Exam  Vitals reviewed.   Constitutional:       General: He is not in acute distress.     Appearance: He is well-developed.   HENT:      Head: Normocephalic and atraumatic.   Eyes:      Extraocular Movements: EOM normal.      Pupils: Pupils are equal, round, and reactive to light.   Cardiovascular:      Rate and Rhythm: Normal rate and regular rhythm.      Heart sounds: Normal heart sounds.   Pulmonary:      Effort: Pulmonary effort is normal. No respiratory distress.      Breath sounds: Normal breath sounds.   Abdominal:      General: Bowel sounds are normal. There is no distension.      Palpations: Abdomen is soft.      Tenderness: There is no abdominal tenderness.   Musculoskeletal:         General: No deformity.      Cervical back: Normal range of motion.   Skin:     General: Skin is warm.      Findings: No rash.   Neurological:      Mental Status: He is oriented to person, place, and time.      Coordination: Romberg Test normal.      Gait: Gait is intact.      Deep Tendon Reflexes:      Reflex Scores:       Tricep reflexes are 2+ on the right side and 2+ on the left side.       Bicep reflexes are 2+ on the right side and 2+ on the left side.        Brachioradialis reflexes are 2+ on the right side and 2+ on the left side.       Patellar reflexes are 2+ on the right side and 2+ on the left side.       Achilles reflexes are 2+ on the right side and 2+ on the left side.  Psychiatric:         Speech: Speech normal.         Judgment: Judgment normal.         Procedures    Assessment/Plan: He will continue with the Horizant.  He is at max dose of 600 twice daily.  I would advise that we try a different medication, adding to the Horizant.  We will try Cymbalta 30 mg for 2 weeks, then increase to 60 mg daily.  May go to 90 mg daily if needed.  May also consider carbamazepine if needed.  He will continue with pain management.  He is also scheduled to have a procedure with ophthalmology.  Will see him in about 4 months or sooner if needed.       Diagnoses and all orders for this visit:    1. Postherpetic neuralgia (Primary)  -     DULoxetine (CYMBALTA) 30 MG capsule; Take 1 capsule by mouth Daily for 14 days.  Dispense: 14 capsule; Refill: 0  -     DULoxetine (Cymbalta) 60 MG capsule; Take 1 capsule by mouth Daily.  Dispense: 30 capsule; Refill: 2           Franklin Davidson II, MD

## 2024-05-22 ENCOUNTER — PATIENT ROUNDING (BHMG ONLY) (OUTPATIENT)
Dept: NEUROLOGY | Facility: CLINIC | Age: 69
End: 2024-05-22
Payer: MEDICARE

## 2024-07-08 ENCOUNTER — OFFICE VISIT (OUTPATIENT)
Dept: FAMILY MEDICINE CLINIC | Facility: CLINIC | Age: 69
End: 2024-07-08
Payer: MEDICARE

## 2024-07-08 VITALS
OXYGEN SATURATION: 99 % | DIASTOLIC BLOOD PRESSURE: 80 MMHG | HEART RATE: 90 BPM | HEIGHT: 74 IN | WEIGHT: 162 LBS | TEMPERATURE: 98.4 F | SYSTOLIC BLOOD PRESSURE: 138 MMHG | RESPIRATION RATE: 18 BRPM | BODY MASS INDEX: 20.79 KG/M2

## 2024-07-08 DIAGNOSIS — B02.29 NEURALGIA, POST-HERPETIC: ICD-10-CM

## 2024-07-08 DIAGNOSIS — Z21 ASYMPTOMATIC HIV INFECTION, WITH NO HISTORY OF HIV-RELATED ILLNESS: ICD-10-CM

## 2024-07-08 PROCEDURE — 99214 OFFICE O/P EST MOD 30 MIN: CPT | Performed by: FAMILY MEDICINE

## 2024-07-08 PROCEDURE — 1125F AMNT PAIN NOTED PAIN PRSNT: CPT | Performed by: FAMILY MEDICINE

## 2024-07-08 RX ORDER — IBUPROFEN 800 MG/1
800 TABLET ORAL 2 TIMES DAILY
Qty: 180 TABLET | Refills: 2 | Status: SHIPPED | OUTPATIENT
Start: 2024-07-08

## 2024-07-08 RX ORDER — BICTEGRAVIR SODIUM, EMTRICITABINE, AND TENOFOVIR ALAFENAMIDE FUMARATE 50; 200; 25 MG/1; MG/1; MG/1
1 TABLET ORAL DAILY
Qty: 90 TABLET | Refills: 2 | Status: SHIPPED | OUTPATIENT
Start: 2024-07-08

## 2024-07-08 RX ORDER — OXYCODONE AND ACETAMINOPHEN 7.5; 325 MG/1; MG/1
1 TABLET ORAL EVERY 8 HOURS PRN
Qty: 90 TABLET | Refills: 0 | Status: SHIPPED | OUTPATIENT
Start: 2024-07-08

## 2024-07-08 NOTE — PROGRESS NOTES
"Chief Complaint  Chief Complaint   Patient presents with    Neuralgia     3 mon f/u        Subjective    History of Present Illness        Beth Mckinney presents to [facility] for   History of Present Illness  The patient presents for evaluation of multiple medical concerns.    The patient experienced an adverse reaction to a second nerve block administered by Dr. Christine, a pain management specialist. A week post-injection, Dr. Christine administered the second block. The patient is scheduled for an eye procedure in the [facility] next month, hoping this will alleviate his ocular issues. He has exhausted his supply of OxyContin 70, which he has been using for a duration of 3 months. He has ceased taking tramadol and has not received an injection for the past 6 weeks. He reports that Tramadol was more effective than OxyContin, and OxyContin provides him with comfort. He has been experiencing these symptoms for 10 months. Previously, he was on Damion Horizon, which resulted in hallucinations, necessitating cessation of driving, and subsequent falls. He has experienced weight loss, from 177 pounds to 161 pounds. He continues to take vitamin supplements. He inquired about the possibility of receiving the shingles vaccine.    The patient requires a refill of his Biktarvy prescription.     History of Present Illness      Objective   Vital Signs:   Visit Vitals  /80   Pulse 90   Temp 98.4 °F (36.9 °C)   Resp 18   Ht 188 cm (74.02\")   Wt 73.5 kg (162 lb)   SpO2 99%   BMI 20.79 kg/m²          BMI is within normal parameters. No other follow-up for BMI required.     Physical Exam  Vitals reviewed.   Constitutional:       Appearance: He is well-developed.   HENT:      Head: Normocephalic.      Right Ear: External ear normal.      Left Ear: External ear normal.      Nose: Nose normal.   Eyes:      Conjunctiva/sclera: Conjunctivae normal.   Cardiovascular:      Rate and Rhythm: Normal rate " and regular rhythm.   Pulmonary:      Effort: Pulmonary effort is normal.      Breath sounds: Normal breath sounds.   Musculoskeletal:         General: Normal range of motion.      Cervical back: Normal range of motion and neck supple.   Skin:     General: Skin is warm and dry.      Capillary Refill: Capillary refill takes less than 2 seconds.   Neurological:      Mental Status: He is alert and oriented to person, place, and time.        Physical Exam           Result Review :  Results                            Assessment and Plan      Diagnoses and all orders for this visit:    1. Neuralgia, post-herpetic  Assessment & Plan:  Unchanged.  Due to his continued pain he was given a prescription of Oxycodone.  His dosage was decreased from 7.5 to 5mg.  He was encouraged to RTC in 1 month for followup on pain.    Orders:  -     oxyCODONE-acetaminophen (PERCOCET) 7.5-325 MG per tablet; Take 1 tablet by mouth Every 8 (Eight) Hours As Needed for Severe Pain.  Dispense: 90 tablet; Refill: 0  -     ibuprofen (ADVIL,MOTRIN) 800 MG tablet; Take 1 tablet by mouth 2 (Two) Times a Day.  Dispense: 180 tablet; Refill: 2    2. Asymptomatic HIV infection, with no history of HIV-related illness  Assessment & Plan:  Stale  He was given a refill on his medication.  He has no complaints at this time.  He is being managed by Infectious disease.  He was encouraged to RTC as needed.    Orders:  -     Bictegravir-Emtricitab-Tenofov (Biktarvy) -25 MG per tablet; Take 1 tablet by mouth Daily.  Dispense: 90 tablet; Refill: 2       Assessment & Plan             Follow Up   No follow-ups on file.  Patient was given instructions and counseling regarding his condition or for health maintenance advice. Please see specific information pulled into the AVS if appropriate.     Patient or patient representative verbalized consent for the use of Ambient Listening during the visit with  Nirav Donovan Sr, MD for chart documentation. 7/20/2024  02:14  EDT  Answers submitted by the patient for this visit:  Primary Reason for Visit (Submitted on 7/3/2024)  What is the primary reason for your visit?: Other  Other (Submitted on 7/3/2024)  Please describe your symptoms.: Pain resulting from Shingles 09/04/2023  Have you had these symptoms before?: Yes  How long have you been having these symptoms?: Greater than 2 weeks

## 2024-07-20 NOTE — ASSESSMENT & PLAN NOTE
Staaure  He was given a refill on his medication.  He has no complaints at this time.  He is being managed by Infectious disease.  He was encouraged to RTC as needed.

## 2024-07-20 NOTE — ASSESSMENT & PLAN NOTE
Unchanged.  Due to his continued pain he was given a prescription of Oxycodone.  His dosage was decreased from 7.5 to 5mg.  He was encouraged to RTC in 1 month for followup on pain.

## 2024-07-22 ENCOUNTER — TELEPHONE (OUTPATIENT)
Dept: FAMILY MEDICINE CLINIC | Facility: CLINIC | Age: 69
End: 2024-07-22
Payer: MEDICARE

## 2024-07-22 NOTE — TELEPHONE ENCOUNTER
Attempted to call pt. No answer LM     Relay     Pts appt on 7/23 @4pm needs to be moved up as there was miscommunication for his scheduled tomorrow he does not see pts later than 3:45. Pt can move up appt OR resched to different day if that does not work

## 2024-08-15 ENCOUNTER — OFFICE VISIT (OUTPATIENT)
Dept: FAMILY MEDICINE CLINIC | Facility: CLINIC | Age: 69
End: 2024-08-15
Payer: MEDICARE

## 2024-08-15 VITALS
DIASTOLIC BLOOD PRESSURE: 80 MMHG | WEIGHT: 161 LBS | SYSTOLIC BLOOD PRESSURE: 132 MMHG | RESPIRATION RATE: 18 BRPM | HEIGHT: 74 IN | OXYGEN SATURATION: 98 % | HEART RATE: 87 BPM | BODY MASS INDEX: 20.66 KG/M2

## 2024-08-15 DIAGNOSIS — Z12.5 SCREENING FOR PROSTATE CANCER: ICD-10-CM

## 2024-08-15 DIAGNOSIS — B02.29 POST HERPETIC NEURALGIA: ICD-10-CM

## 2024-08-15 DIAGNOSIS — Z12.11 SCREENING FOR COLON CANCER: ICD-10-CM

## 2024-08-15 DIAGNOSIS — K64.9 HEMORRHOIDS, UNSPECIFIED HEMORRHOID TYPE: ICD-10-CM

## 2024-08-15 DIAGNOSIS — Z00.00 MEDICARE ANNUAL WELLNESS VISIT, SUBSEQUENT: Primary | ICD-10-CM

## 2024-08-15 DIAGNOSIS — B02.29 NEURALGIA, POST-HERPETIC: ICD-10-CM

## 2024-08-15 DIAGNOSIS — E11.9 TYPE 2 DIABETES MELLITUS WITHOUT COMPLICATION, WITHOUT LONG-TERM CURRENT USE OF INSULIN: ICD-10-CM

## 2024-08-15 RX ORDER — VALACYCLOVIR HYDROCHLORIDE 1 G/1
1000 TABLET, FILM COATED ORAL EVERY 8 HOURS
Qty: 21 TABLET | Refills: 0 | Status: SHIPPED | OUTPATIENT
Start: 2024-08-15

## 2024-08-15 RX ORDER — OXYCODONE AND ACETAMINOPHEN 7.5; 325 MG/1; MG/1
1 TABLET ORAL EVERY 8 HOURS PRN
Qty: 90 TABLET | Refills: 0 | Status: SHIPPED | OUTPATIENT
Start: 2024-08-15

## 2024-08-15 NOTE — ASSESSMENT & PLAN NOTE
Medicare wellness completed.  Discussed advanced directives with patient.  Performed the Mini-Mental exam and patient scored 28/30.

## 2024-08-15 NOTE — PROGRESS NOTES
Subjective   The ABCs of the Annual Wellness Visit  Medicare Wellness Visit      Beth Mckinney is a 69 y.o. patient who presents for a Medicare Wellness Visit.    The following portions of the patient's history were reviewed and   updated as appropriate: allergies, current medications, past family history, past medical history, past social history, past surgical history, and problem list.    Compared to one year ago, the patient's physical   health is worse.  Compared to one year ago, the patient's mental   health is worse.    Recent Hospitalizations:  This patient has had a [facility] admission record on file within the last 365 days.  Current Medical Providers:  Patient Care Team:  Nirav Donovan Sr., MD as PCP - General (Family Medicine)  Gold Liu MD (Internal Medicine)    Outpatient Medications Prior to Visit   Medication Sig Dispense Refill    aspirin 81 MG EC tablet Take 1 tablet by mouth Daily.      Bictegravir-Emtricitab-Tenofov (Biktarvy) -25 MG per tablet Take 1 tablet by mouth Daily. 90 tablet 2    DULoxetine (Cymbalta) 60 MG capsule Take 1 capsule by mouth Daily. 30 capsule 2    ferrous sulfate 325 (65 FE) MG tablet Take 1 tablet by mouth 2 (Two) Times a Day. 60 tablet 4    fluorometholone (FML Liquifilm) 0.1 % ophthalmic suspension Instill 1 drop into Left Eye 4 times a day 10 mL 3    ibuprofen (ADVIL,MOTRIN) 800 MG tablet Take 1 tablet by mouth 2 (Two) Times a Day. 180 tablet 2    loteprednol (LOTEMAX) 0.5 % ophthalmic suspension Instill 1 drop into left eye four times daily      metFORMIN (GLUCOPHAGE) 1000 MG tablet Take 1 tablet by mouth Daily With Breakfast.      Oxervate 0.002 % solution       Senexon-S 8.6-50 MG per tablet Take 2 tablets by mouth Daily. 60 tablet 0    oxyCODONE-acetaminophen (PERCOCET) 7.5-325 MG per tablet Take 1 tablet by mouth Every 8 (Eight) Hours As Needed for Severe Pain. 90 tablet 0     No facility-administered medications prior to  visit.     Opioid medication/s are on active medication list.  and I have evaluated his active treatment plan and pain score trends (see table).  Vitals:    08/15/24 1032   PainSc: 0-No pain     I have reviewed the chart for potential of high risk medication and harmful drug interactions in the elderly.        Aspirin is on active medication list. Aspirin use is indicated based on review of current medical condition/s. Pros and cons of this therapy have been discussed today. Benefits of this medication outweigh potential harm.  Patient has been encouraged to continue taking this medication.  .      Patient Active Problem List   Diagnosis    Acute viral conjunctivitis of left eye    Anxiety    Bilateral knee pain    Left shoulder pain    Bursitis of elbow    Olecranon bursitis    Claustrophobia    Colitis    Fatigue    HIV infection    Hx MRSA infection    Diabetes mellitus    Impingement syndrome of left shoulder    Insect bite of scalp    Kaposi sarcoma    Left rotator cuff tear    Legally blind    Low back pain    MRSA (methicillin resistant Staphylococcus aureus) infection    Myocardial infarction    Neoplasm of soft tissue of lower extremity    Orbital cellulitis on left    Osteoarthritis of left knee    Post herpetic neuralgia    Rheumatoid arthritis    Rotator cuff tear arthropathy of both shoulders    Stroke    Ulcer of lower limb    Osteoarthritis of right knee    Iron deficiency anemia    Shortness of breath    Symptomatic anemia    Hair loss    Nausea and vomiting in adult    Encounter for long-term (current) use of high-risk medication    Chronic pain syndrome    Neuralgia, post-herpetic    Medicare annual wellness visit, subsequent     Advance Care Planning Advance Directive is not on file.  ACP discussion was held with the patient during this visit. Patient does not have an advance directive, information provided.            Objective   Vitals:    08/15/24 1032   BP: 132/80   Pulse: 87   Resp: 18  "  SpO2: 98%   Weight: 73 kg (161 lb)   Height: 188 cm (74.02\")   PainSc: 0-No pain       Estimated body mass index is 20.66 kg/m² as calculated from the following:    Height as of this encounter: 188 cm (74.02\").    Weight as of this encounter: 73 kg (161 lb).    BMI is within normal parameters. No other follow-up for BMI required.    Mini-Mental State Examination (MMSE)        Instructions: Ask the questions in the order listed. Score one point for each correct response within each question or activity.      Maximum Score  Patient’s Score  Questions    5   5 “What is the year?  Season?  Date?  Day of the week?  Month?”    5  5  “Where are we now: State?  County?  Town/city?  Hospital?  Floor?”    3  3  The examiner names three unrelated objects clearly and slowly, then asks the patient to name all three of them. The patient’s response is used for scoring. The examiner repeats them until patient learns all of them, if possible. Number of trials: ___________    5   5 “I would like you to count backward from 100 by sevens.” (93, 86, 79, 72, 65, …) Stop after five answers.   Alternative: “Spell WORLD backwards.” ([id number])    3  3  “Earlier I told you the names of three things. Can you tell me what those were?”    2  2  Show the patient two simple objects, such as a wristwatch and a pencil, and ask the patient to name them.    1   1 “Repeat the phrase: ‘No ifs, ands, or buts.’”    3  2  “Take the paper in your right hand, fold it in half, and put it on the floor.”   (The examiner gives the patient a piece of blank paper.)    1  1  “Please read this and do what it says.” (Written instruction is “Close your eyes.”)    1   1 “Make up and write a sentence about anything.” (This sentence must contain a noun and a verb.)    1  0  “Please copy this picture.” (The examiner gives the patient a blank piece of paper and asks him/her to draw the symbol below. All 10 angles must be present and two must intersect.)             30  " 28  TOTAL         Does the patient have evidence of cognitive impairment? No                                                                                                Health  Risk Assessment    Smoking Status:  Social History     Tobacco Use   Smoking Status Never    Passive exposure: Never   Smokeless Tobacco Not on file     Alcohol Consumption:  Social History     Substance and Sexual Activity   Alcohol Use Never    Comment: SELDOM       Fall Risk Screen  [facility]NA Fall Risk Assessment was completed, and patient is at MODERATE risk for falls. Assessment completed on:8/15/2024    Depression Screenin/15/2024    10:33 AM   PHQ-2/PHQ-9 Depression Screening   Little Interest or Pleasure in Doing Things 2-->more than half the days   Feeling Down, Depressed or Hopeless 1-->several days   Trouble Falling or Staying Asleep, or Sleeping Too Much 0-->not at all   Feeling Tired or Having Little Energy 2-->more than half the days   Poor Appetite or Overeating 1-->several days   Feeling Bad about Yourself - or that You are a Failure or Have Let Yourself or Your Family Down 0-->not at all   Trouble Concentrating on Things, Such as Reading the Newspaper or Watching Television 0-->not at all   Moving or Speaking So Slowly that Other People Could Have Noticed? Or the Opposite - Being So Fidgety 0-->not at all   Thoughts that You Would be Better Off Dead or of Hurting Yourself in Some Way 0-->not at all   PHQ-9: Brief Depression Severity Measure Score 6   If You Checked Off Any Problems, How Difficult Have These Problems Made It For You to Do Your Work, Take Care of Things at Home, or Get Along with Other People? not difficult at all     Health Habits and Functional and Cognitive Screenin/14/2024     4:46 PM   Functional & Cognitive Status   Do you have difficulty preparing food and eating? No   Do you have difficulty bathing yourself, getting dressed or grooming yourself? No   Do you have difficulty using the  toilet? No   Do you have difficulty moving around from place to place? No   Do you have trouble with steps or getting out of a bed or a chair? No   Current Diet Well Balanced Diet   Dental Exam Up to date   Eye Exam Up to date   Exercise (times per week) 2 times per week   Current Exercises Include Walking;Yard Work   Do you need help using the phone?  No   Are you deaf or do you have serious difficulty hearing?  No   Do you need help to go to places out of walking distance? No   Do you need help shopping? No   Do you need help preparing meals?  No   Do you need help with housework?  No   Do you need help with laundry? No   Do you need help taking your medications? No   Do you need help managing money? No   Do you ever drive or ride in a car without wearing a seat belt? No   Have you felt unusual stress, anger or loneliness in the last month? No   Who do you live with? Alone   If you need help, do you have trouble finding someone available to you? No   Have you been bothered in the last four weeks by sexual problems? No   Do you have difficulty concentrating, remembering or making decisions? No           Age-appropriate Screening Schedule:  Refer to the list below for future screening recommendations based on patient's age, sex and/or medical conditions. Orders for these recommended tests are listed in the plan section. The patient has been provided with a written plan.    Health Maintenance List  Health Maintenance   Topic Date Due    COLORECTAL CANCER SCREENING  Never done    Pneumococcal Vaccine 65+ (1 of 2 - PCV) Never done    TDAP/TD VACCINES (1 - Tdap) Never done    ZOSTER VACCINE (1 of 2) Never done    Hepatitis B (1 of 3 - Risk 3-dose series) Never done    HEPATITIS C SCREENING  Never done    DIABETIC FOOT EXAM  Never done    COVID-19 Vaccine (3 - Pfizer risk series) 05/25/2021    HEMOGLOBIN A1C  04/30/2024    INFLUENZA VACCINE  08/01/2024    URINE MICROALBUMIN  10/30/2024    DIABETIC EYE EXAM  10/31/2024     "ANNUAL WELLNESS VISIT  08/15/2025                                                                                                                                                CMS Preventative Services Quick Reference  Risk Factors Identified During Encounter  None Identified    The above risks/problems have been discussed with the patient.  Pertinent information has been shared with the patient in the After Visit Summary.  An After Visit Summary and PPPS were made available to the patient.    Follow Up:   Next Medicare Wellness visit to be scheduled in 1 year.         Additional E&M Note during same encounter follows:  Patient has additional, significant, and separately identifiable condition(s)/problem(s) that require work above and beyond the Medicare Wellness Visit     Chief Complaint  Medicare Wellness-subsequent (AWV)    Subjective   HPI      Review of Systems   All other systems reviewed and are negative.             Objective   Vital Signs:  /80   Pulse 87   Resp 18   Ht 188 cm (74.02\")   Wt 73 kg (161 lb)   SpO2 98%   BMI 20.66 kg/m²   Physical Exam  Vitals reviewed.   Constitutional:       Appearance: Normal appearance. He is well-developed and normal weight.   HENT:      Head: Normocephalic and atraumatic.      Right Ear: Tympanic membrane, ear canal and external ear normal.      Left Ear: Tympanic membrane, ear canal and external ear normal.      Nose: Nose normal.      Mouth/Throat:      Mouth: Mucous membranes are moist.      Pharynx: Oropharynx is clear.   Eyes:      General: Lids are normal.      Conjunctiva/sclera: Conjunctivae normal.      Pupils: Pupils are equal, round, and reactive to light.   Cardiovascular:      Rate and Rhythm: Normal rate and regular rhythm.      Pulses: Normal pulses.      Heart sounds: Normal heart sounds.   Pulmonary:      Effort: Pulmonary effort is normal. No respiratory distress.      Breath sounds: Normal breath sounds.   Abdominal:      General: Abdomen " is flat. Bowel sounds are normal.      Palpations: Abdomen is soft.   Musculoskeletal:         General: Normal range of motion.      Cervical back: Normal range of motion and neck supple.   Skin:     General: Skin is warm and dry.      Capillary Refill: Capillary refill takes less than 2 seconds.   Neurological:      General: No focal deficit present.      Mental Status: He is alert and oriented to person, place, and time. Mental status is at baseline.   Psychiatric:         Mood and Affect: Mood normal.         Behavior: Behavior normal.         Thought Content: Thought content normal.         Judgment: Judgment normal.         The following data was reviewed by: Nirav Donovan Sr, MD on 08/15/2024:        Assessment and Plan Additional age appropriate preventative wellness advice topics were discussed during today's preventative wellness exam(some topics already addressed during AWV portion of the note above):    Physical Activity: Advised cardiovascular activity 150 minutes per week as tolerated. (example brisk walk for 30 minutes, 5 days a week).   Nutrition: Discussed nutrition plan with patient. Information shared in after visit summary. Goal is for a well balanced diet to enhance overall health.   Healthy Weight: Discussed current and goal BMI with patient. Steps to attain this goal discussed. Information shared in after visit summary.              Medicare annual wellness visit, subsequent  Medicare wellness completed.  Discussed advanced directives with patient.  Performed the Mini-Mental exam and patient scored 28/30.  Type 2 diabetes mellitus without complication, without long-term current use of insulin    Screening for prostate cancer    Post herpetic neuralgia    Neuralgia, post-herpetic    Screening for colon cancer    Hemorrhoids, unspecified hemorrhoid type      Orders Placed This Encounter   Procedures    Comprehensive Metabolic Panel     Order Specific Question:   Release to patient     Answer:    Routine Release [[id number]]    Lipid Panel With / Chol / HDL Ratio     Order Specific Question:   Release to patient     Answer:   Routine Release [[id number]]    PSA Screen     Order Specific Question:   Release to patient     Answer:   Routine Release [[id number]]    TSH     Order Specific Question:   Release to patient     Answer:   Routine Release [[id number]]    Hemoglobin A1c     Order Specific Question:   Release to patient     Answer:   Routine Release [[id number]]    Ambulatory Referral to Pain Management     Referral Priority:   Routine     Referral Type:   Pain Management     Referral Reason:   Specialty Services Required     Referral Location:   [facility]     Requested Specialty:   Pain Medicine     Number of Visits Requested:   1    Ambulatory Referral For Screening Colonoscopy     Referral Priority:   Routine     Referral Type:   Diagnostic Medical     Referral Reason:   Specialty Services Required     Referred to Provider:   Dagmar Hall MD     Number of Visits Requested:   1    Ambulatory Referral to Colorectal Surgery     Referral Priority:   Routine     Referral Type:   Consultation     Referral Reason:   Specialty Services Required     Requested Specialty:   Colon and Rectal Surgery     Number of Visits Requested:   1    CBC & Differential     Order Specific Question:   Manual Differential     Answer:   No     Order Specific Question:   Release to patient     Answer:   Routine Release [[id number]]     New Medications Ordered This Visit   Medications    valACYclovir (VALTREX) 1000 MG tablet     Sig: Take 1 tablet by mouth Every 8 (Eight) Hours.     Dispense:  21 tablet     Refill:  0    oxyCODONE-acetaminophen (PERCOCET) 7.5-325 MG per tablet     Sig: Take 1 tablet by mouth Every 8 (Eight) Hours As Needed for Severe Pain.     Dispense:  90 tablet     Refill:  0          Follow Up   No follow-ups on file.  Patient was given instructions and counseling regarding his  condition or for health maintenance advice. Please see specific information pulled into the AVS if appropriate.

## 2024-08-28 ENCOUNTER — OFFICE VISIT (OUTPATIENT)
Dept: NEUROLOGY | Facility: CLINIC | Age: 69
End: 2024-08-28
Payer: MEDICARE

## 2024-08-28 VITALS
WEIGHT: 165 LBS | HEART RATE: 76 BPM | HEIGHT: 74 IN | DIASTOLIC BLOOD PRESSURE: 84 MMHG | BODY MASS INDEX: 21.17 KG/M2 | SYSTOLIC BLOOD PRESSURE: 126 MMHG | OXYGEN SATURATION: 97 %

## 2024-08-28 DIAGNOSIS — B02.29 POSTHERPETIC NEURALGIA: Primary | ICD-10-CM

## 2024-08-28 PROCEDURE — 1159F MED LIST DOCD IN RCRD: CPT | Performed by: PSYCHIATRY & NEUROLOGY

## 2024-08-28 PROCEDURE — 1160F RVW MEDS BY RX/DR IN RCRD: CPT | Performed by: PSYCHIATRY & NEUROLOGY

## 2024-08-28 PROCEDURE — 99214 OFFICE O/P EST MOD 30 MIN: CPT | Performed by: PSYCHIATRY & NEUROLOGY

## 2024-08-28 RX ORDER — DULOXETIN HYDROCHLORIDE 30 MG/1
30 CAPSULE, DELAYED RELEASE ORAL DAILY
Qty: 30 CAPSULE | Refills: 4 | Status: SHIPPED | OUTPATIENT
Start: 2024-08-28 | End: 2024-09-27

## 2024-08-28 RX ORDER — CARBAMAZEPINE 200 MG/1
200 CAPSULE, EXTENDED RELEASE ORAL 2 TIMES DAILY
Qty: 60 CAPSULE | Refills: 4 | Status: SHIPPED | OUTPATIENT
Start: 2024-08-28 | End: 2024-09-29

## 2024-08-28 NOTE — PROGRESS NOTES
Chief Complaint   Patient presents with    Postherpetic neuralgia       Patient ID: Beth Mckinney is a 69 y.o. male.    HPI: I had the pleasure of seeing your patient again today.  As you may know he is a 69-year-old gentleman here for the management of postherpetic neuralgia, left forehead.  He is still experiencing quite significant pain on a daily basis.  He is now taking Cymbalta 60 mg daily.  It has not helped thus far.  As I mentioned he has pain on a daily basis.  He was prescribed carbamazepine extended release 100 mg to be taken twice daily by a pain management specialist however he is concerned about starting the medication.  He denies any new symptoms neurologically.  No focal weakness of his arms or legs.  No double vision loss of vision.    The following portions of the patient's history were reviewed and updated as appropriate: allergies, current medications, past family history, past medical history, past social history, past surgical history and problem list.    Review of Systems   Neurological:  Positive for dizziness, light-headedness and headaches. Negative for tremors, seizures, syncope, facial asymmetry, speech difficulty, weakness and numbness.   Psychiatric/Behavioral:  Positive for agitation. Negative for behavioral problems, confusion, decreased concentration, dysphoric mood, hallucinations, self-injury, sleep disturbance and suicidal ideas. The patient is not nervous/anxious and is not hyperactive.       I have reviewed the review of systems above performed by my medical assistant.      Vitals:    08/28/24 1043   BP: 126/84   Pulse: 76   SpO2: 97%       Neurologic Exam     Mental Status   Oriented to person, place, and time.   Concentration: normal.   Level of consciousness: alert  Knowledge: consistent with education (No deficits found.).     Cranial Nerves     CN II   Visual fields full to confrontation.     CN III, IV, VI   Pupils are equal, round, and reactive to  light.  Extraocular motions are normal.   CN III: no CN III palsy  CN VI: no CN VI palsy    CN V   Facial sensation intact.     CN VII   Facial expression full, symmetric.     CN VIII   CN VIII normal.     CN IX, X   CN IX normal.   CN X normal.     CN XI   CN XI normal.     CN XII   CN XII normal.     Motor Exam     Strength   Right neck flexion: 5/5  Left neck flexion: 5/5  Right neck extension: 5/5  Left neck extension: 5/5  Right deltoid: 5/5  Left deltoid: 5/5  Right biceps: 5/5  Left biceps: 5/5  Right triceps: 5/5  Left triceps: 5/5  Right wrist flexion: 5/5  Left wrist flexion: 5/5  Right wrist extension: 5/5  Left wrist extension: 5/5  Right interossei: 5/5  Left interossei: 5/5  Right abdominals: 5/5  Left abdominals: 5/5  Right iliopsoas: 5/5  Left iliopsoas: 5/5  Right quadriceps: 5/5  Left quadriceps: 5/5  Right hamstrin/5  Left hamstrin/5  Right glutei: 5/5  Left glutei: 5/5  Right anterior tibial: 5/5  Left anterior tibial: 5/5  Right posterior tibial: 5/5  Left posterior tibial: 5/5  Right peroneal: 5/5  Left peroneal: 5/5  Right gastroc: 5/5  Left gastroc: 5/5    Sensory Exam   Light touch normal.   Vibration normal.     Gait, Coordination, and Reflexes     Gait  Gait: normal    Reflexes   Right brachioradialis: 2+  Left brachioradialis: 2+  Right biceps: 2+  Left biceps: 2+  Right triceps: 2+  Left triceps: 2+  Right patellar: 2+  Left patellar: 2+  Right achilles: 2+  Left achilles: 2+  Right : 2+  Left : 2+Station is normal.       Physical Exam  Vitals reviewed.   Constitutional:       Appearance: He is well-developed.   HENT:      Head: Normocephalic and atraumatic.   Eyes:      Extraocular Movements: EOM normal.      Pupils: Pupils are equal, round, and reactive to light.   Cardiovascular:      Rate and Rhythm: Normal rate and regular rhythm.   Pulmonary:      Breath sounds: Normal breath sounds.   Musculoskeletal:         General: Normal range of motion.   Skin:     General:  Skin is warm.   Neurological:      Mental Status: He is oriented to person, place, and time.      Gait: Gait is intact.      Deep Tendon Reflexes:      Reflex Scores:       Tricep reflexes are 2+ on the right side and 2+ on the left side.       Bicep reflexes are 2+ on the right side and 2+ on the left side.       Brachioradialis reflexes are 2+ on the right side and 2+ on the left side.       Patellar reflexes are 2+ on the right side and 2+ on the left side.       Achilles reflexes are 2+ on the right side and 2+ on the left side.        Procedures    Assessment/Plan: We are going to increase his Cymbalta to 90 mg daily.  I mentioned that that would be the maximum dose we would use of that medication.  He will do that for 2 weeks and if still not any better to go ahead and start the carbamazepine however we would start 200 mg twice daily.  Will see him back in about 3 to 4 months or sooner if needed.  A total of 30 minutes was spent face-to-face with the patient today.  Of that greater than 50% of this time was spent discussing signs and symptoms of postherpetic neuralgia, patient education, plan of care and prognosis.         Diagnoses and all orders for this visit:    1. Postherpetic neuralgia (Primary)  -     DULoxetine (CYMBALTA) 30 MG capsule; Take 1 capsule by mouth Daily for 30 days.  Dispense: 30 capsule; Refill: 4  -     carBAMazepine (CARBATROL) 200 MG 12 hr capsule; Take 1 capsule by mouth 2 (Two) Times a Day for 30 days.  Dispense: 60 capsule; Refill: 4           Franklin Davidson II, MD

## 2024-09-09 RX ORDER — PANTOPRAZOLE SODIUM 40 MG/1
1 TABLET, DELAYED RELEASE ORAL 2 TIMES DAILY
COMMUNITY

## 2024-09-09 RX ORDER — POLYMYXIN B SULFATE AND TRIMETHOPRIM 1; 10000 MG/ML; [USP'U]/ML
2 SOLUTION OPHTHALMIC EVERY 4 HOURS
COMMUNITY

## 2024-10-17 ENCOUNTER — OFFICE VISIT (OUTPATIENT)
Dept: FAMILY MEDICINE CLINIC | Facility: CLINIC | Age: 69
End: 2024-10-17
Payer: MEDICARE

## 2024-10-17 VITALS
DIASTOLIC BLOOD PRESSURE: 68 MMHG | OXYGEN SATURATION: 98 % | HEIGHT: 74 IN | BODY MASS INDEX: 21.17 KG/M2 | WEIGHT: 165 LBS | SYSTOLIC BLOOD PRESSURE: 146 MMHG | RESPIRATION RATE: 18 BRPM | HEART RATE: 81 BPM

## 2024-10-17 DIAGNOSIS — R23.8 SKIN IRRITATION: ICD-10-CM

## 2024-10-17 DIAGNOSIS — Z21 ASYMPTOMATIC HIV INFECTION, WITH NO HISTORY OF HIV-RELATED ILLNESS: ICD-10-CM

## 2024-10-17 DIAGNOSIS — B02.29 NEURALGIA, POST-HERPETIC: Primary | ICD-10-CM

## 2024-10-17 PROCEDURE — 99214 OFFICE O/P EST MOD 30 MIN: CPT | Performed by: FAMILY MEDICINE

## 2024-10-17 PROCEDURE — 1126F AMNT PAIN NOTED NONE PRSNT: CPT | Performed by: FAMILY MEDICINE

## 2024-10-17 RX ORDER — OXYCODONE AND ACETAMINOPHEN 10; 325 MG/1; MG/1
1 TABLET ORAL EVERY 8 HOURS PRN
Qty: 90 TABLET | Refills: 0 | Status: SHIPPED | OUTPATIENT
Start: 2024-10-17

## 2024-10-17 RX ORDER — VALACYCLOVIR HYDROCHLORIDE 1 G/1
1000 TABLET, FILM COATED ORAL DAILY
Qty: 30 TABLET | Refills: 12 | Status: SHIPPED | OUTPATIENT
Start: 2024-10-17

## 2024-10-17 RX ORDER — MUPIROCIN 20 MG/G
1 OINTMENT TOPICAL 3 TIMES DAILY
Qty: 30 G | Refills: 2 | Status: SHIPPED | OUTPATIENT
Start: 2024-10-17

## 2024-10-17 NOTE — PROGRESS NOTES
"Chief Complaint  Chief Complaint   Patient presents with    Neurologic Problem     Pt here for 2 mon f/u        Subjective    History of Present Illness        Beth Mckinney presents to [facility] for   History of Present Illness  The patient is a 69-year-old female who presents for evaluation of multiple medical concerns.    She has been experiencing severe pain for the past few days, which she attributes to her recent discontinuation of Cymbalta and carbamazepine as per her doctor's advice. She is unable to see her doctor for another month and is concerned about her ongoing pain. She has not had any prescriptions in the last two months and is requesting OxyContin for pain relief. She has been taking 7.5 mg of OxyContin, breaking one pill to make it 10 mg for self-relief. She is requesting a referral to another pain management specialist.    She has also noticed an open wound on her eyelid, which she suspects might be a side effect of Cymbalta. She has been applying Aquaphor and Neosporin to her wounds, but they have not been effective. She was given Silvadene cream at the wound center, but it did not help. She has an appointment with the eye doctor on Friday.    She has been falling at home due to balance issues, which she believes are caused by her medication. She is also dealing with stress related to her mother's health.    She suspects she might have shingles again, as she has noticed small spots and blotches on her head. She was prescribed Valtrex during her last visit two months ago, which helped with the pain. She is requesting a refill of this medication.  Neurologic Problem       History of Present Illness      Objective   Vital Signs:   Visit Vitals  /68   Pulse 81   Resp 18   Ht 188 cm (74.02\")   Wt 74.8 kg (165 lb)   SpO2 98%   BMI 21.17 kg/m²          BMI is within normal parameters. No other follow-up for BMI required.     Physical Exam  Vitals " reviewed.   Constitutional:       Appearance: He is well-developed.   HENT:      Head: Normocephalic.      Right Ear: External ear normal.      Left Ear: External ear normal.      Nose: Nose normal.   Eyes:      Conjunctiva/sclera: Conjunctivae normal.   Cardiovascular:      Rate and Rhythm: Normal rate and regular rhythm.   Pulmonary:      Effort: Pulmonary effort is normal.      Breath sounds: Normal breath sounds.   Musculoskeletal:         General: Normal range of motion.      Cervical back: Normal range of motion and neck supple.   Skin:     General: Skin is warm and dry.      Capillary Refill: Capillary refill takes less than 2 seconds.   Neurological:      Mental Status: He is alert and oriented to person, place, and time.        Physical Exam           Result Review :  Results                            Assessment and Plan      Diagnoses and all orders for this visit:    1. Neuralgia, post-herpetic (Primary)  Assessment & Plan:  His oxycodone dosage will be increased to 10 mg for a month. A prescription for Bactroban (mupirocin) cream will be provided.  He is advised to apply the cream to the affected area. The potential side effects of oxycodone, including drowsiness and constipation, were discussed.  He is also advised to contact the office if his pain persists or worsens.  A prescription for Valtrex (valacyclovir) will be provided, to be taken once daily. The potential benefits of continuous antiviral therapy in preventing shingles recurrence and reducing associated pain were discussed.    Orders:  -     oxyCODONE-acetaminophen (PERCOCET)  MG per tablet; Take 1 tablet by mouth Every 8 (Eight) Hours As Needed for Moderate Pain.  Dispense: 90 tablet; Refill: 0  -     valACYclovir (VALTREX) 1000 MG tablet; Take 1 tablet by mouth Daily.  Dispense: 30 tablet; Refill: 12    2. Skin irritation  Assessment & Plan:  The patient was given prescription for Bactroban ointment to help treat his  symptoms.  Patient was encouraged to return to clinic if symptoms do not improve.    Orders:  -     mupirocin (BACTROBAN) 2 % ointment; Apply 1 Application topically to the appropriate area as directed 3 (Three) Times a Day.  Dispense: 30 g; Refill: 2    3. Asymptomatic HIV infection, with no history of HIV-related illness       Assessment & Plan             Follow Up   No follow-ups on file.  Patient was given instructions and counseling regarding his condition or for health maintenance advice. Please see specific information pulled into the AVS if appropriate.     Patient or patient representative verbalized consent for the use of Ambient Listening during the visit with  Nirav Dnoovan Sr, MD for chart documentation. 10/31/2024  00:07 EDT  Answers submitted by the patient for this visit:  Other (Submitted on 10/16/2024)  Please describe your symptoms.: Post herpetic pain from Shingles 09/04/2023  Have you had these symptoms before?: Yes  How long have you been having these symptoms?: Greater than 2 weeks  Please list any medications you are currently taking for this condition.: IBUPROFEN Oxycoden  Primary Reason for Visit (Submitted on 10/16/2024)  What is the primary reason for your visit?: Problem Not Listed

## 2024-10-31 PROBLEM — R23.8 SKIN IRRITATION: Status: ACTIVE | Noted: 2024-10-31

## 2024-10-31 NOTE — ASSESSMENT & PLAN NOTE
The patient was given prescription for Bactroban ointment to help treat his symptoms.  Patient was encouraged to return to clinic if symptoms do not improve.

## 2024-10-31 NOTE — ASSESSMENT & PLAN NOTE
His oxycodone dosage will be increased to 10 mg for a month. A prescription for Bactroban (mupirocin) cream will be provided.  He is advised to apply the cream to the affected area. The potential side effects of oxycodone, including drowsiness and constipation, were discussed.  He is also advised to contact the office if his pain persists or worsens.  A prescription for Valtrex (valacyclovir) will be provided, to be taken once daily. The potential benefits of continuous antiviral therapy in preventing shingles recurrence and reducing associated pain were discussed.

## 2024-11-18 ENCOUNTER — OFFICE VISIT (OUTPATIENT)
Dept: FAMILY MEDICINE CLINIC | Facility: CLINIC | Age: 69
End: 2024-11-18
Payer: MEDICARE

## 2024-11-18 VITALS
OXYGEN SATURATION: 98 % | WEIGHT: 166 LBS | BODY MASS INDEX: 21.3 KG/M2 | SYSTOLIC BLOOD PRESSURE: 120 MMHG | HEART RATE: 80 BPM | DIASTOLIC BLOOD PRESSURE: 60 MMHG | RESPIRATION RATE: 18 BRPM | HEIGHT: 74 IN

## 2024-11-18 DIAGNOSIS — D50.9 IRON DEFICIENCY ANEMIA, UNSPECIFIED IRON DEFICIENCY ANEMIA TYPE: ICD-10-CM

## 2024-11-18 DIAGNOSIS — B02.29 NEURALGIA, POST-HERPETIC: Primary | ICD-10-CM

## 2024-11-18 DIAGNOSIS — Z23 NEED FOR VACCINATION: ICD-10-CM

## 2024-11-18 PROCEDURE — 1126F AMNT PAIN NOTED NONE PRSNT: CPT | Performed by: FAMILY MEDICINE

## 2024-11-18 PROCEDURE — 99213 OFFICE O/P EST LOW 20 MIN: CPT | Performed by: FAMILY MEDICINE

## 2024-11-18 PROCEDURE — 90662 IIV NO PRSV INCREASED AG IM: CPT | Performed by: FAMILY MEDICINE

## 2024-11-18 PROCEDURE — G0008 ADMIN INFLUENZA VIRUS VAC: HCPCS | Performed by: FAMILY MEDICINE

## 2024-11-18 RX ORDER — FERROUS SULFATE 325(65) MG
325 TABLET ORAL 2 TIMES DAILY
Qty: 60 TABLET | Refills: 4 | Status: CANCELLED | OUTPATIENT
Start: 2024-11-18

## 2024-11-18 RX ORDER — IBUPROFEN 800 MG/1
800 TABLET, FILM COATED ORAL 2 TIMES DAILY
Qty: 180 TABLET | Refills: 2 | Status: SHIPPED | OUTPATIENT
Start: 2024-11-18

## 2024-11-18 RX ORDER — OXYCODONE AND ACETAMINOPHEN 10; 325 MG/1; MG/1
1 TABLET ORAL EVERY 8 HOURS PRN
Qty: 90 TABLET | Refills: 0 | Status: SHIPPED | OUTPATIENT
Start: 2024-11-18

## 2024-11-18 NOTE — PROGRESS NOTES
"Chief Complaint  Chief Complaint   Patient presents with    Neurologic Problem     Pt here for 1 mon f/u        Subjective    History of Present Illness        Beth Mckinney presents to [facility] for   History of Present Illness  The patient presents for evaluation of multiple medical concerns.    He is currently under the care of a new ophthalmologist, Dr. Syed, who has recommended a new treatment for his pain. Despite the cost not being covered by his insurance, he is willing to pay out-of-pocket due to the persistent nature of his pain, which has lasted for 14 months. He is seeking a prescription for oxycodone to manage his pain until December 2024, after which he plans to travel to [place] for the new treatment. He has not yet completed his blood work, which was ordered over a month ago. He is also requesting a refill of his ibuprofen prescription.    He reports that his shingles have improved and he has not experienced any recent symptoms. He is considering discontinuing his antiviral medication and is inquiring about the shingles vaccine, which he has not yet received.    He is interested in receiving the influenza vaccine during this visit.    He is requesting a blood test to monitor his HIV levels, as he has not seen his primary care physician in over a year.  Neurologic Problem       History of Present Illness      Objective   Vital Signs:   Visit Vitals  /60   Pulse 80   Resp 18   Ht 188 cm (74.02\")   Wt 75.3 kg (166 lb)   SpO2 98%   BMI 21.30 kg/m²          BMI is within normal parameters. No other follow-up for BMI required.     Physical Exam  Vitals reviewed.   Constitutional:       Appearance: He is well-developed.   HENT:      Head: Normocephalic.      Right Ear: External ear normal.      Left Ear: External ear normal.      Nose: Nose normal.   Eyes:      Conjunctiva/sclera: Conjunctivae normal.   Cardiovascular:      Rate and Rhythm: Normal rate and " regular rhythm.   Pulmonary:      Effort: Pulmonary effort is normal.      Breath sounds: Normal breath sounds.   Musculoskeletal:         General: Normal range of motion.      Cervical back: Normal range of motion and neck supple.   Skin:     General: Skin is warm and dry.      Capillary Refill: Capillary refill takes less than 2 seconds.          Neurological:      Mental Status: He is alert and oriented to person, place, and time.        Physical Exam           Result Review :  Results                            Assessment and Plan      Diagnoses and all orders for this visit:    1. Neuralgia, post-herpetic (Primary)  Assessment & Plan:  Patient's symptoms are unchanged.  Patient was given prescription for oxycodone 10 mg.  Patient was advised to continue taking Valtrex.  Patient was advised to get the shingles vaccine.  Patient encouraged to return to clinic    Orders:  -     oxyCODONE-acetaminophen (PERCOCET)  MG per tablet; Take 1 tablet by mouth Every 8 (Eight) Hours As Needed for Moderate Pain.  Dispense: 90 tablet; Refill: 0  -     ibuprofen (ADVIL,MOTRIN) 800 MG tablet; Take 1 tablet by mouth 2 (Two) Times a Day.  Dispense: 180 tablet; Refill: 2    2. Need for vaccination  -     Fluzone High-Dose 65+yrs       Assessment & Plan             Follow Up   No follow-ups on file.  Patient was given instructions and counseling regarding his condition or for health maintenance advice. Please see specific information pulled into the AVS if appropriate.     Patient or patient representative verbalized consent for the use of Ambient Listening during the visit with  Nirav Donovan Sr, MD for chart documentation. 12/9/2024  15:08 EST  Answers submitted by the patient for this visit:  Other (Submitted on 11/16/2024)  Please describe your symptoms.: Pain, follow up!  Have you had these symptoms before?: Yes  How long have you been having these symptoms?: Greater than 2 weeks  Primary Reason for Visit (Submitted on  11/16/2024)  What is the primary reason for your visit?: Problem Not Listed

## 2024-12-09 NOTE — ASSESSMENT & PLAN NOTE
Patient's symptoms are unchanged.  Patient was given prescription for oxycodone 10 mg.  Patient was advised to continue taking Valtrex.  Patient was advised to get the shingles vaccine.  Patient encouraged to return to clinic

## 2024-12-14 DIAGNOSIS — R06.02 SHORTNESS OF BREATH: ICD-10-CM

## 2024-12-15 RX ORDER — ALBUTEROL SULFATE 90 UG/1
AEROSOL, METERED RESPIRATORY (INHALATION)
Qty: 18 G | Refills: 2 | OUTPATIENT
Start: 2024-12-15

## 2025-01-17 DIAGNOSIS — B02.29 NEURALGIA, POST-HERPETIC: ICD-10-CM

## 2025-01-17 RX ORDER — OXYCODONE AND ACETAMINOPHEN 10; 325 MG/1; MG/1
1 TABLET ORAL EVERY 8 HOURS PRN
Qty: 90 TABLET | Refills: 0 | Status: SHIPPED | OUTPATIENT
Start: 2025-01-17

## 2025-01-17 NOTE — TELEPHONE ENCOUNTER
Rx Refill Note  Requested Prescriptions     Pending Prescriptions Disp Refills    oxyCODONE-acetaminophen (PERCOCET)  MG per tablet 90 tablet 0     Sig: Take 1 tablet by mouth Every 8 (Eight) Hours As Needed for Moderate Pain.      Last office visit with prescribing clinician: 11/18/2024   Last telemedicine visit with prescribing clinician: Visit date not found   Next office visit with prescribing clinician: 1/22/2025                         Would you like a call back once the refill request has been completed: [] Yes [] No    If the office needs to give you a call back, can they leave a voicemail: [] Yes [] No    Bonita Alonzo MA  01/17/25, 08:52 EST

## 2025-01-22 ENCOUNTER — OFFICE VISIT (OUTPATIENT)
Dept: FAMILY MEDICINE CLINIC | Facility: CLINIC | Age: 70
End: 2025-01-22
Payer: MEDICARE

## 2025-01-22 ENCOUNTER — TELEPHONE (OUTPATIENT)
Dept: FAMILY MEDICINE CLINIC | Facility: CLINIC | Age: 70
End: 2025-01-22

## 2025-01-22 VITALS
WEIGHT: 167 LBS | HEIGHT: 74 IN | DIASTOLIC BLOOD PRESSURE: 90 MMHG | BODY MASS INDEX: 21.43 KG/M2 | SYSTOLIC BLOOD PRESSURE: 158 MMHG | HEART RATE: 90 BPM | OXYGEN SATURATION: 96 % | RESPIRATION RATE: 17 BRPM

## 2025-01-22 DIAGNOSIS — I10 PRIMARY HYPERTENSION: ICD-10-CM

## 2025-01-22 DIAGNOSIS — B02.29 NEURALGIA, POST-HERPETIC: Primary | ICD-10-CM

## 2025-01-22 PROCEDURE — 3080F DIAST BP >= 90 MM HG: CPT | Performed by: FAMILY MEDICINE

## 2025-01-22 PROCEDURE — 3077F SYST BP >= 140 MM HG: CPT | Performed by: FAMILY MEDICINE

## 2025-01-22 PROCEDURE — 1126F AMNT PAIN NOTED NONE PRSNT: CPT | Performed by: FAMILY MEDICINE

## 2025-01-22 PROCEDURE — 99214 OFFICE O/P EST MOD 30 MIN: CPT | Performed by: FAMILY MEDICINE

## 2025-01-22 PROCEDURE — G2211 COMPLEX E/M VISIT ADD ON: HCPCS | Performed by: FAMILY MEDICINE

## 2025-01-22 RX ORDER — PREGABALIN 75 MG/1
75 CAPSULE ORAL 2 TIMES DAILY
Qty: 60 CAPSULE | Refills: 1 | Status: SHIPPED | OUTPATIENT
Start: 2025-01-22

## 2025-01-22 RX ORDER — ACYCLOVIR 200 MG/1
200 CAPSULE ORAL 2 TIMES DAILY
COMMUNITY

## 2025-01-22 RX ORDER — LOSARTAN POTASSIUM AND HYDROCHLOROTHIAZIDE 12.5; 5 MG/1; MG/1
1 TABLET ORAL DAILY
Qty: 90 TABLET | Refills: 2 | Status: SHIPPED | OUTPATIENT
Start: 2025-01-22

## 2025-01-22 RX ORDER — LOSARTAN POTASSIUM AND HYDROCHLOROTHIAZIDE 12.5; 5 MG/1; MG/1
1 TABLET ORAL DAILY
Qty: 30 TABLET | Refills: 12 | Status: SHIPPED | OUTPATIENT
Start: 2025-01-22 | End: 2025-01-22 | Stop reason: SDUPTHER

## 2025-01-22 NOTE — PROGRESS NOTES
Chief Complaint  Chief Complaint   Patient presents with    Neuralgia     2 mon f/u    Procedure     Pt states he is supposed to be having a cornea surgery and what's to speak with PCP first        Subjective    History of Present Illness        Beth Mckinney presents to [facility] for   History of Present Illness  The patient presents for evaluation of pain management and elevated blood pressure.    He has been referred to an ophthalmologist for a second opinion regarding her eye condition, which is scheduled for next week.  He reports persistent pain, which varies in intensity, and notes that OxyContin provides minimal relief as it does not effectively address nerve pain.  He has discontinued nerve pain medication due to adverse effects such as hallucinations and falls.  He has previously tried Lyrica without success and has also experienced side effects from gabapentin, including sickness, falls, hallucinations, and night sweats.  He is considering a trial of Lyrica at a lower dose.  He reports experiencing burning sensations in his eyes and a swollen head, with palpable lumps..  He is requesting a refill of his OxyContin prescription, which he uses sparingly, typically lasting her approximately 2 months.  He underwent eye surgery approximately 3 weeks ago, during which a lens was implanted. However, the procedure did not yield the expected results, and he was advised to seek pain management at an optic specialist.    He reports that her blood pressure was slightly elevated today.  He attributes this to a new onset of severe headaches.  He is reluctant to start antihypertensive medication and prefers to manage her blood pressure through lifestyle modifications.  He owns a blood pressure cuff and is willing to monitor her blood pressure daily or at least 4 times a week.    MEDICATIONS  Current: OxyContin, ibuprofen, Biktarvy  Past: pregabalin, gabapentin      Follow up  "from Shingles Pain 9/05/2023  Symptoms are: chronic.   Onset was 1 to 5 years.   Symptoms occur: constantly.  Symptoms include: joint pain, fatigue, headaches, joint swelling and visual change.   Pertinent negative symptoms include no abdominal pain, no anorexia, no change in stool, no chest pain, no chills, no congestion, no cough, no diaphoresis, no fever, no myalgias, no nausea, no neck pain, no numbness, no rash, no sore throat, no swollen glands, no dysuria, no vertigo, no vomiting and no weakness.      History of Present Illness      Objective   Vital Signs:   Visit Vitals  /90   Pulse 90   Resp 17   Ht 188 cm (74.02\")   Wt 75.8 kg (167 lb)   SpO2 96%   BMI 21.43 kg/m²          BMI is within normal parameters. No other follow-up for BMI required.     Physical Exam  Vitals reviewed.   Constitutional:       Appearance: He is well-developed.   HENT:      Head: Normocephalic.      Right Ear: External ear normal.      Left Ear: External ear normal.      Nose: Nose normal.   Eyes:      Conjunctiva/sclera: Conjunctivae normal.   Cardiovascular:      Rate and Rhythm: Normal rate and regular rhythm.   Pulmonary:      Effort: Pulmonary effort is normal.      Breath sounds: Normal breath sounds.   Musculoskeletal:         General: Normal range of motion.      Cervical back: Normal range of motion and neck supple.   Skin:     General: Skin is warm and dry.      Capillary Refill: Capillary refill takes less than 2 seconds.   Neurological:      Mental Status: He is alert and oriented to person, place, and time.        Physical Exam           Result Review :  Results                            Assessment and Plan      Diagnoses and all orders for this visit:    1. Neuralgia, post-herpetic (Primary)  Assessment & Plan:  He reports significant pain following a recent eye surgery and ongoing issues related to shingles.  He has been on OxyContin, which provides minimal relief for his nerve pain. A prescription for " pregabalin 75 mg twice daily will be provided, but he is advised to start with a once-daily dose to assess tolerance. If well-tolerated, the dosage can be increased. A referral to pain management will be initiated.    Orders:  -     pregabalin (Lyrica) 75 MG capsule; Take 1 capsule by mouth 2 (Two) Times a Day.  Dispense: 60 capsule; Refill: 1    2. Primary hypertension  Assessment & Plan:  Hypertension is stable and controlled  Continue current treatment regimen.  Dietary sodium restriction.  Weight loss.  Regular aerobic exercise.  Blood pressure will be reassessed in 6 months.    Orders:  -     Discontinue: losartan-hydrochlorothiazide (HYZAAR) 50-12.5 MG per tablet; Take 1 tablet by mouth Daily.  Dispense: 30 tablet; Refill: 12  -     losartan-hydrochlorothiazide (HYZAAR) 50-12.5 MG per tablet; Take 1 tablet by mouth Daily.  Dispense: 90 tablet; Refill: 2       Assessment & Plan             Follow Up   No follow-ups on file.  Patient was given instructions and counseling regarding his condition or for health maintenance advice. Please see specific information pulled into the AVS if appropriate.     Patient or patient representative verbalized consent for the use of Ambient Listening during the visit with  Nirav Donovan Sr, MD for chart documentation. 1/31/2025  11:41 EST  Answers submitted by the patient for this visit:  Primary Reason for Visit (Submitted on 1/20/2025)  What is the primary reason for your visit?: Problem Not Listed

## 2025-01-31 PROBLEM — I10 PRIMARY HYPERTENSION: Status: ACTIVE | Noted: 2025-01-31

## 2025-02-01 NOTE — ASSESSMENT & PLAN NOTE
He reports significant pain following a recent eye surgery and ongoing issues related to shingles.  He has been on OxyContin, which provides minimal relief for his nerve pain. A prescription for pregabalin 75 mg twice daily will be provided, but he is advised to start with a once-daily dose to assess tolerance. If well-tolerated, the dosage can be increased. A referral to pain management will be initiated.

## 2025-02-03 ENCOUNTER — TELEPHONE (OUTPATIENT)
Dept: FAMILY MEDICINE CLINIC | Facility: CLINIC | Age: 70
End: 2025-02-03
Payer: MEDICARE

## 2025-02-03 DIAGNOSIS — B02.29 POST HERPETIC NEURALGIA: Primary | ICD-10-CM

## 2025-02-20 ENCOUNTER — OFFICE VISIT (OUTPATIENT)
Dept: FAMILY MEDICINE CLINIC | Facility: CLINIC | Age: 70
End: 2025-02-20
Payer: MEDICARE

## 2025-02-20 VITALS
HEIGHT: 74 IN | WEIGHT: 162 LBS | HEART RATE: 70 BPM | OXYGEN SATURATION: 97 % | DIASTOLIC BLOOD PRESSURE: 60 MMHG | BODY MASS INDEX: 20.79 KG/M2 | SYSTOLIC BLOOD PRESSURE: 90 MMHG | RESPIRATION RATE: 18 BRPM

## 2025-02-20 DIAGNOSIS — B02.29 NEURALGIA, POST-HERPETIC: ICD-10-CM

## 2025-02-20 DIAGNOSIS — D50.9 IRON DEFICIENCY ANEMIA, UNSPECIFIED IRON DEFICIENCY ANEMIA TYPE: ICD-10-CM

## 2025-02-20 PROCEDURE — 3074F SYST BP LT 130 MM HG: CPT | Performed by: FAMILY MEDICINE

## 2025-02-20 PROCEDURE — 3078F DIAST BP <80 MM HG: CPT | Performed by: FAMILY MEDICINE

## 2025-02-20 PROCEDURE — 99214 OFFICE O/P EST MOD 30 MIN: CPT | Performed by: FAMILY MEDICINE

## 2025-02-20 RX ORDER — OXYCODONE AND ACETAMINOPHEN 10; 325 MG/1; MG/1
1 TABLET ORAL EVERY 8 HOURS PRN
Qty: 90 TABLET | Refills: 0 | Status: SHIPPED | OUTPATIENT
Start: 2025-02-20

## 2025-02-20 RX ORDER — FERROUS SULFATE 325(65) MG
325 TABLET ORAL 2 TIMES DAILY
Qty: 180 TABLET | Refills: 3 | Status: SHIPPED | OUTPATIENT
Start: 2025-02-20

## 2025-02-20 NOTE — PROGRESS NOTES
"Chief Complaint  Chief Complaint   Patient presents with    Neuralgia     Pt here for 1 month f/u on medication     Medication Problem     Pt here to discuss possible side effects form medication       Subjective    History of Present Illness        Beth Mckinney presents to [facility] for     Symptoms are: chronic.   Onset was 1 to 5 years.   Symptoms occur: constantly.  Symptoms include: joint pain, headaches and visual change.   Pertinent negative symptoms include no abdominal pain, no anorexia, no change in stool, no chest pain, no chills, no congestion, no cough, no diaphoresis, no fatigue, no fever, no joint swelling, no myalgias, no nausea, no neck pain, no numbness, no rash, no sore throat, no swollen glands, no dysuria, no vertigo, no vomiting and no weakness.      History of Present Illness      Objective   Vital Signs:   Visit Vitals  BP 90/60   Pulse 70   Resp 18   Ht 188 cm (74.02\")   Wt 73.5 kg (162 lb)   SpO2 97%   BMI 20.79 kg/m²          BMI is within normal parameters. No other follow-up for BMI required.     Physical Exam  Vitals reviewed.   Constitutional:       Appearance: He is well-developed.   HENT:      Head: Normocephalic.      Right Ear: External ear normal.      Left Ear: External ear normal.      Nose: Nose normal.   Eyes:      Conjunctiva/sclera: Conjunctivae normal.   Cardiovascular:      Rate and Rhythm: Normal rate and regular rhythm.   Pulmonary:      Effort: Pulmonary effort is normal.      Breath sounds: Normal breath sounds.   Musculoskeletal:         General: Normal range of motion.      Cervical back: Normal range of motion and neck supple.   Skin:     General: Skin is warm and dry.      Capillary Refill: Capillary refill takes less than 2 seconds.   Neurological:      Mental Status: He is alert and oriented to person, place, and time.        Physical Exam           Result Review :  Results                            Assessment and Plan "      Diagnoses and all orders for this visit:    1. Neuralgia, post-herpetic  Assessment & Plan:  Due to his significant pain he was given a refill of oxycodone to help treat his symptoms.  Will likely have to refer the patient to pain management.    Orders:  -     oxyCODONE-acetaminophen (PERCOCET)  MG per tablet; Take 1 tablet by mouth Every 8 (Eight) Hours As Needed for Moderate Pain.  Dispense: 90 tablet; Refill: 0    2. Iron deficiency anemia, unspecified iron deficiency anemia type  Comments:  He will be prescribed ferrous sulfate 325 mg; take 1 tablet by mouth every 8 hours as needed.   The patient will have laboratory workup done on 10/30/2023.  Assessment & Plan:  Patient was given prescription for ferrous sulfate and advised to take it twice a day.  This will need to be rechecked in 3 months.    Orders:  -     ferrous sulfate 325 (65 FE) MG tablet; Take 1 tablet by mouth 2 (Two) Times a Day.  Dispense: 180 tablet; Refill: 3       Assessment & Plan             Follow Up   No follow-ups on file.  Patient was given instructions and counseling regarding his condition or for health maintenance advice. Please see specific information pulled into the AVS if appropriate.

## 2025-03-10 NOTE — ASSESSMENT & PLAN NOTE
Due to his significant pain he was given a refill of oxycodone to help treat his symptoms.  Will likely have to refer the patient to pain management.

## 2025-03-10 NOTE — ASSESSMENT & PLAN NOTE
Patient was given prescription for ferrous sulfate and advised to take it twice a day.  This will need to be rechecked in 3 months.

## 2025-03-22 DIAGNOSIS — B02.29 NEURALGIA, POST-HERPETIC: ICD-10-CM

## 2025-03-23 RX ORDER — PREGABALIN 75 MG/1
75 CAPSULE ORAL 2 TIMES DAILY
Qty: 60 CAPSULE | Refills: 1 | Status: SHIPPED | OUTPATIENT
Start: 2025-03-23

## 2025-03-27 ENCOUNTER — OFFICE VISIT (OUTPATIENT)
Dept: FAMILY MEDICINE CLINIC | Facility: CLINIC | Age: 70
End: 2025-03-27
Payer: MEDICARE

## 2025-03-27 VITALS
DIASTOLIC BLOOD PRESSURE: 80 MMHG | RESPIRATION RATE: 18 BRPM | WEIGHT: 170 LBS | BODY MASS INDEX: 21.82 KG/M2 | OXYGEN SATURATION: 95 % | SYSTOLIC BLOOD PRESSURE: 124 MMHG | HEART RATE: 91 BPM | HEIGHT: 74 IN

## 2025-03-27 DIAGNOSIS — Z21 ASYMPTOMATIC HIV INFECTION, WITH NO HISTORY OF HIV-RELATED ILLNESS: ICD-10-CM

## 2025-03-27 DIAGNOSIS — B02.29 NEURALGIA, POST-HERPETIC: ICD-10-CM

## 2025-03-27 PROCEDURE — 3079F DIAST BP 80-89 MM HG: CPT | Performed by: FAMILY MEDICINE

## 2025-03-27 PROCEDURE — 99214 OFFICE O/P EST MOD 30 MIN: CPT | Performed by: FAMILY MEDICINE

## 2025-03-27 PROCEDURE — 3074F SYST BP LT 130 MM HG: CPT | Performed by: FAMILY MEDICINE

## 2025-03-27 PROCEDURE — 1126F AMNT PAIN NOTED NONE PRSNT: CPT | Performed by: FAMILY MEDICINE

## 2025-03-27 RX ORDER — OXYCODONE AND ACETAMINOPHEN 10; 325 MG/1; MG/1
1 TABLET ORAL EVERY 8 HOURS PRN
Qty: 90 TABLET | Refills: 0 | Status: SHIPPED | OUTPATIENT
Start: 2025-03-27

## 2025-03-27 RX ORDER — BICTEGRAVIR SODIUM, EMTRICITABINE, AND TENOFOVIR ALAFENAMIDE FUMARATE 50; 200; 25 MG/1; MG/1; MG/1
1 TABLET ORAL DAILY
Qty: 90 TABLET | Refills: 2 | Status: SHIPPED | OUTPATIENT
Start: 2025-03-27

## 2025-04-14 NOTE — ASSESSMENT & PLAN NOTE
Stable  He was given a refill on Biktarvy.  He has no complaints at this time.  Patient is encouraged to return to clinic as needed.

## 2025-04-14 NOTE — ASSESSMENT & PLAN NOTE
He reports severe pain in his left eye, which he describes as a 9 out of 10. The pain is attributed to scar tissue from previous shingles and a recent cornea replacement surgery. He is scheduled for another surgery on Tuesday to insert a lens and sew his eyelids shut for 2 weeks to facilitate healing. OxyContin helps manage the pain, but he prefers not to stay on it long-term.

## 2025-04-14 NOTE — PROGRESS NOTES
Chief Complaint  Chief Complaint   Patient presents with    Procedure     Pt here to f/u on cornea surgery        Subjective    History of Present Illness        Beth Mckinney presents to [facility] for   History of Present Illness  The patient is a 69-year-old male who presents for evaluation of left eye pain, blood pressure management, and medication management.    He is scheduled for a lens implantation surgery on the upcoming Tuesday, which will necessitate the temporary closure of his eyelids for a duration of 2 weeks. This procedure is deemed necessary due to the presence of scar tissue from a previous shingles infection, which has been identified as the primary cause of his persistent ocular pain. Despite undergoing a corneal replacement surgery in September, his pain levels have remained high, fluctuating between 4 and 9 on the pain scale. He reports that his eye has become swollen again post-surgery. His vision remains compromised, with blurriness persisting even when his eyes are closed. He is currently managing his pain with OxyContin but expresses a desire to discontinue its use.    He is also seeking a refill of his Biktarvy prescription, which he last refilled in the previous month. He is considering a change in his medication regimen and is contemplating whether a consultation with a specialist would be beneficial. He reports feeling fatigued recently and recalls a similar episode in the past that required blood transfusions.    He has been monitoring his blood pressure daily and reports that it has been consistently within the normal range. He attributes previous elevated readings to stress related to his mother's death and an upcoming surgery. He is questioning the necessity of continuing his antihypertensive medication, which he has been taking for the past 5 weeks.    Follow up discussion for Eye Surgery on 03/11/2024 related to SHINGLES diagnosis  "9/04/2023  Symptoms are: chronic.   Onset was 1 to 5 years.   Symptoms occur: constantly.  Symptoms include: joint pain, cough and headaches.   Pertinent negative symptoms include no abdominal pain, no anorexia, no change in stool, no chest pain, no chills, no congestion, no diaphoresis, no fatigue, no fever, no joint swelling, no myalgias, no nausea, no neck pain, no numbness, no rash, no sore throat, no swollen glands, no dysuria, no vertigo, no visual change, no vomiting and no weakness.      History of Present Illness      Objective   Vital Signs:   Visit Vitals  /80   Pulse 91   Resp 18   Ht 188 cm (74.02\")   Wt 77.1 kg (170 lb)   SpO2 95%   BMI 21.81 kg/m²          BMI is within normal parameters. No other follow-up for BMI required.     Physical Exam  Vitals reviewed.   Constitutional:       Appearance: He is well-developed.   HENT:      Head: Normocephalic.      Right Ear: External ear normal.      Left Ear: External ear normal.      Nose: Nose normal.   Eyes:      Conjunctiva/sclera: Conjunctivae normal.   Cardiovascular:      Rate and Rhythm: Normal rate and regular rhythm.   Pulmonary:      Effort: Pulmonary effort is normal.      Breath sounds: Normal breath sounds.   Musculoskeletal:         General: Normal range of motion.      Cervical back: Normal range of motion and neck supple.   Skin:     General: Skin is warm and dry.      Capillary Refill: Capillary refill takes less than 2 seconds.   Neurological:      Mental Status: He is alert and oriented to person, place, and time.        Physical Exam  Lungs were auscultated.    Vital Signs  Blood pressure is 124/80.         Result Review :  Results                            Assessment and Plan      Diagnoses and all orders for this visit:    1. Asymptomatic HIV infection, with no history of HIV-related illness  Assessment & Plan:  Stable  He was given a refill on Biktarvy.  He has no complaints at this time.  Patient is encouraged to return to " clinic as needed.    Orders:  -     Bictegravir-Emtricitab-Tenofov (Biktarvy) -25 MG per tablet; Take 1 tablet by mouth Daily.  Dispense: 90 tablet; Refill: 2    2. Neuralgia, post-herpetic  Assessment & Plan:  He reports severe pain in his left eye, which he describes as a 9 out of 10. The pain is attributed to scar tissue from previous shingles and a recent cornea replacement surgery. He is scheduled for another surgery on Tuesday to insert a lens and sew his eyelids shut for 2 weeks to facilitate healing. OxyContin helps manage the pain, but he prefers not to stay on it long-term.    Orders:  -     oxyCODONE-acetaminophen (PERCOCET)  MG per tablet; Take 1 tablet by mouth Every 8 (Eight) Hours As Needed for Moderate Pain.  Dispense: 90 tablet; Refill: 0       Assessment & Plan             Follow Up   No follow-ups on file.  Patient was given instructions and counseling regarding his condition or for health maintenance advice. Please see specific information pulled into the AVS if appropriate.     Patient or patient representative verbalized consent for the use of Ambient Listening during the visit with  Nirav Donovan Sr, MD for chart documentation. 4/14/2025  01:27 EDT

## 2025-04-23 ENCOUNTER — OFFICE VISIT (OUTPATIENT)
Dept: FAMILY MEDICINE CLINIC | Facility: CLINIC | Age: 70
End: 2025-04-23
Payer: MEDICARE

## 2025-04-23 VITALS
RESPIRATION RATE: 16 BRPM | HEART RATE: 72 BPM | HEIGHT: 74 IN | OXYGEN SATURATION: 96 % | DIASTOLIC BLOOD PRESSURE: 68 MMHG | SYSTOLIC BLOOD PRESSURE: 132 MMHG | BODY MASS INDEX: 22.33 KG/M2 | WEIGHT: 174 LBS

## 2025-04-23 DIAGNOSIS — B02.29 NEURALGIA, POST-HERPETIC: Primary | ICD-10-CM

## 2025-04-23 DIAGNOSIS — H54.8 LEGALLY BLIND: ICD-10-CM

## 2025-04-23 PROCEDURE — 3078F DIAST BP <80 MM HG: CPT | Performed by: FAMILY MEDICINE

## 2025-04-23 PROCEDURE — 1126F AMNT PAIN NOTED NONE PRSNT: CPT | Performed by: FAMILY MEDICINE

## 2025-04-23 PROCEDURE — 99214 OFFICE O/P EST MOD 30 MIN: CPT | Performed by: FAMILY MEDICINE

## 2025-04-23 PROCEDURE — 3075F SYST BP GE 130 - 139MM HG: CPT | Performed by: FAMILY MEDICINE

## 2025-04-23 RX ORDER — PREGABALIN 75 MG/1
75 CAPSULE ORAL 3 TIMES DAILY
Qty: 90 CAPSULE | Refills: 1 | Status: SHIPPED | OUTPATIENT
Start: 2025-04-23

## 2025-04-23 RX ORDER — OXYCODONE AND ACETAMINOPHEN 10; 325 MG/1; MG/1
1 TABLET ORAL EVERY 8 HOURS PRN
Qty: 90 TABLET | Refills: 0 | Status: SHIPPED | OUTPATIENT
Start: 2025-04-23

## 2025-04-23 NOTE — PROGRESS NOTES
"Chief Complaint  Chief Complaint   Patient presents with    Neuralgia     Pt here for 1 mon f/u     Post-op Follow-up     Pt here for f/u of eye procedure        Subjective    History of Present Illness        Beth Mckinney presents to [facility] for   History of Present Illness  The patient presents for evaluation of pain management, blood pressure management, and forehead swelling.    Persistent pain is reported, attributed to scar tissue from a previous shingles infection. Stitches were removed from his eye on Monday after being in place for three weeks. He is currently on gabapentin, which he finds beneficial for pain management, and has expressed interest in increasing the dosage, though he has concerns about potential side effects. Steroids are also part of his current regimen.    A week-long discontinuation of antihypertensive medication was attempted, during which blood pressure remained stable, but headaches worsened. Concerned about the risk of a stroke, he resumed his daily antihypertensive medication.    A sensation of swelling in the forehead is described, which he believes feels like a nodule and is the source of his pain.    Follow up pertaining to Shingles diagnosis 09/04/2023  Symptoms are: chronic.   Onset was 1 to 5 years.   Symptoms occur: constantly.  Symptoms include: joint pain and headaches.   Pertinent negative symptoms include no abdominal pain, no anorexia, no change in stool, no chest pain, no chills, no congestion, no cough, no diaphoresis, no fatigue, no fever, no joint swelling, no myalgias, no nausea, no neck pain, no numbness, no rash, no sore throat, no swollen glands, no dysuria, no vertigo, no visual change, no vomiting and no weakness.   Treatment and/or Medications comments include: Practically everything!!      History of Present Illness      Objective   Vital Signs:   Visit Vitals  /68   Pulse 72   Resp 16   Ht 188 cm (74.02\") "   Wt 78.9 kg (174 lb)   SpO2 96%   BMI 22.33 kg/m²          BMI is within normal parameters. No other follow-up for BMI required.     Physical Exam  Vitals reviewed.   Constitutional:       Appearance: He is well-developed.   HENT:      Head: Normocephalic.      Right Ear: External ear normal.      Left Ear: External ear normal.      Nose: Nose normal.   Eyes:      Conjunctiva/sclera: Conjunctivae normal.   Cardiovascular:      Rate and Rhythm: Normal rate and regular rhythm.   Pulmonary:      Effort: Pulmonary effort is normal.      Breath sounds: Normal breath sounds.   Musculoskeletal:         General: Normal range of motion.      Cervical back: Normal range of motion and neck supple.   Skin:     General: Skin is warm and dry.      Capillary Refill: Capillary refill takes less than 2 seconds.   Neurological:      Mental Status: He is alert and oriented to person, place, and time.        Physical Exam  Head: Normocephalic, atraumatic         Result Review :  Results                            Assessment and Plan      Diagnoses and all orders for this visit:    1. Neuralgia, post-herpetic (Primary)  Assessment & Plan:  - Persistent pain following shingles with significant scar tissue.  - Gabapentin has been effective in managing pain.  - Discussed increasing gabapentin dosage to 75 mg three times daily.  - Signed pain medication contract and will conduct a urine drug screen today.    Orders:  -     oxyCODONE-acetaminophen (PERCOCET)  MG per tablet; Take 1 tablet by mouth Every 8 (Eight) Hours As Needed for Moderate Pain.  Dispense: 90 tablet; Refill: 0  -     pregabalin (Lyrica) 75 MG capsule; Take 1 capsule by mouth 3 (Three) Times a Day.  Dispense: 90 capsule; Refill: 1    2. Legally blind  Assessment & Plan:  - Vision affected by recent stitches removal and prolonged eye closure.  - Referral to pain management for ophthalmic evaluation is pending.  - Patient experiencing itching and discomfort  post-stitches removal.  - Advised to follow up with ophthalmology for further assessment and management.         Assessment & Plan             Follow Up   No follow-ups on file.  Patient was given instructions and counseling regarding his condition or for health maintenance advice. Please see specific information pulled into the AVS if appropriate.     Patient or patient representative verbalized consent for the use of Ambient Listening during the visit with  Nirav Donovan Sr, MD for chart documentation. 4/24/2025  13:41 EDT

## 2025-04-24 NOTE — ASSESSMENT & PLAN NOTE
- Vision affected by recent stitches removal and prolonged eye closure.  - Referral to pain management for ophthalmic evaluation is pending.  - Patient experiencing itching and discomfort post-stitches removal.  - Advised to follow up with ophthalmology for further assessment and management.

## 2025-04-24 NOTE — ASSESSMENT & PLAN NOTE
- Persistent pain following shingles with significant scar tissue.  - Gabapentin has been effective in managing pain.  - Discussed increasing gabapentin dosage to 75 mg three times daily.  - Signed pain medication contract and will conduct a urine drug screen today.

## 2025-06-18 ENCOUNTER — OFFICE VISIT (OUTPATIENT)
Dept: FAMILY MEDICINE CLINIC | Facility: CLINIC | Age: 70
End: 2025-06-18
Payer: MEDICARE

## 2025-06-18 VITALS — SYSTOLIC BLOOD PRESSURE: 118 MMHG | OXYGEN SATURATION: 94 % | HEART RATE: 74 BPM | DIASTOLIC BLOOD PRESSURE: 68 MMHG

## 2025-06-18 DIAGNOSIS — T86.8402 CORNEAL TRANSPLANT REJECTION, LEFT EYE: ICD-10-CM

## 2025-06-18 DIAGNOSIS — B02.29 NEURALGIA, POST-HERPETIC: ICD-10-CM

## 2025-06-18 DIAGNOSIS — R51.9 NONINTRACTABLE HEADACHE, UNSPECIFIED CHRONICITY PATTERN, UNSPECIFIED HEADACHE TYPE: Primary | ICD-10-CM

## 2025-06-18 RX ORDER — OXYCODONE AND ACETAMINOPHEN 10; 325 MG/1; MG/1
1 TABLET ORAL EVERY 8 HOURS PRN
Qty: 90 TABLET | Refills: 0 | Status: SHIPPED | OUTPATIENT
Start: 2025-06-18

## 2025-06-18 RX ORDER — CARBOXYMETHYLCELLULOSE SODIUM 5 MG/ML
1 SOLUTION/ DROPS OPHTHALMIC
COMMUNITY

## 2025-06-18 NOTE — PROGRESS NOTES
Chief Complaint  Chief Complaint   Patient presents with    Neuralgia, post-herpetic     6 weeks f/u       Subjective    History of Present Illness        Beth Mckinney presents to [facility] for   History of Present Illness  Patient is a 70-year-old male is being seen in the clinic for postherpetic pain and corneal transplant rejection.  He reports that his herpetic pain has not improved currently the left side of his head and face are swollen and he is not sure if this is due to his herpetic pain or corneal rejection.  He was referred to an eye specialist who has advised him to consider another cornea replacement with a different material.    Follow up from [facility] 09/04/2023  Symptoms are: chronic.   Onset was 1 to 5 years.   Symptoms occur: constantly.  Symptoms include: nasal congestion and headaches.   Pertinent negative symptoms include no abdominal pain, no anorexia, no joint pain, no change in stool, no chest pain, no chills, no cough, no diaphoresis, no fatigue, no fever, no joint swelling, no myalgias, no nausea, no neck pain, no numbness, no rash, no sore throat, no swollen glands, no dysuria, no vertigo, no visual change, no vomiting and no weakness.      History of Present Illness      Objective   Vital Signs:   Visit Vitals  /68   Pulse 74   SpO2 94%          BMI is within normal parameters. No other follow-up for BMI required.     Physical Exam  Vitals reviewed.   Constitutional:       Appearance: He is well-developed.   HENT:      Head: Normocephalic.      Right Ear: External ear normal.      Left Ear: External ear normal.      Nose: Nose normal.   Eyes:      Conjunctiva/sclera: Conjunctivae normal.      Comments: Corneal implant rejection  Discolored cornea   Cardiovascular:      Rate and Rhythm: Normal rate and regular rhythm.   Pulmonary:      Effort: Pulmonary effort is normal.      Breath sounds: Normal breath sounds.   Musculoskeletal:          General: Normal range of motion.      Cervical back: Normal range of motion and neck supple.   Skin:     General: Skin is warm and dry.      Capillary Refill: Capillary refill takes less than 2 seconds.   Neurological:      Mental Status: He is alert and oriented to person, place, and time.        Physical Exam           Result Review :  Results                            Assessment and Plan      Diagnoses and all orders for this visit:    1. Nonintractable headache, unspecified chronicity pattern, unspecified headache type (Primary)  Assessment & Plan:    Due to his pain a CT scan of his head was ordered.      Orders:  -     CT Head Without Contrast; Future    2. Corneal transplant rejection, left eye  Assessment & Plan:  Due to his pain and swelling in the left side of his face a CT scan was ordered and results pending.    Orders:  -     CT Head Without Contrast; Future    3. Neuralgia, post-herpetic  Assessment & Plan:  His pain is persistent.  He was given a refill on oxycodone in order to treat his symptoms.  Patient was encouraged return to clinic in 3 months for follow-up.    Orders:  -     oxyCODONE-acetaminophen (PERCOCET)  MG per tablet; Take 1 tablet by mouth Every 8 (Eight) Hours As Needed for Moderate Pain.  Dispense: 90 tablet; Refill: 0       Assessment & Plan             Follow Up   No follow-ups on file.  Patient was given instructions and counseling regarding his condition or for health maintenance advice. Please see specific information pulled into the AVS if appropriate.

## 2025-06-19 NOTE — ASSESSMENT & PLAN NOTE
Due to his pain and swelling in the left side of his face a CT scan was ordered and results pending.

## 2025-06-19 NOTE — ASSESSMENT & PLAN NOTE
His pain is persistent.  He was given a refill on oxycodone in order to treat his symptoms.  Patient was encouraged return to clinic in 3 months for follow-up.

## 2025-06-30 RX ORDER — CARBOXYMETHYLCELLULOSE SODIUM 5 MG/ML
1 SOLUTION/ DROPS OPHTHALMIC DAILY PRN
Qty: 30 ML | Refills: 3 | Status: SHIPPED | OUTPATIENT
Start: 2025-06-30

## 2025-07-11 ENCOUNTER — HOSPITAL ENCOUNTER (OUTPATIENT)
Dept: CT IMAGING | Facility: HOSPITAL | Age: 70
Discharge: HOME | End: 2025-07-11
Payer: MEDICARE

## 2025-07-11 DIAGNOSIS — Z12.5 SCREENING FOR PROSTATE CANCER: ICD-10-CM

## 2025-07-11 DIAGNOSIS — E11.69 TYPE 2 DIABETES MELLITUS WITH OTHER SPECIFIED COMPLICATION, WITHOUT LONG-TERM CURRENT USE OF INSULIN: ICD-10-CM

## 2025-07-11 DIAGNOSIS — I10 PRIMARY HYPERTENSION: Primary | ICD-10-CM

## 2025-07-11 DIAGNOSIS — R51.9 NONINTRACTABLE HEADACHE, UNSPECIFIED CHRONICITY PATTERN, UNSPECIFIED HEADACHE TYPE: ICD-10-CM

## 2025-07-11 DIAGNOSIS — T86.8402 CORNEAL TRANSPLANT REJECTION, LEFT EYE: ICD-10-CM

## 2025-07-11 PROCEDURE — 70450 CT HEAD/BRAIN W/O DYE: CPT

## 2025-07-17 DIAGNOSIS — B02.29 NEURALGIA, POST-HERPETIC: ICD-10-CM

## 2025-07-18 RX ORDER — PREGABALIN 75 MG/1
75 CAPSULE ORAL 3 TIMES DAILY
Qty: 90 CAPSULE | Refills: 1 | Status: SHIPPED | OUTPATIENT
Start: 2025-07-18

## 2025-07-18 NOTE — TELEPHONE ENCOUNTER
Rx Refill Note  Requested Prescriptions     Pending Prescriptions Disp Refills    pregabalin (Lyrica) 75 MG capsule 90 capsule 1     Sig: Take 1 capsule by mouth 3 (Three) Times a Day.      Last office visit with prescribing clinician: 6/18/2025   Last telemedicine visit with prescribing clinician: Visit date not found   Next office visit with prescribing clinician: 7/31/2025                         Would you like a call back once the refill request has been completed: [] Yes [] No    If the office needs to give you a call back, can they leave a voicemail: [] Yes [] No    Bonita Alonzo MA  07/18/25, 13:39 EDT

## 2025-07-31 ENCOUNTER — OFFICE VISIT (OUTPATIENT)
Dept: FAMILY MEDICINE CLINIC | Facility: CLINIC | Age: 70
End: 2025-07-31
Payer: MEDICARE

## 2025-07-31 VITALS
DIASTOLIC BLOOD PRESSURE: 68 MMHG | SYSTOLIC BLOOD PRESSURE: 128 MMHG | WEIGHT: 176 LBS | HEIGHT: 74 IN | BODY MASS INDEX: 22.59 KG/M2 | RESPIRATION RATE: 18 BRPM | OXYGEN SATURATION: 97 % | HEART RATE: 87 BPM

## 2025-07-31 DIAGNOSIS — B02.29 NEURALGIA, POST-HERPETIC: Primary | ICD-10-CM

## 2025-07-31 DIAGNOSIS — I10 PRIMARY HYPERTENSION: ICD-10-CM

## 2025-07-31 DIAGNOSIS — E11.69 TYPE 2 DIABETES MELLITUS WITH OTHER SPECIFIED COMPLICATION, WITHOUT LONG-TERM CURRENT USE OF INSULIN: ICD-10-CM

## 2025-07-31 DIAGNOSIS — Z12.5 SCREENING FOR PROSTATE CANCER: ICD-10-CM

## 2025-07-31 DIAGNOSIS — R51.9 FACIAL PAIN, ACUTE: ICD-10-CM

## 2025-07-31 PROBLEM — B02.23 SHINGLES (HERPES ZOSTER) POLYNEUROPATHY: Status: ACTIVE | Noted: 2025-06-03

## 2025-07-31 RX ORDER — IBUPROFEN 800 MG/1
800 TABLET, FILM COATED ORAL 2 TIMES DAILY
Qty: 180 TABLET | Refills: 2 | Status: SHIPPED | OUTPATIENT
Start: 2025-07-31

## 2025-07-31 RX ORDER — OXYCODONE AND ACETAMINOPHEN 10; 325 MG/1; MG/1
1 TABLET ORAL EVERY 8 HOURS PRN
Qty: 90 TABLET | Refills: 0 | Status: SHIPPED | OUTPATIENT
Start: 2025-07-31

## 2025-07-31 RX ORDER — PREGABALIN 100 MG/1
100 CAPSULE ORAL 3 TIMES DAILY PRN
Qty: 90 CAPSULE | Refills: 2 | Status: SHIPPED | OUTPATIENT
Start: 2025-07-31

## 2025-07-31 NOTE — PROGRESS NOTES
"Chief Complaint  Chief Complaint   Patient presents with    Headache     6 week f/u     Neuralgia     6  week f/u        Subjective    History of Present Illness        Beth Mckinney presents to [facility] for   History of Present Illness  The patient presents for evaluation of pain, weight gain, and eye issues.    He is experiencing severe pain, which he believes is being managed by OxyContin. He has been on this medication for 2 years and is seeking a refill. He also takes ibuprofen and is requesting a refill of this prescription as well. He is currently taking Lyrica 75 mg twice daily but plans to reduce it to once daily. He was previously on a regimen of 75 mg three times daily but decided to gradually decrease the dosage. He reports no improvement in his condition over the past 2 months. He has undergone a CT scan of his head, which did not reveal any abnormalities. However, he has noticed lumps and bumps on his head that are causing him pain. He has previously tried gabapentin but discontinued it due to side effects.    He has gained approximately 10 pounds, which he attributes to his HIV medication. He has been on this medication for several years and maintains a healthy diet, although he admits to not exercising regularly. He is considering returning to the gym.    He is requesting a prescription for eye drops. He has previously used Tobradex ointment, which he found helpful in alleviating itchiness.    Diet: He maintains a healthy diet.  Headache    Associated symptoms: headaches      Symptoms are: chronic.   Onset was 1 to 5 years.   Symptoms occur: constantly.  Symptoms include: headaches.      History of Present Illness      Objective   Vital Signs:   Visit Vitals  /68   Pulse 87   Resp 18   Ht 188 cm (74.02\")   Wt 79.8 kg (176 lb)   SpO2 97%   BMI 22.58 kg/m²          BMI is within normal parameters. No other follow-up for BMI required.     Physical " Exam  Vitals reviewed.   Constitutional:       Appearance: He is well-developed.   HENT:      Head: Normocephalic.        Right Ear: External ear normal.      Left Ear: External ear normal.      Nose: Nose normal.   Eyes:      Conjunctiva/sclera: Conjunctivae normal.   Cardiovascular:      Rate and Rhythm: Normal rate and regular rhythm.   Pulmonary:      Effort: Pulmonary effort is normal.      Breath sounds: Normal breath sounds.   Musculoskeletal:         General: Normal range of motion.      Cervical back: Normal range of motion and neck supple.   Skin:     General: Skin is warm and dry.      Capillary Refill: Capillary refill takes less than 2 seconds.   Neurological:      Mental Status: He is alert and oriented to person, place, and time.        Physical Exam  Head: No visible lumps or bumps noted on palpation.  Respiratory: Clear to auscultation, no wheezing, rales or rhonchi         Result Review :  Results  Imaging   - CT scan of head: 07/2025, No abnormalities                          Assessment and Plan      Diagnoses and all orders for this visit:    1. Neuralgia, post-herpetic (Primary)  Assessment & Plan:  - Reports ongoing pain and has been gradually reducing his Lyrica dosage.  - Currently taking 75 mg twice a day and will increase to 100 mg twice a day, with the option to increase to three times a day if needed. If side effects occur, he will revert to the previous dosage.  - Prescription for OxyContin has been renewed.  - An x-ray of the facial bones has been ordered.    Orders:  -     oxyCODONE-acetaminophen (PERCOCET)  MG per tablet; Take 1 tablet by mouth Every 8 (Eight) Hours As Needed for Moderate Pain.  Dispense: 90 tablet; Refill: 0  -     pregabalin (Lyrica) 100 MG capsule; Take 1 capsule by mouth 3 (Three) Times a Day As Needed (neuropathic pain).  Dispense: 90 capsule; Refill: 2  -     ibuprofen (ADVIL,MOTRIN) 800 MG tablet; Take 1 tablet by mouth 2 (Two) Times a Day.  Dispense: 180  tablet; Refill: 2    2. Facial pain, acute  Assessment & Plan:  Due to his facial pain an x-ray of his head and face was ordered.    Orders:  -     XR Facial Bones 3+ View    3. Primary hypertension  -     CBC & Differential  -     Comprehensive Metabolic Panel  -     Lipid Panel With LDL / HDL Ratio  -     TSH    4. Type 2 diabetes mellitus with other specified complication, without long-term current use of insulin  -     Hemoglobin A1c  -     Microalbumin / Creatinine Urine Ratio - Urine, Clean Catch    5. Screening for prostate cancer  -     PSA Screen       Assessment & Plan             Follow Up   No follow-ups on file.  Patient was given instructions and counseling regarding his condition or for health maintenance advice. Please see specific information pulled into the AVS if appropriate.     Patient or patient representative verbalized consent for the use of Ambient Listening during the visit with  Nirav Donovan Sr, MD for chart documentation. 7/31/2025  12:02 EDT

## 2025-08-01 NOTE — ASSESSMENT & PLAN NOTE
- Reports ongoing pain and has been gradually reducing his Lyrica dosage.  - Currently taking 75 mg twice a day and will increase to 100 mg twice a day, with the option to increase to three times a day if needed. If side effects occur, he will revert to the previous dosage.  - Prescription for OxyContin has been renewed.  - An x-ray of the facial bones has been ordered.

## (undated) DEVICE — BLCK/BITE BLOX W/DENTL/RIM W/STRAP 54F

## (undated) DEVICE — MSK ENDO PORT O2 POM ELITE CURAPLEX A/

## (undated) DEVICE — CANN NASL O2 INF

## (undated) DEVICE — SENSR O2 OXIMAX FNGR A/ 18IN NONSTR

## (undated) DEVICE — ADAPT CLN BIOGUARD AIR/H2O DISP

## (undated) DEVICE — THE CARR-LOCKE INJECTION NEEDLE IS A SINGLE USE, DISPOSABLE, FLEXIBLE SHEATH INJECTION NEEDLE USED FOR THE INJECTION OF VARIOUS TYPES OF MEDIA THROUGH FLEXIBLE ENDOSCOPES.

## (undated) DEVICE — KT ORCA ORCAPOD DISP STRL

## (undated) DEVICE — TUBING, SUCTION, 1/4" X 10', STRAIGHT: Brand: MEDLINE

## (undated) DEVICE — TRY EPID CUST BHSCE

## (undated) DEVICE — GLV SURG TRIUMPH PF LTX 7.5 STRL

## (undated) DEVICE — LN SMPL CO2 SHTRM SD STREAM W/M LUER